# Patient Record
Sex: FEMALE | Race: WHITE | NOT HISPANIC OR LATINO | Employment: FULL TIME | URBAN - NONMETROPOLITAN AREA
[De-identification: names, ages, dates, MRNs, and addresses within clinical notes are randomized per-mention and may not be internally consistent; named-entity substitution may affect disease eponyms.]

---

## 2018-06-28 ENCOUNTER — HISTORICAL (OUTPATIENT)
Dept: ADMINISTRATIVE | Facility: HOSPITAL | Age: 23
End: 2018-06-28

## 2018-08-14 ENCOUNTER — HISTORICAL (OUTPATIENT)
Dept: ADMINISTRATIVE | Facility: HOSPITAL | Age: 23
End: 2018-08-14

## 2018-10-30 ENCOUNTER — HISTORICAL (OUTPATIENT)
Dept: ADMINISTRATIVE | Facility: HOSPITAL | Age: 23
End: 2018-10-30

## 2020-01-03 LAB
INFLUENZA A ANTIGEN, POC: NEGATIVE
INFLUENZA B ANTIGEN, POC: NEGATIVE

## 2020-02-24 ENCOUNTER — HISTORICAL (OUTPATIENT)
Dept: ADMINISTRATIVE | Facility: HOSPITAL | Age: 25
End: 2020-02-24

## 2020-04-07 ENCOUNTER — HISTORICAL (OUTPATIENT)
Dept: ADMINISTRATIVE | Facility: HOSPITAL | Age: 25
End: 2020-04-07

## 2022-02-15 ENCOUNTER — HISTORICAL (OUTPATIENT)
Dept: ADMINISTRATIVE | Facility: HOSPITAL | Age: 27
End: 2022-02-15

## 2022-02-15 LAB — PAP RECOMMENDATION EXT: NORMAL

## 2022-03-23 LAB
AGE: 24
ALBUMIN SERPL-MCNC: 4.6 G/DL (ref 3.4–5)
ALBUMIN/GLOB SERPL: 2 {RATIO}
ALP SERPL-CCNC: 62 U/L (ref 50–144)
ALT SERPL-CCNC: 15 U/L (ref 1–45)
ANION GAP SERPL CALC-SCNC: 8 MMOL/L (ref 2–13)
AST SERPL-CCNC: 31 U/L (ref 14–36)
BASOPHILS # BLD AUTO: 0.08 10*3/UL (ref 0.01–0.08)
BASOPHILS NFR BLD AUTO: 0.8 % (ref 0.1–1.2)
BILIRUB SERPL-MCNC: 0.49 MG/DL (ref 0–1)
BUN SERPL-MCNC: 11 MG/DL (ref 7–20)
CALCIUM SERPL-MCNC: 9.7 MG/DL (ref 8.4–10.2)
CHLORIDE SERPL-SCNC: 107 MMOL/L (ref 94–110)
CO2 SERPL-SCNC: 24 MMOL/L (ref 21–32)
CREAT SERPL-MCNC: 0.6 MG/DL (ref 0.52–1.04)
CREAT/UREA NIT SERPL: 18.3 (ref 12–20)
EOSINOPHIL # BLD AUTO: 0.37 10*3/UL (ref 0.04–0.36)
EOSINOPHIL NFR BLD AUTO: 3.6 % (ref 0.7–7)
ERYTHROCYTE [DISTWIDTH] IN BLOOD BY AUTOMATED COUNT: 13.6 % (ref 11–14.5)
GLOBULIN SER-MCNC: 2.3 G/DL (ref 2–3.9)
GLUCOSE SERPL-MCNC: 84 MG/DL (ref 70–115)
HCT VFR BLD AUTO: 40.5 % (ref 36–48)
HGB BLD-MCNC: 13.1 G/DL (ref 11.8–16)
IMM GRANULOCYTES # BLD AUTO: 0.04 10*3/UL (ref 0–0.03)
IMM GRANULOCYTES NFR BLD AUTO: 0.4 % (ref 0–0.5)
LYMPHOCYTES # BLD AUTO: 2.12 10*3/UL (ref 1.16–3.74)
LYMPHOCYTES NFR BLD AUTO: 20.5 % (ref 20–55)
MANUAL DIFF? (OHS): NORMAL
MCH RBC QN AUTO: 29.2 PG (ref 27–34)
MCHC RBC AUTO-ENTMCNC: 32.3 G/DL (ref 31–37)
MCV RBC AUTO: 90.4 FL (ref 79–99)
MONOCYTES # BLD AUTO: 0.81 10*3/UL (ref 0.24–0.36)
MONOCYTES NFR BLD AUTO: 7.8 % (ref 4.7–12.5)
NEUTROPHILS # BLD AUTO: 6.9 10*3/UL (ref 1.56–6.13)
NEUTROPHILS NFR BLD AUTO: 66.9 % (ref 37–73)
PLATELET # BLD AUTO: 349 10*3/UL (ref 140–371)
PMV BLD AUTO: 9.9 FL (ref 9.4–12.4)
POTASSIUM SERPL-SCNC: 4.3 MMOL/L (ref 3.5–5.1)
PROT SERPL-MCNC: 6.9 G/DL (ref 6.3–8.2)
RBC # BLD AUTO: 4.48 10*6/UL (ref 4–5.1)
SODIUM SERPL-SCNC: 139 MMOL/L (ref 135–145)
TSH SERPL-ACNC: 1.01 M[IU]/L (ref 0.36–3.74)
WBC # SPEC AUTO: 10.3 10*3/UL (ref 4–11.5)

## 2022-04-11 ENCOUNTER — HISTORICAL (OUTPATIENT)
Dept: ADMINISTRATIVE | Facility: HOSPITAL | Age: 27
End: 2022-04-11

## 2022-04-25 VITALS
BODY MASS INDEX: 19.99 KG/M2 | WEIGHT: 117.06 LBS | HEIGHT: 64 IN | DIASTOLIC BLOOD PRESSURE: 60 MMHG | OXYGEN SATURATION: 99 % | SYSTOLIC BLOOD PRESSURE: 120 MMHG

## 2022-04-26 ENCOUNTER — HISTORICAL (OUTPATIENT)
Dept: ADMINISTRATIVE | Facility: HOSPITAL | Age: 27
End: 2022-04-26

## 2022-05-15 ENCOUNTER — HISTORICAL (OUTPATIENT)
Dept: ADMINISTRATIVE | Facility: HOSPITAL | Age: 27
End: 2022-05-15

## 2022-07-08 ENCOUNTER — HOSPITAL ENCOUNTER (EMERGENCY)
Facility: HOSPITAL | Age: 27
Discharge: HOME | End: 2022-07-08
Payer: MEDICAID

## 2022-07-08 VITALS
SYSTOLIC BLOOD PRESSURE: 110 MMHG | TEMPERATURE: 99 F | RESPIRATION RATE: 16 BRPM | WEIGHT: 121.63 LBS | HEART RATE: 96 BPM | HEIGHT: 64 IN | OXYGEN SATURATION: 99 % | DIASTOLIC BLOOD PRESSURE: 72 MMHG | BODY MASS INDEX: 20.76 KG/M2

## 2022-07-08 DIAGNOSIS — R52 BODY ACHES: ICD-10-CM

## 2022-07-08 DIAGNOSIS — U07.1 COVID-19: ICD-10-CM

## 2022-07-08 DIAGNOSIS — R05.9 COUGH: Primary | ICD-10-CM

## 2022-07-08 LAB
FLUAV AG UPPER RESP QL IA.RAPID: NOT DETECTED
FLUBV AG UPPER RESP QL IA.RAPID: NOT DETECTED
RSV A 5' UTR RNA NPH QL NAA+PROBE: NOT DETECTED
SARS-COV-2 RNA RESP QL NAA+PROBE: DETECTED

## 2022-07-08 PROCEDURE — 99282 EMERGENCY DEPT VISIT SF MDM: CPT

## 2022-07-08 PROCEDURE — 87636 SARSCOV2 & INF A&B AMP PRB: CPT | Performed by: NURSE PRACTITIONER

## 2022-07-08 NOTE — ED PROVIDER NOTES
Encounter Date: 7/8/2022       History     Chief Complaint   Patient presents with    Generalized Body Aches     Pt c/o generalized body aches since yesterday, vomited x 1 today     26 year old female presents to ED for nasal congestion, headache, and body aches since last night. Denies being around anyone who is ill. Denies known contact with anyone who has COVID-19. Denies any cough, SOB, fever, or CP. Denies getting COVID-19 vaccine. Denies any N/V/D. Denies taking any medications daily. Denies any abdominal pain.        Review of patient's allergies indicates:   Allergen Reactions    Iodinated contrast media Shortness Of Breath    Shrimp      History reviewed. No pertinent past medical history.  History reviewed. No pertinent surgical history.  History reviewed. No pertinent family history.  Social History     Tobacco Use    Smoking status: Current Every Day Smoker     Types: Cigarettes    Smokeless tobacco: Never Used   Substance Use Topics    Alcohol use: Yes     Alcohol/week: 2.0 standard drinks     Types: 2 Standard drinks or equivalent per week    Drug use: Yes     Frequency: 1.0 times per week     Types: Marijuana     Review of Systems   Constitutional: Negative.  Negative for activity change, appetite change, chills, fatigue and fever.   HENT: Positive for congestion and rhinorrhea. Negative for dental problem, drooling, ear discharge, ear pain, hearing loss, mouth sores, sneezing and sore throat.    Eyes: Negative.    Respiratory: Negative.    Cardiovascular: Negative.    Gastrointestinal: Negative.    Endocrine: Negative.    Genitourinary: Negative.    Musculoskeletal: Positive for back pain and myalgias. Negative for gait problem and joint swelling.   Skin: Negative.    Allergic/Immunologic: Negative.    Neurological: Positive for headaches. Negative for dizziness, seizures and syncope.   Psychiatric/Behavioral: Negative.        Physical Exam     Initial Vitals [07/08/22 1259]   BP Pulse Resp  Temp SpO2   103/60 106 20 99 °F (37.2 °C) 99 %      MAP       --         Physical Exam    Constitutional: She appears well-developed and well-nourished. She is not diaphoretic. No distress.   Eyes: EOM are normal. Pupils are equal, round, and reactive to light.   Neck: Neck supple.   Normal range of motion.  Pulmonary/Chest: Breath sounds normal. No respiratory distress. She has no wheezes. She has no rhonchi. She has no rales. She exhibits no tenderness.   Musculoskeletal:         General: Normal range of motion.      Cervical back: Normal range of motion and neck supple.     Neurological: She is alert and oriented to person, place, and time.   Skin: Skin is warm and dry.   Psychiatric: She has a normal mood and affect. Her behavior is normal.         ED Course   Procedures  Labs Reviewed   COVID/RSV/FLU A&B PCR - Abnormal; Notable for the following components:       Result Value    SARS-CoV-2 PCR Detected (*)     All other components within normal limits          Imaging Results    None          Medications - No data to display              ED Course as of 07/08/22 1432   Fri Jul 08, 2022   1430 Discussed with patient she has COVID-19. Patient denies any past medical history and is other wise a healthy 26 year old female. Discussed with patient to take tylenol or ibuprofen as needed for pain/fever every 6 hours, follow instructions on OTC bottle. Discussed with patient 5 day quarantine as long as she is fever free for 48 hours and to wear a mask for an additional 5 days after quarantine. Discussed with patient to return to ED if symptoms persist or worsen or if she develops SOB or CP. Follow up with PCP in 1-2 days  [KM]      ED Course User Index  [KM] ROLA Griffith             Clinical Impression:   Final diagnoses:  [R05.9] Cough (Primary)  [R52] Body aches  [U07.1] COVID-19          ED Disposition Condition    Discharge Stable        ED Prescriptions     None        Follow-up Information    None           Jania Hurst, [facility]  07/08/22 4557

## 2022-07-08 NOTE — DISCHARGE INSTRUCTIONS
Follow up with primary care provider in as needed  Return to ED if symptoms persist or worsen or if you develop shortness of breath or chest pain

## 2022-09-21 ENCOUNTER — HISTORICAL (OUTPATIENT)
Dept: ADMINISTRATIVE | Facility: HOSPITAL | Age: 27
End: 2022-09-21
Payer: MEDICAID

## 2022-10-11 LAB
CHLAMYDIA: NEGATIVE
GONOCOCCUS BY NAA: NEGATIVE
TRICHOMONAS: NEGATIVE

## 2022-11-11 LAB
ABO + RH BLD: NORMAL
HBV SURFACE AG SERPL QL IA: NEGATIVE
HCT VFR BLD AUTO: 38.6 % (ref 36–46)
HCV AB SERPL QL IA: NEGATIVE
HGB BLD-MCNC: 12.7 G/DL (ref 12–16)
HGB ELECTROPHORESIS: NORMAL
HIV 1+2 AB+HIV1 P24 AG SERPL QL IA: NONREACTIVE
INDIRECT COOMBS: NEGATIVE
MCV RBC AUTO: 86.9 FL (ref 82–108)
PLATELET # BLD AUTO: 339 K/UL (ref 150–399)
RUBELLA IMMUNE STATUS: NORMAL
T PALLIDUM AB SER QL: NONREACTIVE
URINE CULTURE, ROUTINE: POSITIVE
VARICELLA ANTIBODY, IGG, CSF: NORMAL

## 2023-01-03 ENCOUNTER — HISTORICAL (OUTPATIENT)
Dept: ADMINISTRATIVE | Facility: HOSPITAL | Age: 28
End: 2023-01-03
Payer: MEDICAID

## 2023-01-23 ENCOUNTER — DOCUMENTATION ONLY (OUTPATIENT)
Dept: ADMINISTRATIVE | Facility: HOSPITAL | Age: 28
End: 2023-01-23
Payer: MEDICAID

## 2023-01-23 ENCOUNTER — HOSPITAL ENCOUNTER (EMERGENCY)
Facility: HOSPITAL | Age: 28
Discharge: HOME | End: 2023-01-23
Attending: FAMILY MEDICINE
Payer: MEDICAID

## 2023-01-23 VITALS
OXYGEN SATURATION: 96 % | BODY MASS INDEX: 22.99 KG/M2 | SYSTOLIC BLOOD PRESSURE: 113 MMHG | RESPIRATION RATE: 18 BRPM | HEART RATE: 87 BPM | DIASTOLIC BLOOD PRESSURE: 57 MMHG | HEIGHT: 64 IN | WEIGHT: 134.63 LBS | TEMPERATURE: 98 F

## 2023-01-23 DIAGNOSIS — J18.9 PNEUMONIA OF RIGHT MIDDLE LOBE DUE TO INFECTIOUS ORGANISM: Primary | ICD-10-CM

## 2023-01-23 DIAGNOSIS — R05.9 COUGH: ICD-10-CM

## 2023-01-23 LAB
ALBUMIN SERPL-MCNC: 3.2 G/DL (ref 3.4–5)
ALBUMIN/GLOB SERPL: 1.2 RATIO
ALP SERPL-CCNC: 87 UNIT/L (ref 50–144)
ALT SERPL-CCNC: 16 UNIT/L (ref 1–45)
AST SERPL-CCNC: 28 UNIT/L (ref 14–36)
BASOPHILS # BLD AUTO: 0.03 X10(3)/MCL (ref 0.01–0.08)
BASOPHILS NFR BLD AUTO: 0.4 % (ref 0.1–1.2)
BILIRUBIN DIRECT+TOT PNL SERPL-MCNC: 0.2 MG/DL (ref 0–1)
BUN SERPL-MCNC: 5 MG/DL (ref 7–20)
CALCIUM SERPL-MCNC: 8.4 MG/DL (ref 8.4–10.2)
CHLORIDE SERPL-SCNC: 109 MMOL/L (ref 98–110)
CO2 SERPL-SCNC: 24 MMOL/L (ref 21–32)
CREAT SERPL-MCNC: 0.48 MG/DL (ref 0.66–1.25)
EOSINOPHIL # BLD AUTO: 0.13 X10(3)/MCL (ref 0.04–0.36)
EOSINOPHIL NFR BLD AUTO: 1.7 % (ref 0.7–7)
ERYTHROCYTE [DISTWIDTH] IN BLOOD BY AUTOMATED COUNT: 14.2 % (ref 11–14.5)
GFR SERPLBLD CREATININE-BSD FMLA CKD-EPI: >90 MLS/MIN/1.73/M2
GLOBULIN SER-MCNC: 2.6 GM/DL (ref 2–3.9)
GLUCOSE SERPL-MCNC: 86 MG/DL (ref 70–115)
HCT VFR BLD AUTO: 32.8 % (ref 36–48)
HGB BLD-MCNC: 11 GM/DL (ref 11.8–16)
IMM GRANULOCYTES # BLD AUTO: 0.08 X10(3)/MCL (ref 0–0.03)
IMM GRANULOCYTES NFR BLD AUTO: 1 % (ref 0–0.5)
INFLUENZA A (OHS): NEGATIVE
INFLUENZA B (OHS): NEGATIVE
LYMPHOCYTES # BLD AUTO: 1.21 X10(3)/MCL (ref 1.16–3.74)
LYMPHOCYTES NFR BLD AUTO: 15.7 % (ref 20–55)
MCH RBC QN AUTO: 29.8 PG (ref 27–34)
MCV RBC AUTO: 88.9 FL (ref 79–99)
MEAN CELL HEMOGLOBIN CONCENTRATION (OHS) G/DL: 33.5 G/DL (ref 31–37)
MONOCYTES # BLD AUTO: 0.7 X10(3)/MCL (ref 0.24–0.36)
MONOCYTES NFR BLD AUTO: 9.1 % (ref 4.7–12.5)
NEUTROPHILS # BLD AUTO: 5.58 X10(3)/MCL (ref 1.56–6.13)
NEUTROPHILS NFR BLD AUTO: 72.1 % (ref 37–73)
NRBC BLD AUTO-RTO: 0 % (ref 0–1)
PLATELET # BLD AUTO: 266 X10(3)/MCL (ref 140–371)
PMV BLD AUTO: 9.5 FL (ref 9.4–12.4)
POTASSIUM SERPL-SCNC: 3.5 MMOL/L (ref 3.5–5.1)
PROT SERPL-MCNC: 5.8 GM/DL (ref 6.3–8.2)
RBC # BLD AUTO: 3.69 X10(6)/MCL (ref 4–5.1)
SARS-COV-2 RDRP RESP QL NAA+PROBE: NEGATIVE
SODIUM SERPL-SCNC: 135 MMOL/L (ref 135–145)
WBC # SPEC AUTO: 7.7 X10(3)/MCL (ref 4–11.5)

## 2023-01-23 PROCEDURE — 99284 EMERGENCY DEPT VISIT MOD MDM: CPT | Mod: 25

## 2023-01-23 PROCEDURE — 25000003 PHARM REV CODE 250: Performed by: FAMILY MEDICINE

## 2023-01-23 PROCEDURE — 96374 THER/PROPH/DIAG INJ IV PUSH: CPT

## 2023-01-23 PROCEDURE — 36415 COLL VENOUS BLD VENIPUNCTURE: CPT | Performed by: FAMILY MEDICINE

## 2023-01-23 PROCEDURE — 63600175 PHARM REV CODE 636 W HCPCS: Performed by: FAMILY MEDICINE

## 2023-01-23 PROCEDURE — 87635 SARS-COV-2 COVID-19 AMP PRB: CPT | Performed by: FAMILY MEDICINE

## 2023-01-23 PROCEDURE — 80053 COMPREHEN METABOLIC PANEL: CPT | Performed by: FAMILY MEDICINE

## 2023-01-23 PROCEDURE — 96361 HYDRATE IV INFUSION ADD-ON: CPT

## 2023-01-23 PROCEDURE — 87400 INFLUENZA A/B EACH AG IA: CPT | Performed by: FAMILY MEDICINE

## 2023-01-23 PROCEDURE — 96372 THER/PROPH/DIAG INJ SC/IM: CPT | Mod: 59 | Performed by: FAMILY MEDICINE

## 2023-01-23 PROCEDURE — 85025 COMPLETE CBC W/AUTO DIFF WBC: CPT | Performed by: FAMILY MEDICINE

## 2023-01-23 RX ORDER — SODIUM CHLORIDE 9 MG/ML
1000 INJECTION, SOLUTION INTRAVENOUS ONCE
Status: COMPLETED | OUTPATIENT
Start: 2023-01-23 | End: 2023-01-23

## 2023-01-23 RX ORDER — METHYLPREDNISOLONE SOD SUCC 125 MG
125 VIAL (EA) INJECTION
Status: COMPLETED | OUTPATIENT
Start: 2023-01-23 | End: 2023-01-23

## 2023-01-23 RX ORDER — CEFTRIAXONE 1 G/1
1 INJECTION, POWDER, FOR SOLUTION INTRAMUSCULAR; INTRAVENOUS
Status: COMPLETED | OUTPATIENT
Start: 2023-01-23 | End: 2023-01-23

## 2023-01-23 RX ORDER — AZITHROMYCIN 250 MG/1
TABLET, FILM COATED ORAL
Qty: 6 TABLET | Refills: 0 | Status: ON HOLD | OUTPATIENT
Start: 2023-01-23 | End: 2023-05-15 | Stop reason: HOSPADM

## 2023-01-23 RX ADMIN — SODIUM CHLORIDE 1000 ML: 9 INJECTION, SOLUTION INTRAVENOUS at 10:01

## 2023-01-23 RX ADMIN — CEFTRIAXONE SODIUM 1 G: 1 INJECTION, POWDER, FOR SOLUTION INTRAMUSCULAR; INTRAVENOUS at 01:01

## 2023-01-23 RX ADMIN — METHYLPREDNISOLONE SODIUM SUCCINATE 125 MG: 125 INJECTION, POWDER, FOR SOLUTION INTRAMUSCULAR; INTRAVENOUS at 10:01

## 2023-01-23 NOTE — ED PROVIDER NOTES
Encounter Date: 1/23/2023       History     Chief Complaint   Patient presents with    Cough    Shortness of Breath    Weakness     Patient reports cough since yesterday with SOB when laying flat and walking. Reports she has bodyaches and feels weak. Patient's coworker tested positive for COVID. Patient is 5 months pregnant.     Pt Is a 27-year-old female presents to emergency room with complaints of cough nose chills and body aches since yesterday    The history is provided by the patient.   Cough  This is a new problem. The current episode started yesterday. The problem occurs constantly. The problem has been unchanged. The cough is Non-productive. Associated symptoms include chills, headaches, myalgias and shortness of breath. She has tried nothing for the symptoms.   Shortness of Breath  This is a new problem. The current episode started yesterday. Associated symptoms include headaches and cough. She has had No prior hospitalizations.   Review of patient's allergies indicates:   Allergen Reactions    Iodinated contrast media Shortness Of Breath    Iodine     Shrimp      No past medical history on file.  Past Surgical History:   Procedure Laterality Date    CHOLECYSTECTOMY       No family history on file.  Social History     Tobacco Use    Smoking status: Every Day     Types: Cigarettes    Smokeless tobacco: Never   Substance Use Topics    Alcohol use: Yes     Alcohol/week: 2.0 standard drinks     Types: 2 Standard drinks or equivalent per week    Drug use: Yes     Frequency: 1.0 times per week     Types: Marijuana     Review of Systems   Constitutional:  Positive for chills.   Respiratory:  Positive for cough and shortness of breath.    Musculoskeletal:  Positive for myalgias.   Neurological:  Positive for headaches.   All other systems reviewed and are negative.    Physical Exam     Initial Vitals [01/23/23 0915]   BP Pulse Resp Temp SpO2   119/68 91 18 97.6 °F (36.4 °C) 100 %      MAP       --         Physical  Exam    Vitals reviewed.  Constitutional: She appears well-developed and well-nourished.   HENT:   Head: Normocephalic and atraumatic.   Eyes: EOM are normal. Pupils are equal, round, and reactive to light.   Neck: Neck supple.   Normal range of motion.  Cardiovascular:  Normal rate, regular rhythm, normal heart sounds and intact distal pulses.           Pulmonary/Chest: Breath sounds normal.   Abdominal: Abdomen is soft. Bowel sounds are normal.   Musculoskeletal:         General: Normal range of motion.      Cervical back: Normal range of motion and neck supple.     Neurological: She is alert and oriented to person, place, and time. GCS score is 15. GCS eye subscore is 4. GCS verbal subscore is 5. GCS motor subscore is 6.   Skin: Skin is warm and dry.   Psychiatric: She has a normal mood and affect. Thought content normal.       ED Course   Procedures  Labs Reviewed   RAPID INFLUENZA A/B   SARS-COV-2 RNA AMPLIFICATION, QUAL   CBC W/ AUTO DIFFERENTIAL    Narrative:     The following orders were created for panel order CBC auto differential.  Procedure                               Abnormality         Status                     ---------                               -----------         ------                     CBC with Differential[[id number]]                                                         Please view results for these tests on the individual orders.   COMPREHENSIVE METABOLIC PANEL   CBC WITH DIFFERENTIAL          Imaging Results    None       X-Rays:   Independently Interpreted Readings:   Chest X-Ray: Rml infiltrate   Medications   0.9%  NaCl infusion (has no administration in time range)   methylPREDNISolone sodium succinate injection 125 mg (has no administration in time range)                              Clinical Impression:   Final diagnoses:  [R05.9] Cough               Akbar Renteria MD  01/23/23 9847

## 2023-01-24 PROBLEM — F32.A DEPRESSIVE DISORDER: Status: ACTIVE | Noted: 2023-01-24

## 2023-01-24 PROBLEM — J45.909 ASTHMA: Status: ACTIVE | Noted: 2023-01-24

## 2023-01-24 PROBLEM — Z86.19 HISTORY OF HELICOBACTER PYLORI INFECTION: Status: ACTIVE | Noted: 2023-01-24

## 2023-01-24 PROBLEM — K21.9 ACID REFLUX: Status: ACTIVE | Noted: 2023-01-24

## 2023-01-24 PROBLEM — F41.9 ANXIETY: Status: ACTIVE | Noted: 2023-01-24

## 2023-01-24 PROBLEM — Z34.90 PREGNANCY: Status: ACTIVE | Noted: 2022-08-06

## 2023-01-24 PROBLEM — F12.90 MARIJUANA USER: Status: ACTIVE | Noted: 2023-01-24

## 2023-01-24 PROBLEM — O99.322 MATERNAL DRUG USE COMPLICATING PREGNANCY IN SECOND TRIMESTER, ANTEPARTUM: Status: ACTIVE | Noted: 2023-01-24

## 2023-01-24 PROBLEM — E55.9 VITAMIN D DEFICIENCY: Status: ACTIVE | Noted: 2023-01-24

## 2023-01-26 ENCOUNTER — OFFICE VISIT (OUTPATIENT)
Dept: FAMILY MEDICINE | Facility: CLINIC | Age: 28
End: 2023-01-26
Payer: MEDICAID

## 2023-01-26 VITALS
DIASTOLIC BLOOD PRESSURE: 82 MMHG | HEIGHT: 64 IN | OXYGEN SATURATION: 98 % | WEIGHT: 139.31 LBS | TEMPERATURE: 97 F | BODY MASS INDEX: 23.78 KG/M2 | SYSTOLIC BLOOD PRESSURE: 110 MMHG | HEART RATE: 92 BPM

## 2023-01-26 DIAGNOSIS — R05.1 ACUTE COUGH: ICD-10-CM

## 2023-01-26 DIAGNOSIS — Z00.00 ADULT GENERAL MEDICAL EXAMINATION: ICD-10-CM

## 2023-01-26 DIAGNOSIS — J18.9 PNEUMONIA AFFECTING PREGNANCY IN SECOND TRIMESTER: Primary | ICD-10-CM

## 2023-01-26 DIAGNOSIS — E55.9 VITAMIN D DEFICIENCY: ICD-10-CM

## 2023-01-26 DIAGNOSIS — O99.512 PNEUMONIA AFFECTING PREGNANCY IN SECOND TRIMESTER: Primary | ICD-10-CM

## 2023-01-26 DIAGNOSIS — J45.901 ASTHMA WITH ACUTE EXACERBATION, UNSPECIFIED ASTHMA SEVERITY, UNSPECIFIED WHETHER PERSISTENT: ICD-10-CM

## 2023-01-26 PROCEDURE — 1160F PR REVIEW ALL MEDS BY PRESCRIBER/CLIN PHARMACIST DOCUMENTED: ICD-10-PCS | Mod: CPTII,,, | Performed by: NURSE PRACTITIONER

## 2023-01-26 PROCEDURE — 1159F PR MEDICATION LIST DOCUMENTED IN MEDICAL RECORD: ICD-10-PCS | Mod: CPTII,,, | Performed by: NURSE PRACTITIONER

## 2023-01-26 PROCEDURE — 1159F MED LIST DOCD IN RCRD: CPT | Mod: CPTII,,, | Performed by: NURSE PRACTITIONER

## 2023-01-26 PROCEDURE — 3074F PR MOST RECENT SYSTOLIC BLOOD PRESSURE < 130 MM HG: ICD-10-PCS | Mod: CPTII,,, | Performed by: NURSE PRACTITIONER

## 2023-01-26 PROCEDURE — 99213 PR OFFICE/OUTPT VISIT, EST, LEVL III, 20-29 MIN: ICD-10-PCS | Mod: ,,, | Performed by: NURSE PRACTITIONER

## 2023-01-26 PROCEDURE — 1160F RVW MEDS BY RX/DR IN RCRD: CPT | Mod: CPTII,,, | Performed by: NURSE PRACTITIONER

## 2023-01-26 PROCEDURE — 3008F BODY MASS INDEX DOCD: CPT | Mod: CPTII,,, | Performed by: NURSE PRACTITIONER

## 2023-01-26 PROCEDURE — 3079F DIAST BP 80-89 MM HG: CPT | Mod: CPTII,,, | Performed by: NURSE PRACTITIONER

## 2023-01-26 PROCEDURE — 99213 OFFICE O/P EST LOW 20 MIN: CPT | Mod: ,,, | Performed by: NURSE PRACTITIONER

## 2023-01-26 PROCEDURE — 3074F SYST BP LT 130 MM HG: CPT | Mod: CPTII,,, | Performed by: NURSE PRACTITIONER

## 2023-01-26 PROCEDURE — 3008F PR BODY MASS INDEX (BMI) DOCUMENTED: ICD-10-PCS | Mod: CPTII,,, | Performed by: NURSE PRACTITIONER

## 2023-01-26 PROCEDURE — 3079F PR MOST RECENT DIASTOLIC BLOOD PRESSURE 80-89 MM HG: ICD-10-PCS | Mod: CPTII,,, | Performed by: NURSE PRACTITIONER

## 2023-01-26 RX ORDER — ALBUTEROL SULFATE 0.83 MG/ML
2.5 SOLUTION RESPIRATORY (INHALATION) EVERY 6 HOURS PRN
Qty: 90 ML | Refills: 0 | Status: SHIPPED | OUTPATIENT
Start: 2023-01-26 | End: 2023-06-08

## 2023-01-26 RX ORDER — PREDNISONE 20 MG/1
20 TABLET ORAL DAILY
Qty: 5 TABLET | Refills: 0 | Status: ON HOLD | OUTPATIENT
Start: 2023-01-26 | End: 2023-05-15 | Stop reason: HOSPADM

## 2023-01-26 RX ORDER — ALBUTEROL SULFATE 90 UG/1
2 AEROSOL, METERED RESPIRATORY (INHALATION) EVERY 6 HOURS PRN
Qty: 18 G | Refills: 11 | Status: SHIPPED | OUTPATIENT
Start: 2023-01-26 | End: 2023-06-08

## 2023-01-26 NOTE — PROGRESS NOTES
Patient ID: Jamia Smith is a 27 y.o. female.    Chief Complaint: Cough and Pneumonia (ER follow. Still not feeling better )                     Problem List:  Patient Active Problem List   Diagnosis    Acid reflux    History of Helicobacter pylori infection    Pregnancy    Anxiety    Asthma    Depressive disorder    Vitamin D deficiency    Maternal drug use complicating pregnancy in second trimester, antepartum    Marijuana user        Current Medications:  Current Outpatient Medications   Medication Instructions    albuterol (PROVENTIL) 2.5 mg, Nebulization, Every 6 hours PRN, Rescue    albuterol (VENTOLIN HFA) 90 mcg/actuation inhaler 2 puffs, Inhalation, Every 6 hours PRN, Rescue    azithromycin (Z-FLORESITA) 250 MG tablet Take 2 tablets by mouth on day 1; Take 1 tablet by mouth on days 2-5<BR>    nebulizer accessories Kit Misc.(Non-Drug; Combo Route)    predniSONE (DELTASONE) 20 mg, Oral, Daily       History of Present Illness:  The patient is 27 y.o. White female for evaluation and management with a chief complaint of Cough and Pneumonia (ER follow. Still not feeling better )   Patient went to ER for worsening cough over 3 days, diagnosed with pnm. Treated with IV fluids, rocephin and steroid. Currently taking azithromycin. Patient is out of her rescue inhaler.     Cough  This is a new problem. The problem occurs every few minutes. The cough is Non-productive. Associated symptoms include nasal congestion, postnasal drip, rhinorrhea, shortness of breath and wheezing. Pertinent negatives include no chest pain, chills, ear congestion, ear pain, fever, headaches, heartburn, myalgias, rash, sore throat, sweats or weight loss. She has tried a beta-agonist inhaler for the symptoms. The treatment provided mild relief. Her past medical history is significant for asthma and pneumonia.   Pneumonia  She complains of cough, shortness of breath and wheezing. Associated symptoms include appetite change, nasal congestion,  "postnasal drip and rhinorrhea. Pertinent negatives include no chest pain, ear congestion, ear pain, fever, headaches, heartburn, myalgias, sore throat, sweats or weight loss. Her past medical history is significant for asthma and pneumonia.      Review of Systems   Constitutional:  Positive for appetite change and fatigue. Negative for chills, fever and weight loss.   HENT:  Positive for postnasal drip and rhinorrhea. Negative for ear pain and sore throat.    Respiratory:  Positive for cough, shortness of breath and wheezing.    Cardiovascular:  Negative for chest pain.   Gastrointestinal:  Negative for abdominal pain, heartburn and nausea.   Musculoskeletal:  Negative for myalgias.   Integumentary:  Negative for rash.   Neurological:  Negative for dizziness and headaches.      Visit Vitals  /82 (BP Location: Right arm)   Pulse 92   Temp 96.8 °F (36 °C) (Temporal)   Ht 5' 4.02" (1.626 m)   Wt 63.2 kg (139 lb 5.3 oz)   LMP  (LMP Unknown)   SpO2 98%   BMI 23.90 kg/m²       Physical Exam  Vitals reviewed.   Constitutional:       General: She is not in acute distress.     Appearance: Normal appearance.   HENT:      Head: Normocephalic and atraumatic.      Right Ear: Tympanic membrane, ear canal and external ear normal.      Left Ear: Tympanic membrane, ear canal and external ear normal.      Nose: Congestion and rhinorrhea present.      Mouth/Throat:      Mouth: Mucous membranes are moist.      Pharynx: Oropharynx is clear. No oropharyngeal exudate or posterior oropharyngeal erythema.   Eyes:      Conjunctiva/sclera: Conjunctivae normal.   Cardiovascular:      Rate and Rhythm: Normal rate and regular rhythm.      Pulses: Normal pulses.      Heart sounds: No murmur heard.  Pulmonary:      Effort: Pulmonary effort is normal.      Breath sounds: Wheezing and rhonchi present.   Musculoskeletal:         General: No swelling, tenderness or deformity. Normal range of motion.      Cervical back: Normal range of motion and " neck supple.   Lymphadenopathy:      Cervical: No cervical adenopathy.   Skin:     General: Skin is warm and dry.      Capillary Refill: Capillary refill takes less than 2 seconds.      Coloration: Skin is not jaundiced.      Findings: No rash.   Neurological:      Mental Status: She is alert and oriented to person, place, and time.      Cranial Nerves: No cranial nerve deficit.   Psychiatric:         Mood and Affect: Mood normal.         Behavior: Behavior normal.        Assessment:    ICD-10-CM ICD-9-CM   1. Pneumonia affecting pregnancy in second trimester  O99.512 648.93    J18.9 486   2. Acute cough  R05.1 786.2   3. Asthma with acute exacerbation, unspecified asthma severity, unspecified whether persistent  J45.901 493.92        Plan:    1. Pneumonia affecting pregnancy in second trimester  Comments:  complete abx as prescribed. educated on proper use of incentive spirometer. refill rescue inhaler. ordered nebulizer. discussed ER precautions.     2. Acute cough    3. Asthma with acute exacerbation, unspecified asthma severity, unspecified whether persistent  Comments:  encouraged rest, hydration and nutrition. work excuse provided.     Other orders  -     albuterol (PROVENTIL) 2.5 mg /3 mL (0.083 %) nebulizer solution; Take 3 mLs (2.5 mg total) by nebulization every 6 (six) hours as needed for Wheezing or Shortness of Breath. Rescue  Dispense: 90 mL; Refill: 0  -     albuterol (VENTOLIN HFA) 90 mcg/actuation inhaler; Inhale 2 puffs into the lungs every 6 (six) hours as needed for Wheezing or Shortness of Breath. Rescue  Dispense: 18 g; Refill: 11  -     predniSONE (DELTASONE) 20 MG tablet; Take 1 tablet (20 mg total) by mouth once daily.  Dispense: 5 tablet; Refill: 0         Future Appointments   Date Time Provider Department Center   1/31/2023  9:30 AM MARY [facility]   8/23/2023  8:20 AM LAB, [facility] LABORATORY DRAW STATION [facility] DOROTHY Guajardo   8/30/2023  2:00 PM DEREJE Oliver  CM [facility]       Follow up in about 8 months (around 9/26/2023) for Wellness with fasting labs prior. or sooner as needed.  Future labs:    CBC, CMP, lipid, TSH, A1c    DEREJE SOARES

## 2023-01-27 VITALS — DIASTOLIC BLOOD PRESSURE: 68 MMHG | SYSTOLIC BLOOD PRESSURE: 120 MMHG | WEIGHT: 136 LBS | BODY MASS INDEX: 23.34 KG/M2

## 2023-01-31 ENCOUNTER — ROUTINE PRENATAL (OUTPATIENT)
Dept: OBSTETRICS AND GYNECOLOGY | Facility: CLINIC | Age: 28
End: 2023-01-31
Payer: MEDICAID

## 2023-01-31 VITALS
DIASTOLIC BLOOD PRESSURE: 68 MMHG | WEIGHT: 136.44 LBS | BODY MASS INDEX: 23.41 KG/M2 | SYSTOLIC BLOOD PRESSURE: 116 MMHG

## 2023-01-31 DIAGNOSIS — O99.332 MATERNAL TOBACCO USE IN SECOND TRIMESTER: ICD-10-CM

## 2023-01-31 DIAGNOSIS — O99.322 MATERNAL DRUG USE COMPLICATING PREGNANCY IN SECOND TRIMESTER, ANTEPARTUM: Primary | ICD-10-CM

## 2023-01-31 DIAGNOSIS — Z3A.24 24 WEEKS GESTATION OF PREGNANCY: ICD-10-CM

## 2023-01-31 LAB
BILIRUB SERPL-MCNC: NORMAL MG/DL
BLOOD URINE, POC: NORMAL
CLARITY, POC UA: NORMAL
COLOR, POC UA: YELLOW
GLUCOSE UR QL STRIP: NEGATIVE
KETONES UR QL STRIP: NORMAL
LEUKOCYTE ESTERASE URINE, POC: NEGATIVE
NITRITE, POC UA: NEGATIVE
PH, POC UA: NORMAL
PROTEIN, POC: NEGATIVE
SPECIFIC GRAVITY, POC UA: NORMAL
UROBILINOGEN, POC UA: NORMAL

## 2023-01-31 PROCEDURE — 99213 PR OFFICE/OUTPT VISIT, EST, LEVL III, 20-29 MIN: ICD-10-PCS | Mod: TH,,,

## 2023-01-31 PROCEDURE — 99213 OFFICE O/P EST LOW 20 MIN: CPT | Mod: TH,,,

## 2023-01-31 NOTE — PROGRESS NOTES
Subjective:       Patient ID: Jamia Smith is a 27 y.o. female.    Chief Complaint:  Routine Prenatal Visit      History of Present Illness  HPI  Jamia Smith 27 y.o. White female  presents to clinic at 24w4d for her routine tummy check. Denies any complaints today. Anatomy US (23) WNL    GYN & OB History  Patient's last menstrual period was 2022 (approximate).   Date of Last Pap: No result found    OB History    Para Term  AB Living   1             SAB IAB Ectopic Multiple Live Births                  # Outcome Date GA Lbr Meir/2nd Weight Sex Delivery Anes PTL Lv   1 Current                Review of Systems  Review of Systems   General/Constitutional: Fever denies .    Skin: Rash denies.   Respiratory: Cough denies . SOB denies . Wheezing denies .   Cardiovascular: Chest pain denies . Dizziness denies . Palpitations denies . Swelling in hands/feet denies    Gastrointestinal: Abdominal pain denies . Constipation denies . Diarrhea denies . Heartburn denies . Nausea denies . Vomiting denies    Genitourinary: Painful urination denies.   Gynecologic: Vaginal bleeding denies . Vaginal discharge Normal. Leaking amniotic fluid denies.  Contractions denies    Psychiatric: Depressed mood denies. Suicidal thoughts denies.       Objective:    Physical Exam     Gen: NAD   Abd: Gravid, NT   Ext: No CCE   FH: 24  FHT:  160  Assessment:        1. Maternal drug use complicating pregnancy in second trimester, antepartum    2. 24 weeks gestation of pregnancy    3. Maternal tobacco use in second trimester              Plan:      RTC in 4 weeks  CBC, RPR, and Irving ordered    Reviewed standard labor unit and kick count precautions.  Discussed pre-eclampsia precautions.  Discussed COVID-19 risks, social distancing, and isolation if respiratory symptoms develop.

## 2023-02-22 ENCOUNTER — HOSPITAL ENCOUNTER (OUTPATIENT)
Facility: HOSPITAL | Age: 28
Discharge: HOME | End: 2023-02-22
Attending: OBSTETRICS & GYNECOLOGY | Admitting: OBSTETRICS & GYNECOLOGY
Payer: MEDICAID

## 2023-02-22 ENCOUNTER — TELEPHONE (OUTPATIENT)
Dept: OBSTETRICS AND GYNECOLOGY | Facility: CLINIC | Age: 28
End: 2023-02-22
Payer: MEDICAID

## 2023-02-22 VITALS
TEMPERATURE: 98 F | SYSTOLIC BLOOD PRESSURE: 125 MMHG | HEART RATE: 98 BPM | RESPIRATION RATE: 18 BRPM | DIASTOLIC BLOOD PRESSURE: 66 MMHG

## 2023-02-22 DIAGNOSIS — O26.899 VAGINAL DISCHARGE DURING PREGNANCY: ICD-10-CM

## 2023-02-22 DIAGNOSIS — N89.8 VAGINAL DISCHARGE DURING PREGNANCY: ICD-10-CM

## 2023-02-22 PROBLEM — O23.42 URINARY TRACT INFECTION IN MOTHER DURING SECOND TRIMESTER OF PREGNANCY: Status: ACTIVE | Noted: 2023-02-22

## 2023-02-22 PROBLEM — O26.892 VAGINAL DISCHARGE DURING PREGNANCY IN SECOND TRIMESTER: Status: ACTIVE | Noted: 2023-02-22

## 2023-02-22 LAB
APPEARANCE UR: CLEAR
BACTERIA #/AREA URNS AUTO: ABNORMAL /HPF
BILIRUB UR QL STRIP.AUTO: NEGATIVE MG/DL
CAOX CRY URNS QL MICRO: ABNORMAL /HPF
COLOR UR AUTO: YELLOW
GLUCOSE UR QL STRIP.AUTO: NEGATIVE MG/DL
KETONES UR QL STRIP.AUTO: NEGATIVE MG/DL
LEUKOCYTE ESTERASE UR QL STRIP.AUTO: ABNORMAL UNIT/L
NITRITE UR QL STRIP.AUTO: NEGATIVE
PH UR STRIP.AUTO: 6 [PH]
PROT UR QL STRIP.AUTO: NEGATIVE MG/DL
RBC #/AREA URNS AUTO: ABNORMAL /HPF
RBC UR QL AUTO: ABNORMAL UNIT/L
SP GR UR STRIP.AUTO: >=1.03
SQUAMOUS #/AREA URNS AUTO: ABNORMAL /HPF
UROBILINOGEN UR STRIP-ACNC: 0.2 MG/DL
WBC #/AREA URNS AUTO: ABNORMAL /HPF

## 2023-02-22 PROCEDURE — 59025 FETAL NON-STRESS TEST: CPT

## 2023-02-22 PROCEDURE — 87491 CHLMYD TRACH DNA AMP PROBE: CPT | Performed by: OBSTETRICS & GYNECOLOGY

## 2023-02-22 PROCEDURE — 81001 URINALYSIS AUTO W/SCOPE: CPT | Performed by: OBSTETRICS & GYNECOLOGY

## 2023-02-22 PROCEDURE — 99211 OFF/OP EST MAY X REQ PHY/QHP: CPT | Mod: 25

## 2023-02-22 PROCEDURE — 87088 URINE BACTERIA CULTURE: CPT | Performed by: OBSTETRICS & GYNECOLOGY

## 2023-02-22 PROCEDURE — 59025 FETAL NON-STRESS TEST: CPT | Mod: 26,,, | Performed by: OBSTETRICS & GYNECOLOGY

## 2023-02-22 PROCEDURE — 59025 PR FETAL 2N-STRESS TEST: ICD-10-PCS | Mod: 26,,, | Performed by: OBSTETRICS & GYNECOLOGY

## 2023-02-22 RX ORDER — NITROFURANTOIN 25; 75 MG/1; MG/1
100 CAPSULE ORAL 2 TIMES DAILY
Qty: 14 CAPSULE | Refills: 0 | Status: SHIPPED | OUTPATIENT
Start: 2023-02-22 | End: 2023-02-27

## 2023-02-22 NOTE — NURSING
PT PRESENTED TO OB C/O GREEN DISCHARGE, FOUL ODOR AND PELVIC PRESSURE. SVE DONE. 0/0/-2. ORDERS FROM DR. DOSHI NOTED.     DR. NUGENT UPDATED ON PT STATUS- CL, UA, FETAL STRIP. NEW ORDERS NOTED.

## 2023-02-22 NOTE — HPI
27 y.o. female  at 27w5d presented c/o green vaginal discharge, odor, and pelvic pressure.      Fetal testing reactive.    UA + leuks, many bacteria, few calc oxylate crystals.    GC/CZ/TV cx obtained    Pt afebrile      TVUS Cervical length: 5.8cm

## 2023-02-22 NOTE — TELEPHONE ENCOUNTER
Pt c/o green vaginal discharge with odor, watery vaginal discharge x1 week with an increase since Sunday. States having to change underwear due to discharge. Reports positive pelvic pain.     Spoke with Dr Addison due to Dr Augustine being in surgery- pt to report to [facility] for evaluation. FFN- (hold- to send if CL <2.5), GC/CZ/TV, UA-culture if needed, CL.     Pt notified to report to [facility], Voiced understanding.     Placed call to [facility], spoke to Kristina and notified of Dr Addison's verbal orders listed above.

## 2023-02-22 NOTE — SUBJECTIVE & OBJECTIVE
Obstetric HPI:  Patient reports None contractions, active fetal movement, absent vaginal bleeding , absent loss of fluid      Objective:     Vital Signs (Most Recent):   BP: 125/66  HR: 98  Temp: 98.2 Vital Signs (24h Range):           FHT: 140Cat 1 (reassuring)  TOCO:  rare    No intake or output data in the 24 hours ending 02/22/23 1432    Cervical Exam:  Dilation:  0  Effacement:  0%  Station: -2  Per nurse     Significant Labs:  Recent Lab Results         02/22/23  1311        Appearance, UA Clear       Bacteria, UA Many       Bilirubin, UA Negative       Ca Oxalate Zunilda, UA Few       Color, UA Yellow       Glucose, UA Negative       Ketones, UA Negative       Leukocytes, UA Moderate       NITRITE UA Negative       Occult Blood UA Trace-Intact       pH, UA 6.0       Protein, UA Negative       RBC, UA 0-2       Specific Gravity,UA >=1.030       Squam Epithel, UA Rare       Urobilinogen, UA 0.2       WBC, UA 11-20               Physical Exam    Review of Systems   Constitutional:  Negative for chills and fatigue.   Eyes:  Negative for visual disturbance.   Respiratory:  Negative for shortness of breath.    Cardiovascular:  Negative for leg swelling.   Gastrointestinal:  Positive for abdominal pain. Negative for nausea and vomiting.   Genitourinary:  Positive for vaginal discharge and vaginal odor. Negative for genital sores and vaginal bleeding.   Musculoskeletal:  Negative for back pain.   Neurological:  Negative for seizures.

## 2023-02-22 NOTE — TELEPHONE ENCOUNTER
----- Message from Ashley Dyer sent at 2/22/2023 10:07 AM CST -----  Regarding: Call Back  Type:  Patient Returning Call    Who Called:Patient  Who Left Message for Patient:  Does the patient know what this is regarding?:  Would the patient rather a call back or a response via [employer]? Call Back  Best Call Back Number:[phone].  Additional Information: Pt states unusual discharge happened Sunday and never again since. Pt states she is curious about it.

## 2023-02-22 NOTE — PROGRESS NOTES
[facility]te[facility]    Patient Name: Jamia Smith  MRN: [id number]  Admission Date: 2023  Hospital Length of Stay: 0 days  Attending Physician: Davide Hood MD  Primary Care Provider: DEREJE SOARES    Subjective:     Principal Problem:Vaginal discharge during pregnancy in second trimester    HPI:  27 y.o. female  at 27w5d presented c/o green vaginal discharge, odor, and pelvic pressure.      Fetal testing reactive.    UA + leuks, many bacteria, few calc oxylate crystals.    GC/CZ/TV cx obtained    Pt afebrile      TVUS Cervical length: 5.8cm      Hospital Course:  No notes on file    Obstetric HPI:  Patient reports None contractions, active fetal movement, absent vaginal bleeding , absent loss of fluid      Objective:     Vital Signs (Most Recent):   BP: 125/66  HR: 98  Temp: 98.2 Vital Signs (24h Range):           FHT: 140Cat 1 (reassuring)  TOCO:  rare    No intake or output data in the 24 hours ending 23 1432    Cervical Exam:  Dilation:  0  Effacement:  0%  Station: -2  Per nurse     Significant Labs:  Recent Lab Results         23  1311        Appearance, UA Clear       Bacteria, UA Many       Bilirubin, UA Negative       Ca Oxalate Zunilda, UA Few       Color, UA Yellow       Glucose, UA Negative       Ketones, UA Negative       Leukocytes, UA Moderate       NITRITE UA Negative       Occult Blood UA Trace-Intact       pH, UA 6.0       Protein, UA Negative       RBC, UA 0-2       Specific Gravity,UA >=1.030       Squam Epithel, UA Rare       Urobilinogen, UA 0.2       WBC, UA 11-20               Physical Exam    Review of Systems   Constitutional:  Negative for chills and fatigue.   Eyes:  Negative for visual disturbance.   Respiratory:  Negative for shortness of breath.    Cardiovascular:  Negative for leg swelling.   Gastrointestinal:  Positive for abdominal pain. Negative for nausea and vomiting.   Genitourinary:  Positive for  vaginal discharge and vaginal odor. Negative for genital sores and vaginal bleeding.   Musculoskeletal:  Negative for back pain.   Neurological:  Negative for seizures.     Assessment/Plan:     27 y.o. female  at 27w5d for:  Active Hospital Problems    Diagnosis  POA    *Vaginal discharge during pregnancy in second trimester [O26.892, N89.8]  Yes    Urinary tract infection in mother during second trimester of pregnancy [O23.42]  Yes      Resolved Hospital Problems   No resolved problems to display.       Rx: Macrobid 100mg PO BID x 7 days  GC/CZ/TV sent  [facility]GE/Pre-e precautions  F/u in Clinic in 1 week    EZRA NUGENT MD  Obstetrics  [facility]

## 2023-02-25 LAB
BACTERIA UR CULT: NO GROWTH
C TRACH RRNA SPEC QL NAA+PROBE: NEGATIVE
N GONORRHOEA RRNA SPEC QL NAA+PROBE: NEGATIVE
SPECIMEN SOURCE: NORMAL
SPECIMEN SOURCE: NORMAL

## 2023-02-27 ENCOUNTER — ROUTINE PRENATAL (OUTPATIENT)
Dept: OBSTETRICS AND GYNECOLOGY | Facility: CLINIC | Age: 28
End: 2023-02-27
Payer: MEDICAID

## 2023-02-27 VITALS
SYSTOLIC BLOOD PRESSURE: 116 MMHG | WEIGHT: 143.63 LBS | BODY MASS INDEX: 24.52 KG/M2 | HEIGHT: 64 IN | DIASTOLIC BLOOD PRESSURE: 78 MMHG

## 2023-02-27 DIAGNOSIS — O23.42 URINARY TRACT INFECTION IN MOTHER DURING SECOND TRIMESTER OF PREGNANCY: ICD-10-CM

## 2023-02-27 DIAGNOSIS — Z3A.28 28 WEEKS GESTATION OF PREGNANCY: ICD-10-CM

## 2023-02-27 DIAGNOSIS — O99.333 MATERNAL TOBACCO USE IN THIRD TRIMESTER: ICD-10-CM

## 2023-02-27 DIAGNOSIS — O99.322 MATERNAL DRUG USE COMPLICATING PREGNANCY IN SECOND TRIMESTER, ANTEPARTUM: ICD-10-CM

## 2023-02-27 DIAGNOSIS — F32.A DEPRESSION COMPLICATING PREGNANCY IN THIRD TRIMESTER, ANTEPARTUM: Primary | ICD-10-CM

## 2023-02-27 DIAGNOSIS — O99.343 DEPRESSION COMPLICATING PREGNANCY IN THIRD TRIMESTER, ANTEPARTUM: Primary | ICD-10-CM

## 2023-02-27 LAB
BILIRUB UR QL STRIP: NORMAL
GLUCOSE UR QL STRIP: NEGATIVE
KETONES UR QL STRIP: NORMAL
LEUKOCYTE ESTERASE UR QL STRIP: NEGATIVE
PH, POC UA: NORMAL
POC BLOOD, URINE: NORMAL
POC NITRATES, URINE: NEGATIVE
PROT UR QL STRIP: NEGATIVE
SP GR UR STRIP: NORMAL (ref 1–1.03)
UROBILINOGEN UR STRIP-ACNC: NORMAL (ref 0.3–2.2)

## 2023-02-27 PROCEDURE — 99213 OFFICE O/P EST LOW 20 MIN: CPT | Mod: TH,,,

## 2023-02-27 PROCEDURE — 99213 PR OFFICE/OUTPT VISIT, EST, LEVL III, 20-29 MIN: ICD-10-PCS | Mod: TH,,,

## 2023-02-27 RX ORDER — SERTRALINE HYDROCHLORIDE 25 MG/1
25 TABLET, FILM COATED ORAL DAILY
Qty: 30 TABLET | Refills: 6 | Status: ON HOLD | OUTPATIENT
Start: 2023-02-27 | End: 2023-05-15 | Stop reason: HOSPADM

## 2023-02-27 NOTE — PROGRESS NOTES
"  Subjective:       Patient ID: Jamia Smith is a 27 y.o. female.    Chief Complaint:  Routine Prenatal Visit (28w3d. Considering Tdap vaccine./Pt reports "Went to [facility] on Thursday. Started antibiotic yesterday for UTI.")      History of Present Illness  HPI  Jamia Smith 27 y.o. White female  presents to clinic at 28w3d for her routine tummy check. Pt would like to think about Tdap vaccine. She is also c/o of depression over the last few weeks. She is currently have social issues at home and would like medication. Denies SI and HI. She was on medication prior to pregnancy but cannot remember what it was. Denies vaginal bleeding, LOF, and ctx. She is also currently on Macrobid for a UTI from evaluation at the hospital on 23.    GYN & OB History  Patient's last menstrual period was 2022 (approximate).   Date of Last Pap: 2/15/2022    OB History    Para Term  AB Living   1             SAB IAB Ectopic Multiple Live Births                  # Outcome Date GA Lbr Meir/2nd Weight Sex Delivery Anes PTL Lv   1 Current                Review of Systems  Review of Systems   General/Constitutional: Fever denies .    Skin: Rash denies.   Respiratory: Cough denies . SOB denies . Wheezing denies .   Cardiovascular: Chest pain denies . Dizziness denies . Palpitations denies . Swelling in hands/feet denies    Gastrointestinal: Abdominal pain denies . Constipation denies . Diarrhea denies . Heartburn denies . Nausea denies . Vomiting denies    Genitourinary: Painful urination denies.   Gynecologic: Vaginal bleeding denies . Vaginal discharge Normal. Leaking amniotic fluid denies.  Contractions denies    Psychiatric: Depressed mood admits. Suicidal thoughts denies.       Objective:    /78   Ht 5' 4" (1.626 m)   Wt 65.1 kg (143 lb 9.6 oz)   LMP 2022 (Approximate) Comment: 5 months pregnant, does not know weeks  BMI 24.65 kg/m²       Physical Exam     Gen: NAD   Abd: Gravid, NT "   Ext: No CCE   FH: 27   FHT: 160    Assessment:        1. Depression complicating pregnancy in third trimester, antepartum    2. 28 weeks gestation of pregnancy    3. Urinary tract infection in mother during second trimester of pregnancy    4. Maternal drug use complicating pregnancy in second trimester, antepartum    5. Maternal tobacco use in third trimester            Plan:   Rx: Zoloft  Cont. Macrobid  Draw CBC, RPR, and [place].  Will think about Tdap.    Reports + FM, denies VB, LOF or CTX.   Fundal Height appropriate for gestation. Patient's Blood Type is (O POS) .  1hr GTT has not been drawn. She will think about Tdap Vaccine and all questions regarding it have been answered.  Reviewed warning signs, normal fetal movement,  labor precautions and how/when to call.  RTC x 2 wks, call or present sooner prn.     Reviewed standard labor unit and kick count precautions.  Discussed pre-eclampsia precautions.  Discussed COVID-19 risks, social distancing, and isolation if respiratory symptoms develop.

## 2023-03-04 ENCOUNTER — HOSPITAL ENCOUNTER (EMERGENCY)
Facility: HOSPITAL | Age: 28
Discharge: HOME | End: 2023-03-04
Attending: FAMILY MEDICINE
Payer: MEDICAID

## 2023-03-04 VITALS
OXYGEN SATURATION: 96 % | BODY MASS INDEX: 22.96 KG/M2 | HEIGHT: 64 IN | SYSTOLIC BLOOD PRESSURE: 114 MMHG | DIASTOLIC BLOOD PRESSURE: 65 MMHG | TEMPERATURE: 97 F | WEIGHT: 134.5 LBS | HEART RATE: 71 BPM | RESPIRATION RATE: 18 BRPM

## 2023-03-04 DIAGNOSIS — O21.2 VOMITING OF PREGNANCY, AFTER 22 WEEKS: Primary | ICD-10-CM

## 2023-03-04 LAB
ALBUMIN SERPL-MCNC: 3.3 G/DL (ref 3.4–5)
ALBUMIN/GLOB SERPL: 1.3 RATIO
ALP SERPL-CCNC: 102 UNIT/L (ref 50–144)
ALT SERPL-CCNC: 27 UNIT/L (ref 1–45)
ANION GAP SERPL CALC-SCNC: 5 MEQ/L (ref 2–13)
APPEARANCE UR: CLEAR
AST SERPL-CCNC: 34 UNIT/L (ref 14–36)
BACTERIA #/AREA URNS AUTO: ABNORMAL /HPF
BASOPHILS # BLD AUTO: 0.05 X10(3)/MCL (ref 0.01–0.08)
BASOPHILS NFR BLD AUTO: 0.3 % (ref 0.1–1.2)
BILIRUB UR QL STRIP.AUTO: NEGATIVE MG/DL
BILIRUBIN DIRECT+TOT PNL SERPL-MCNC: 0.4 MG/DL (ref 0–1)
BUN SERPL-MCNC: 5 MG/DL (ref 7–20)
CALCIUM SERPL-MCNC: 8.3 MG/DL (ref 8.4–10.2)
CHLORIDE SERPL-SCNC: 111 MMOL/L (ref 98–110)
CO2 SERPL-SCNC: 22 MMOL/L (ref 21–32)
COLOR UR AUTO: YELLOW
CREAT SERPL-MCNC: 0.36 MG/DL (ref 0.66–1.25)
CREAT/UREA NIT SERPL: 14 (ref 12–20)
EOSINOPHIL # BLD AUTO: 0.14 X10(3)/MCL (ref 0.04–0.36)
EOSINOPHIL NFR BLD AUTO: 0.9 % (ref 0.7–7)
ERYTHROCYTE [DISTWIDTH] IN BLOOD BY AUTOMATED COUNT: 13.7 % (ref 11–14.5)
GFR SERPLBLD CREATININE-BSD FMLA CKD-EPI: >90 MLS/MIN/1.73/M2
GLOBULIN SER-MCNC: 2.6 GM/DL (ref 2–3.9)
GLUCOSE SERPL-MCNC: 95 MG/DL (ref 70–115)
GLUCOSE UR QL STRIP.AUTO: NEGATIVE MG/DL
HCT VFR BLD AUTO: 34.9 % (ref 36–48)
HGB BLD-MCNC: 11.6 G/DL (ref 11.8–16)
IMM GRANULOCYTES # BLD AUTO: 0.21 X10(3)/MCL (ref 0–0.03)
IMM GRANULOCYTES NFR BLD AUTO: 1.3 % (ref 0–0.5)
KETONES UR QL STRIP.AUTO: NEGATIVE MG/DL
LEUKOCYTE ESTERASE UR QL STRIP.AUTO: ABNORMAL UNIT/L
LYMPHOCYTES # BLD AUTO: 1.06 X10(3)/MCL (ref 1.16–3.74)
LYMPHOCYTES NFR BLD AUTO: 6.4 % (ref 20–55)
MCH RBC QN AUTO: 29.3 PG (ref 27–34)
MCV RBC AUTO: 88.1 FL (ref 79–99)
MEAN CELL HEMOGLOBIN CONCENTRATION (OHS) G/DL: 33.2 G/DL (ref 31–37)
MONOCYTES # BLD AUTO: 0.78 X10(3)/MCL (ref 0.24–0.36)
MONOCYTES NFR BLD AUTO: 4.7 % (ref 4.7–12.5)
NEUTROPHILS # BLD AUTO: 14.22 X10(3)/MCL (ref 1.56–6.13)
NEUTROPHILS NFR BLD AUTO: 86.4 % (ref 37–73)
NITRITE UR QL STRIP.AUTO: NEGATIVE
NRBC BLD AUTO-RTO: 0 % (ref 0–1)
PH UR STRIP.AUTO: 7.5 [PH]
PLATELET # BLD AUTO: 288 X10(3)/MCL (ref 140–371)
PMV BLD AUTO: 10.1 FL (ref 9.4–12.4)
POTASSIUM SERPL-SCNC: 3.6 MMOL/L (ref 3.5–5.1)
PROT SERPL-MCNC: 5.9 GM/DL (ref 6.3–8.2)
PROT UR QL STRIP.AUTO: ABNORMAL MG/DL
RBC # BLD AUTO: 3.96 X10(6)/MCL (ref 4–5.1)
RBC #/AREA URNS AUTO: ABNORMAL /HPF
RBC UR QL AUTO: NEGATIVE UNIT/L
SODIUM SERPL-SCNC: 138 MMOL/L (ref 135–145)
SP GR UR STRIP.AUTO: 1.02
SQUAMOUS #/AREA URNS AUTO: ABNORMAL /HPF
UROBILINOGEN UR STRIP-ACNC: 1 MG/DL
WBC # SPEC AUTO: 16.5 X10(3)/MCL (ref 4–11.5)
WBC #/AREA URNS AUTO: ABNORMAL /HPF

## 2023-03-04 PROCEDURE — 36415 COLL VENOUS BLD VENIPUNCTURE: CPT | Performed by: FAMILY MEDICINE

## 2023-03-04 PROCEDURE — 80053 COMPREHEN METABOLIC PANEL: CPT | Performed by: FAMILY MEDICINE

## 2023-03-04 PROCEDURE — 96374 THER/PROPH/DIAG INJ IV PUSH: CPT

## 2023-03-04 PROCEDURE — 85025 COMPLETE CBC W/AUTO DIFF WBC: CPT | Performed by: FAMILY MEDICINE

## 2023-03-04 PROCEDURE — 96361 HYDRATE IV INFUSION ADD-ON: CPT

## 2023-03-04 PROCEDURE — 63600175 PHARM REV CODE 636 W HCPCS: Performed by: FAMILY MEDICINE

## 2023-03-04 PROCEDURE — 81001 URINALYSIS AUTO W/SCOPE: CPT | Performed by: FAMILY MEDICINE

## 2023-03-04 PROCEDURE — 25000003 PHARM REV CODE 250: Performed by: FAMILY MEDICINE

## 2023-03-04 PROCEDURE — 99285 EMERGENCY DEPT VISIT HI MDM: CPT | Mod: 25

## 2023-03-04 RX ORDER — ONDANSETRON 2 MG/ML
4 INJECTION INTRAMUSCULAR; INTRAVENOUS
Status: COMPLETED | OUTPATIENT
Start: 2023-03-04 | End: 2023-03-04

## 2023-03-04 RX ORDER — ONDANSETRON 4 MG/1
8 TABLET, FILM COATED ORAL EVERY 6 HOURS PRN
Qty: 10 TABLET | Refills: 0 | Status: ON HOLD | OUTPATIENT
Start: 2023-03-04 | End: 2023-05-15 | Stop reason: HOSPADM

## 2023-03-04 RX ORDER — SODIUM CHLORIDE 9 MG/ML
1000 INJECTION, SOLUTION INTRAVENOUS
Status: COMPLETED | OUTPATIENT
Start: 2023-03-04 | End: 2023-03-04

## 2023-03-04 RX ADMIN — ONDANSETRON 4 MG: 2 INJECTION INTRAMUSCULAR; INTRAVENOUS at 07:03

## 2023-03-04 RX ADMIN — SODIUM CHLORIDE 1000 ML: 9 INJECTION, SOLUTION INTRAVENOUS at 07:03

## 2023-03-04 NOTE — Clinical Note
"Jamia"Yuliana Smith was seen and treated in our emergency department on 3/4/2023.  She may return to work on 03/06/2023.       If you have any questions or concerns, please don't hesitate to call.      Yung Pierre MD"

## 2023-03-04 NOTE — ED PROVIDER NOTES
Encounter Date: 3/4/2023       History     Chief Complaint   Patient presents with    Nausea    Vomiting     PRESENTS TO ED AMBULATORY WITH C/O N/V X1 WEEK WITH NO IMPROVEMENT. PRESCRIBED NAUSEA MEDS BUT LOST DURING MOVING PROCESS. ONSET @ 0200. PATIENT 7MONTHS PREGNANT DUE DATE 5/19/23     Patient presented to the emergency room with a proximally one-week history of nausea vomiting.  She is about 7 months pregnant.  She is done this throughout her pregnancy on and off.  She is out of nausea medicine.  Denies any abdominal cramping or vaginal discharge, and reports good FM    The history is provided by the patient.   Review of patient's allergies indicates:   Allergen Reactions    Iodinated contrast media Shortness Of Breath    Iodine Rash and Swelling    Shrimp      History reviewed. No pertinent past medical history.  Past Surgical History:   Procedure Laterality Date    CHOLECYSTECTOMY       Family History   Family history unknown: Yes     Social History     Tobacco Use    Smoking status: Every Day     Packs/day: 0.25     Types: Cigarettes    Smokeless tobacco: Never   Substance Use Topics    Alcohol use: Not Currently    Drug use: Not Currently     Types: Marijuana     Review of Systems   Constitutional:  Negative for fever.   HENT: Negative.  Negative for sore throat.    Respiratory:  Negative for shortness of breath.    Cardiovascular:  Negative for chest pain.   Gastrointestinal:  Positive for nausea and vomiting. Negative for abdominal pain and diarrhea.   Genitourinary:  Negative for dysuria.   Musculoskeletal:  Positive for myalgias. Negative for back pain.   Skin:  Negative for rash.   Neurological:  Negative for weakness.   Hematological:  Does not bruise/bleed easily.   All other systems reviewed and are negative.    Physical Exam     Initial Vitals [03/04/23 0647]   BP Pulse Resp Temp SpO2   120/78 92 18 96.8 °F (36 °C) 98 %      MAP       --         Physical Exam    Nursing note and vitals  reviewed.  Constitutional:   Appears uncomfortable, but is in no distress. A+Ox4.    HENT:   Head: Normocephalic and atraumatic.   Eyes: EOM are normal. Pupils are equal, round, and reactive to light.   Neck: Neck supple.   Normal range of motion.  Cardiovascular:  Normal rate, regular rhythm and normal heart sounds.           Pulmonary/Chest: Breath sounds normal.   Abdominal: Bowel sounds are normal. There is no abdominal tenderness.   Gravid, non tender.    Musculoskeletal:         General: No edema. Normal range of motion.      Cervical back: Normal range of motion and neck supple.     Neurological: She is alert and oriented to person, place, and time.   Skin: Skin is warm and dry. Capillary refill takes less than 2 seconds.   Psychiatric: She has a normal mood and affect.       ED Course   Procedures  Labs Reviewed   COMPREHENSIVE METABOLIC PANEL - Abnormal; Notable for the following components:       Result Value    Chloride 111 (*)     Blood Urea Nitrogen 5.0 (*)     Creatinine 0.36 (*)     Calcium Level Total 8.3 (*)     Protein Total 5.9 (*)     Albumin Level 3.3 (*)     All other components within normal limits   CBC WITH DIFFERENTIAL - Abnormal; Notable for the following components:    WBC 16.5 (*)     RBC 3.96 (*)     Hgb 11.6 (*)     Hct 34.9 (*)     Neut % 86.4 (*)     Lymph % 6.4 (*)     Lymph # 1.06 (*)     Neut # 14.22 (*)     Mono # 0.78 (*)     IG# 0.21 (*)     IG% 1.3 (*)     All other components within normal limits   URINALYSIS - Abnormal; Notable for the following components:    Protein, UA Trace (*)     Leukocyte Esterase, UA Small (*)     All other components within normal limits    Narrative:      URINE STABILITY IS 2 HOURS AT ROOM TEMP OR    SIX HOURS REFRIGERATED. PERFORMING TESTING ON    SPECIMENS GREATER THAN THIS AGE MAY AFFECT THE    FOLLOWING TESTS:    PH          SPECIFIC GRAVITY           BLOOD    CLARITY     BILIRUBIN               UROBILINOGEN   URINALYSIS, MICROSCOPIC -  Abnormal; Notable for the following components:    Bacteria, UA Trace (*)     Squamous Epithelial Cells, UA Few (*)     All other components within normal limits   CBC W/ AUTO DIFFERENTIAL    Narrative:     The following orders were created for panel order CBC auto differential.  Procedure                               Abnormality         Status                     ---------                               -----------         ------                     CBC with Differential[[id number]]        Abnormal            Final result                 Please view results for these tests on the individual orders.          Imaging Results    None          Medications   0.9%  NaCl infusion (0 mLs Intravenous Stopped 3/4/23 0808)   ondansetron injection 4 mg (4 mg Intravenous Given 3/4/23 0707)                              Clinical Impression:   Final diagnoses:  [O21.2] Vomiting of pregnancy, after 22 weeks (Primary)        ED Disposition Condition    Discharge Stable          ED Prescriptions       Medication Sig Dispense Start Date End Date Auth. Provider    ondansetron (ZOFRAN) 4 MG tablet Take 2 tablets (8 mg total) by mouth every 6 (six) hours as needed for Nausea. 10 tablet 3/4/2023 -- Yung Pierre MD          Follow-up Information       Follow up With Specialties Details Why Contact Info    Davide Hood MD Obstetrics and Gynecology Call  If symptoms worsen or persist. [address] [facility] [address]               Yung Pierre MD  03/04/23 0412

## 2023-03-07 ENCOUNTER — ROUTINE PRENATAL (OUTPATIENT)
Dept: OBSTETRICS AND GYNECOLOGY | Facility: CLINIC | Age: 28
End: 2023-03-07
Payer: MEDICAID

## 2023-03-07 VITALS
BODY MASS INDEX: 25.01 KG/M2 | SYSTOLIC BLOOD PRESSURE: 124 MMHG | WEIGHT: 145.75 LBS | DIASTOLIC BLOOD PRESSURE: 72 MMHG

## 2023-03-07 DIAGNOSIS — O99.343 DEPRESSION COMPLICATING PREGNANCY IN THIRD TRIMESTER, ANTEPARTUM: ICD-10-CM

## 2023-03-07 DIAGNOSIS — Z3A.29 29 WEEKS GESTATION OF PREGNANCY: ICD-10-CM

## 2023-03-07 DIAGNOSIS — O99.322 MATERNAL DRUG USE COMPLICATING PREGNANCY IN SECOND TRIMESTER, ANTEPARTUM: ICD-10-CM

## 2023-03-07 DIAGNOSIS — O23.42 URINARY TRACT INFECTION IN MOTHER DURING SECOND TRIMESTER OF PREGNANCY: ICD-10-CM

## 2023-03-07 DIAGNOSIS — O26.899 PELVIC PRESSURE IN PREGNANCY, ANTEPARTUM: Primary | ICD-10-CM

## 2023-03-07 DIAGNOSIS — O26.893 VAGINAL DISCHARGE DURING PREGNANCY IN THIRD TRIMESTER: ICD-10-CM

## 2023-03-07 DIAGNOSIS — F32.A DEPRESSION COMPLICATING PREGNANCY IN THIRD TRIMESTER, ANTEPARTUM: ICD-10-CM

## 2023-03-07 DIAGNOSIS — F12.90 MARIJUANA USER: ICD-10-CM

## 2023-03-07 DIAGNOSIS — N89.8 VAGINAL DISCHARGE DURING PREGNANCY IN THIRD TRIMESTER: ICD-10-CM

## 2023-03-07 DIAGNOSIS — R10.2 PELVIC PRESSURE IN PREGNANCY, ANTEPARTUM: Primary | ICD-10-CM

## 2023-03-07 DIAGNOSIS — O98.819 CANDIDIASIS OF VAGINA DURING PREGNANCY: ICD-10-CM

## 2023-03-07 DIAGNOSIS — B37.31 CANDIDIASIS OF VAGINA DURING PREGNANCY: ICD-10-CM

## 2023-03-07 LAB
BILIRUB UR QL STRIP: NORMAL
GARDNERELLA VAGINALIS: NEGATIVE
GLUCOSE UR QL STRIP: NEGATIVE
KETONES UR QL STRIP: NORMAL
LEUKOCYTE ESTERASE UR QL STRIP: NEGATIVE
OTHER MICROSC. OBSERVATIONS: NEGATIVE
PH, POC UA: NORMAL
POC BACTERIAL VAGINOSIS: NEGATIVE
POC BLOOD, URINE: NORMAL
POC CLUE CELLS: NEGATIVE
POC NITRATES, URINE: NEGATIVE
PROT UR QL STRIP: NEGATIVE
SP GR UR STRIP: NORMAL (ref 1–1.03)
TRICHOMONAS, POC: NEGATIVE
UROBILINOGEN UR STRIP-ACNC: NORMAL (ref 0.3–2.2)
YEAST WET PREP: POSITIVE

## 2023-03-07 PROCEDURE — 87210 SMEAR WET MOUNT SALINE/INK: CPT | Mod: QW,,, | Performed by: OBSTETRICS & GYNECOLOGY

## 2023-03-07 PROCEDURE — 76817 PR US, OB, TRANSVAG APPROACH: ICD-10-PCS | Mod: 26,,, | Performed by: OBSTETRICS & GYNECOLOGY

## 2023-03-07 PROCEDURE — 99214 OFFICE O/P EST MOD 30 MIN: CPT | Mod: 25,TH,, | Performed by: OBSTETRICS & GYNECOLOGY

## 2023-03-07 PROCEDURE — 76817 TRANSVAGINAL US OBSTETRIC: CPT | Mod: 26,,, | Performed by: OBSTETRICS & GYNECOLOGY

## 2023-03-07 PROCEDURE — 99214 PR OFFICE/OUTPT VISIT, EST, LEVL IV, 30-39 MIN: ICD-10-PCS | Mod: 25,TH,, | Performed by: OBSTETRICS & GYNECOLOGY

## 2023-03-07 PROCEDURE — 87210 POCT WET PREP: ICD-10-PCS | Mod: QW,,, | Performed by: OBSTETRICS & GYNECOLOGY

## 2023-03-07 NOTE — PROGRESS NOTES
HPI  27 y.o.  at 29w4d here for OB check. Seen in ER 3/4/23 for n/v- given zofran.    3/4/23 Hemoglobin: 11.6. Seen in [facility]  for vaginal discharge, pelvic pain. CL on 23, 5.8. given Macrobid for UTI. Reports continued vaginal discharge and pelvic pain.       ROS  Review of Systems   Constitutional:  Negative for chills and fever.   Gastrointestinal:  Negative for abdominal pain, constipation and diarrhea.   Genitourinary:  Positive for vaginal discharge and vaginal pain. Negative for flank pain, genital sores, pelvic pain, urgency, vaginal bleeding, postcoital bleeding and vaginal odor.       OBJECTIVE  /72   Wt 66.1 kg (145 lb 11.6 oz)   LMP 2022 (Approximate) Comment: 5 months pregnant, does not know weeks  BMI 25.01 kg/m²     General Appearance: alert, in no acute distress, normal, well nourished.  Psych:  Orientation: time, place, person.  Mood/Affect: Normal   Abdomen:  - Soft. Non-tender. No rebound tenderness or guardingNo masses. Liver normal. Spleen normal. No hernia palpable.  Pelvis:   - Vulva: erythema  - Perianal skin: Normal   - Urethra: Normal meatus. Q-tip: Not performed   - Vagina: thick, curd-like yellow/green vaginal discharge present: . Cystocele: Absent. Rectocele: Absent   - Cervix: Normal. Cervical motion tenderness Absent   - Uterus: Mobile. Non-tender.   - Adnexal: Normal     TVUS:  CL: 4.1  FHT: 160s      ASSESSMENT  1. Pelvic pressure in pregnancy, antepartum    2. 29 weeks gestation of pregnancy  - US OB/GYN Procedure (Viewpoint) - Extended List - Future; Future  - US OB/GYN Procedure (Viewpoint) - Extended List - Future  - POCT Urinalysis, Dipstick, Automated, W/O Scope    3. Candidiasis of vagina during pregnancy    4. Maternal drug use complicating pregnancy in second trimester, antepartum    5. Depression complicating pregnancy in third trimester, antepartum    6. Urinary tract infection in mother during second trimester of pregnancy    7. Marijuana  user    8. Vaginal discharge during pregnancy in third trimester  - POCT Wet Prep        PLAN  Cont Zoloft   Cont Zofran   Has not done [place], RPR, CBC- discussed compliance   Reviewed standard labor unit and kick count precautions.  Discussed pre-eclampsia precautions.  Discussed COVID-19 risks, social distancing, and isolation if respiratory symptoms develop.   Oneswab gc cz tv collected   Wet prep: yeast   Recommend monistat  If no relief in 5-7 days, will do diflucan    RTC 2 weeks

## 2023-03-17 NOTE — PROGRESS NOTES
HPI  27 y.o.  at 31w4d here for OB check.  Discontinued Zoloft due to daily N/V.  N/V has improved off the medication.  Depression is stable and tolerable without zoloft for now.  Denies severe depression or suicidal ideation.   Asthma well controlled.      ROS  Review of Systems   Constitutional:  Negative for chills and fever.   Gastrointestinal:  Negative for abdominal pain, constipation and diarrhea.   Genitourinary:  Negative for flank pain, genital sores, pelvic pain, urgency, vaginal bleeding, vaginal discharge, vaginal pain, postcoital bleeding and vaginal odor.       OBJECTIVE  BP (!) 110/56   Wt 66.7 kg (147 lb)   LMP 2022 (Approximate) Comment: 5 months pregnant, does not know weeks  BMI 25.23 kg/m²     Gen: No distress  Psych: A&O.  Normal affect. Good mood.  Abdomen: Gravid, non-tender  Extremities: No edema    FHT: 130  FH:31cm    ASSESSMENT  1. Depression complicating pregnancy in third trimester, antepartum    2. 31 weeks gestation of pregnancy  - POCT Urinalysis, Dipstick, Automated, W/O Scope    3. Maternal drug use complicating pregnancy in second trimester, antepartum    4. Anxiety during pregnancy    5. Asthma during pregnancy        PLAN  Reviewed standard labor unit and kick count precautions.  Discussed pre-eclampsia precautions.  Discussed COVID-19 risks, social distancing, and isolation if respiratory symptoms develop.   Depression stable.  Cont to monitor.  Precautions given  Cont Zofran PRN nausea.   Cont Asthma medications  RTC 2 weeks

## 2023-03-20 ENCOUNTER — LAB VISIT (OUTPATIENT)
Dept: LAB | Facility: HOSPITAL | Age: 28
End: 2023-03-20
Payer: MEDICAID

## 2023-03-20 DIAGNOSIS — Z3A.24 24 WEEKS GESTATION OF PREGNANCY: ICD-10-CM

## 2023-03-20 LAB
BASOPHILS # BLD AUTO: 0.04 X10(3)/MCL (ref 0.01–0.08)
BASOPHILS NFR BLD AUTO: 0.3 % (ref 0.1–1.2)
EOSINOPHIL # BLD AUTO: 0.25 X10(3)/MCL (ref 0.04–0.36)
EOSINOPHIL NFR BLD AUTO: 2 % (ref 0.7–7)
ERYTHROCYTE [DISTWIDTH] IN BLOOD BY AUTOMATED COUNT: 13.7 % (ref 11–14.5)
GLUCOSE 1H P 100 G GLC PO SERPL-MCNC: 81 MG/DL
HCT VFR BLD AUTO: 33.7 % (ref 36–48)
HGB BLD-MCNC: 11.1 G/DL (ref 11.8–16)
IMM GRANULOCYTES # BLD AUTO: 0.12 X10(3)/MCL (ref 0–0.03)
IMM GRANULOCYTES NFR BLD AUTO: 1 % (ref 0–0.5)
LYMPHOCYTES # BLD AUTO: 1.64 X10(3)/MCL (ref 1.16–3.74)
LYMPHOCYTES NFR BLD AUTO: 13.3 % (ref 20–55)
MCH RBC QN AUTO: 29.5 PG (ref 27–34)
MCV RBC AUTO: 89.6 FL (ref 79–99)
MEAN CELL HEMOGLOBIN CONCENTRATION (OHS) G/DL: 32.9 G/DL (ref 31–37)
MONOCYTES # BLD AUTO: 0.95 X10(3)/MCL (ref 0.24–0.36)
MONOCYTES NFR BLD AUTO: 7.7 % (ref 4.7–12.5)
NEUTROPHILS # BLD AUTO: 9.32 X10(3)/MCL (ref 1.56–6.13)
NEUTROPHILS NFR BLD AUTO: 75.7 % (ref 37–73)
NRBC BLD AUTO-RTO: 0 % (ref 0–1)
PLATELET # BLD AUTO: 297 X10(3)/MCL (ref 140–371)
PMV BLD AUTO: 9.9 FL (ref 9.4–12.4)
RBC # BLD AUTO: 3.76 X10(6)/MCL (ref 4–5.1)
WBC # SPEC AUTO: 12.3 X10(3)/MCL (ref 4–11.5)

## 2023-03-20 PROCEDURE — 36415 COLL VENOUS BLD VENIPUNCTURE: CPT

## 2023-03-20 PROCEDURE — 85025 COMPLETE CBC W/AUTO DIFF WBC: CPT

## 2023-03-20 PROCEDURE — 86780 TREPONEMA PALLIDUM: CPT

## 2023-03-20 PROCEDURE — 82950 GLUCOSE TEST: CPT

## 2023-03-21 ENCOUNTER — ROUTINE PRENATAL (OUTPATIENT)
Dept: OBSTETRICS AND GYNECOLOGY | Facility: CLINIC | Age: 28
End: 2023-03-21
Payer: MEDICAID

## 2023-03-21 VITALS — WEIGHT: 147 LBS | DIASTOLIC BLOOD PRESSURE: 56 MMHG | BODY MASS INDEX: 25.23 KG/M2 | SYSTOLIC BLOOD PRESSURE: 110 MMHG

## 2023-03-21 DIAGNOSIS — O99.343 DEPRESSION COMPLICATING PREGNANCY IN THIRD TRIMESTER, ANTEPARTUM: Primary | ICD-10-CM

## 2023-03-21 DIAGNOSIS — F32.A DEPRESSION COMPLICATING PREGNANCY IN THIRD TRIMESTER, ANTEPARTUM: Primary | ICD-10-CM

## 2023-03-21 DIAGNOSIS — F41.9 ANXIETY DURING PREGNANCY: ICD-10-CM

## 2023-03-21 DIAGNOSIS — Z3A.31 31 WEEKS GESTATION OF PREGNANCY: ICD-10-CM

## 2023-03-21 DIAGNOSIS — O99.519 ASTHMA DURING PREGNANCY: ICD-10-CM

## 2023-03-21 DIAGNOSIS — J45.909 ASTHMA DURING PREGNANCY: ICD-10-CM

## 2023-03-21 DIAGNOSIS — O99.340 ANXIETY DURING PREGNANCY: ICD-10-CM

## 2023-03-21 DIAGNOSIS — O99.322 MATERNAL DRUG USE COMPLICATING PREGNANCY IN SECOND TRIMESTER, ANTEPARTUM: ICD-10-CM

## 2023-03-21 LAB
BILIRUB UR QL STRIP: NORMAL
GLUCOSE UR QL STRIP: NEGATIVE
KETONES UR QL STRIP: NORMAL
LEUKOCYTE ESTERASE UR QL STRIP: NEGATIVE
PH, POC UA: NORMAL
POC BLOOD, URINE: NORMAL
POC NITRATES, URINE: NEGATIVE
PROT UR QL STRIP: NEGATIVE
SP GR UR STRIP: NORMAL (ref 1–1.03)
T PALLIDUM AB SER QL: NONREACTIVE
UROBILINOGEN UR STRIP-ACNC: NORMAL (ref 0.3–2.2)

## 2023-03-21 PROCEDURE — 99214 OFFICE O/P EST MOD 30 MIN: CPT | Mod: TH,,, | Performed by: OBSTETRICS & GYNECOLOGY

## 2023-03-21 PROCEDURE — 99214 PR OFFICE/OUTPT VISIT, EST, LEVL IV, 30-39 MIN: ICD-10-PCS | Mod: TH,,, | Performed by: OBSTETRICS & GYNECOLOGY

## 2023-03-22 ENCOUNTER — TELEPHONE (OUTPATIENT)
Dept: FAMILY MEDICINE | Facility: CLINIC | Age: 28
End: 2023-03-22
Payer: MEDICAID

## 2023-03-22 NOTE — TELEPHONE ENCOUNTER
Pt states that both wrist down to her finger tips hurt and tingle then they go numb. 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 She wants to know what can be done. Please advise.

## 2023-03-22 NOTE — TELEPHONE ENCOUNTER
----- Message from Loli Snowden sent at 3/22/2023  2:08 PM CDT -----  Regarding: call back  Pt called said for about a week now, her hands , finger tips, wrist have been going numb and hurting, wanting to know what this could possibly be from, asking for a call back please?        [phone]

## 2023-03-22 NOTE — TELEPHONE ENCOUNTER
Carpal tunnel syndrome if very common during pregnancy due to increased swelling. Can wear wrist cock up splints (available at local pharmacies).

## 2023-04-03 NOTE — PROGRESS NOTES
HPI  27 y.o.  at 33w4d here for OB check.  Doing well without Zoloft.     ROS  Review of Systems   Constitutional:  Negative for chills and fever.   Gastrointestinal:  Negative for abdominal pain, constipation and diarrhea.   Genitourinary:  Negative for flank pain, genital sores, pelvic pain, urgency, vaginal bleeding, vaginal discharge, vaginal pain, postcoital bleeding and vaginal odor.       OBJECTIVE  /60   Wt 66.5 kg (146 lb 9.6 oz)   BMI 25.16 kg/m²     Gen: No distress  Abdomen: Gravid, non-tender  Extremities: No edema  FHT: 135  FH: 33cm    ASSESSMENT  1. Depression complicating pregnancy in third trimester, antepartum    2. 33 weeks gestation of pregnancy  - POCT Urinalysis, Dipstick, Automated, W/O Scope    3. Maternal drug use complicating pregnancy in second trimester, antepartum    4. Anxiety during pregnancy    5. Asthma during pregnancy    6. Marijuana user        PLAN  Reviewed standard labor unit and kick count precautions.  Discussed pre-eclampsia precautions.  Discussed COVID-19 risks, social distancing, and isolation if respiratory symptoms develop.   Discussed tobacco avoidance and all associated risk to self and baby   RTC 2 weeks

## 2023-04-04 ENCOUNTER — ROUTINE PRENATAL (OUTPATIENT)
Dept: OBSTETRICS AND GYNECOLOGY | Facility: CLINIC | Age: 28
End: 2023-04-04
Payer: MEDICAID

## 2023-04-04 VITALS
DIASTOLIC BLOOD PRESSURE: 60 MMHG | BODY MASS INDEX: 25.16 KG/M2 | WEIGHT: 146.63 LBS | SYSTOLIC BLOOD PRESSURE: 112 MMHG

## 2023-04-04 DIAGNOSIS — Z3A.33 33 WEEKS GESTATION OF PREGNANCY: Primary | ICD-10-CM

## 2023-04-04 DIAGNOSIS — O99.340 ANXIETY DURING PREGNANCY: ICD-10-CM

## 2023-04-04 DIAGNOSIS — O99.519 ASTHMA DURING PREGNANCY: ICD-10-CM

## 2023-04-04 DIAGNOSIS — O99.322 MATERNAL DRUG USE COMPLICATING PREGNANCY IN SECOND TRIMESTER, ANTEPARTUM: ICD-10-CM

## 2023-04-04 DIAGNOSIS — F32.A DEPRESSION COMPLICATING PREGNANCY IN THIRD TRIMESTER, ANTEPARTUM: ICD-10-CM

## 2023-04-04 DIAGNOSIS — F41.9 ANXIETY DURING PREGNANCY: ICD-10-CM

## 2023-04-04 DIAGNOSIS — J45.909 ASTHMA DURING PREGNANCY: ICD-10-CM

## 2023-04-04 DIAGNOSIS — F12.90 MARIJUANA USER: ICD-10-CM

## 2023-04-04 DIAGNOSIS — O99.343 DEPRESSION COMPLICATING PREGNANCY IN THIRD TRIMESTER, ANTEPARTUM: ICD-10-CM

## 2023-04-04 LAB
BILIRUB UR QL STRIP: NORMAL
GLUCOSE UR QL STRIP: NORMAL
KETONES UR QL STRIP: NEGATIVE
LEUKOCYTE ESTERASE UR QL STRIP: NEGATIVE
PH, POC UA: NORMAL
POC BLOOD, URINE: NORMAL
POC NITRATES, URINE: NEGATIVE
PROT UR QL STRIP: NEGATIVE
SP GR UR STRIP: NORMAL (ref 1–1.03)
UROBILINOGEN UR STRIP-ACNC: NORMAL (ref 0.3–2.2)

## 2023-04-04 PROCEDURE — 99213 PR OFFICE/OUTPT VISIT, EST, LEVL III, 20-29 MIN: ICD-10-PCS | Mod: TH,,, | Performed by: OBSTETRICS & GYNECOLOGY

## 2023-04-04 PROCEDURE — 99213 OFFICE O/P EST LOW 20 MIN: CPT | Mod: TH,,, | Performed by: OBSTETRICS & GYNECOLOGY

## 2023-04-12 ENCOUNTER — TELEPHONE (OUTPATIENT)
Dept: OBSTETRICS AND GYNECOLOGY | Facility: CLINIC | Age: 28
End: 2023-04-12
Payer: MEDICAID

## 2023-04-17 ENCOUNTER — ROUTINE PRENATAL (OUTPATIENT)
Dept: OBSTETRICS AND GYNECOLOGY | Facility: CLINIC | Age: 28
End: 2023-04-17
Payer: MEDICAID

## 2023-04-17 VITALS — SYSTOLIC BLOOD PRESSURE: 116 MMHG | DIASTOLIC BLOOD PRESSURE: 72 MMHG | BODY MASS INDEX: 25.06 KG/M2 | WEIGHT: 146 LBS

## 2023-04-17 DIAGNOSIS — O99.519 ASTHMA DURING PREGNANCY: ICD-10-CM

## 2023-04-17 DIAGNOSIS — J45.909 ASTHMA DURING PREGNANCY: ICD-10-CM

## 2023-04-17 DIAGNOSIS — Z3A.35 35 WEEKS GESTATION OF PREGNANCY: ICD-10-CM

## 2023-04-17 DIAGNOSIS — F41.9 ANXIETY DURING PREGNANCY: ICD-10-CM

## 2023-04-17 DIAGNOSIS — O99.322 MATERNAL DRUG USE COMPLICATING PREGNANCY IN SECOND TRIMESTER, ANTEPARTUM: Primary | ICD-10-CM

## 2023-04-17 DIAGNOSIS — O99.343 DEPRESSION COMPLICATING PREGNANCY IN THIRD TRIMESTER, ANTEPARTUM: ICD-10-CM

## 2023-04-17 DIAGNOSIS — O99.340 ANXIETY DURING PREGNANCY: ICD-10-CM

## 2023-04-17 DIAGNOSIS — F32.A DEPRESSION COMPLICATING PREGNANCY IN THIRD TRIMESTER, ANTEPARTUM: ICD-10-CM

## 2023-04-17 PROCEDURE — 99213 OFFICE O/P EST LOW 20 MIN: CPT | Mod: TH,,, | Performed by: OBSTETRICS & GYNECOLOGY

## 2023-04-17 PROCEDURE — 99213 PR OFFICE/OUTPT VISIT, EST, LEVL III, 20-29 MIN: ICD-10-PCS | Mod: TH,,, | Performed by: OBSTETRICS & GYNECOLOGY

## 2023-04-24 ENCOUNTER — ROUTINE PRENATAL (OUTPATIENT)
Dept: OBSTETRICS AND GYNECOLOGY | Facility: CLINIC | Age: 28
End: 2023-04-24
Payer: MEDICAID

## 2023-04-24 VITALS — WEIGHT: 146 LBS | BODY MASS INDEX: 25.06 KG/M2 | SYSTOLIC BLOOD PRESSURE: 116 MMHG | DIASTOLIC BLOOD PRESSURE: 78 MMHG

## 2023-04-24 DIAGNOSIS — O99.340 ANXIETY DURING PREGNANCY: Primary | ICD-10-CM

## 2023-04-24 DIAGNOSIS — Z3A.36 36 WEEKS GESTATION OF PREGNANCY: ICD-10-CM

## 2023-04-24 DIAGNOSIS — O99.343 DEPRESSION COMPLICATING PREGNANCY IN THIRD TRIMESTER, ANTEPARTUM: ICD-10-CM

## 2023-04-24 DIAGNOSIS — F32.A DEPRESSION COMPLICATING PREGNANCY IN THIRD TRIMESTER, ANTEPARTUM: ICD-10-CM

## 2023-04-24 DIAGNOSIS — O99.519 ASTHMA DURING PREGNANCY: ICD-10-CM

## 2023-04-24 DIAGNOSIS — F41.9 ANXIETY DURING PREGNANCY: Primary | ICD-10-CM

## 2023-04-24 DIAGNOSIS — J45.909 ASTHMA DURING PREGNANCY: ICD-10-CM

## 2023-04-24 DIAGNOSIS — O99.322 MATERNAL DRUG USE COMPLICATING PREGNANCY IN SECOND TRIMESTER, ANTEPARTUM: ICD-10-CM

## 2023-04-24 PROCEDURE — 99214 PR OFFICE/OUTPT VISIT, EST, LEVL IV, 30-39 MIN: ICD-10-PCS | Mod: TH,,, | Performed by: OBSTETRICS & GYNECOLOGY

## 2023-04-24 PROCEDURE — 87653 STREP B DNA AMP PROBE: CPT | Performed by: OBSTETRICS & GYNECOLOGY

## 2023-04-24 PROCEDURE — 99214 OFFICE O/P EST MOD 30 MIN: CPT | Mod: TH,,, | Performed by: OBSTETRICS & GYNECOLOGY

## 2023-04-24 RX ORDER — DIPHENOXYLATE HYDROCHLORIDE AND ATROPINE SULFATE 2.5; .025 MG/1; MG/1
1 TABLET ORAL 4 TIMES DAILY PRN
Status: CANCELLED | OUTPATIENT
Start: 2023-04-24

## 2023-04-24 RX ORDER — MISOPROSTOL 100 UG/1
1000 TABLET ORAL ONCE AS NEEDED
Status: CANCELLED | OUTPATIENT
Start: 2023-04-24 | End: 2034-09-20

## 2023-04-24 RX ORDER — MUPIROCIN 20 MG/G
OINTMENT TOPICAL
Status: CANCELLED | OUTPATIENT
Start: 2023-04-24

## 2023-04-24 RX ORDER — CALCIUM CARBONATE 200(500)MG
500 TABLET,CHEWABLE ORAL 3 TIMES DAILY PRN
Status: CANCELLED | OUTPATIENT
Start: 2023-04-24

## 2023-04-24 RX ORDER — ACETAMINOPHEN 325 MG/1
650 TABLET ORAL EVERY 6 HOURS PRN
Status: CANCELLED | OUTPATIENT
Start: 2023-04-24

## 2023-04-24 RX ORDER — METHYLERGONOVINE MALEATE 0.2 MG/ML
200 INJECTION INTRAVENOUS
Status: CANCELLED | OUTPATIENT
Start: 2023-04-24

## 2023-04-24 RX ORDER — OXYTOCIN 10 [USP'U]/ML
10 INJECTION, SOLUTION INTRAMUSCULAR; INTRAVENOUS ONCE AS NEEDED
Status: CANCELLED | OUTPATIENT
Start: 2023-04-24 | End: 2034-09-20

## 2023-04-24 RX ORDER — OXYTOCIN/RINGER'S LACTATE 30/500 ML
0-30 PLASTIC BAG, INJECTION (ML) INTRAVENOUS CONTINUOUS
Status: CANCELLED | OUTPATIENT
Start: 2023-04-24

## 2023-04-24 RX ORDER — MISOPROSTOL 100 MCG
25 TABLET ORAL
Status: CANCELLED | OUTPATIENT
Start: 2023-04-24 | End: 2023-04-25

## 2023-04-24 RX ORDER — OXYTOCIN/RINGER'S LACTATE 30/500 ML
334 PLASTIC BAG, INJECTION (ML) INTRAVENOUS ONCE
Status: CANCELLED | OUTPATIENT
Start: 2023-04-24 | End: 2023-04-24

## 2023-04-24 RX ORDER — TERBUTALINE SULFATE 1 MG/ML
0.25 INJECTION SUBCUTANEOUS
Status: CANCELLED | OUTPATIENT
Start: 2023-04-24

## 2023-04-24 RX ORDER — OXYTOCIN/RINGER'S LACTATE 30/500 ML
334 PLASTIC BAG, INJECTION (ML) INTRAVENOUS ONCE AS NEEDED
Status: CANCELLED | OUTPATIENT
Start: 2023-04-24 | End: 2034-09-20

## 2023-04-24 RX ORDER — OXYTOCIN/RINGER'S LACTATE 30/500 ML
95 PLASTIC BAG, INJECTION (ML) INTRAVENOUS ONCE
Status: CANCELLED | OUTPATIENT
Start: 2023-04-24 | End: 2023-04-24

## 2023-04-24 RX ORDER — MISOPROSTOL 100 UG/1
1000 TABLET ORAL ONCE AS NEEDED
Status: CANCELLED | OUTPATIENT
Start: 2023-04-24

## 2023-04-24 RX ORDER — BUTORPHANOL TARTRATE 1 MG/ML
1 INJECTION INTRAMUSCULAR; INTRAVENOUS
Status: CANCELLED | OUTPATIENT
Start: 2023-04-24

## 2023-04-24 RX ORDER — SODIUM CHLORIDE, SODIUM LACTATE, POTASSIUM CHLORIDE, CALCIUM CHLORIDE 600; 310; 30; 20 MG/100ML; MG/100ML; MG/100ML; MG/100ML
INJECTION, SOLUTION INTRAVENOUS CONTINUOUS
Status: CANCELLED | OUTPATIENT
Start: 2023-04-24

## 2023-04-24 RX ORDER — SODIUM CHLORIDE 9 MG/ML
INJECTION, SOLUTION INTRAVENOUS
Status: CANCELLED | OUTPATIENT
Start: 2023-04-24

## 2023-04-24 RX ORDER — LIDOCAINE HYDROCHLORIDE 10 MG/ML
10 INJECTION INFILTRATION; PERINEURAL ONCE AS NEEDED
Status: CANCELLED | OUTPATIENT
Start: 2023-04-24 | End: 2034-09-20

## 2023-04-24 RX ORDER — ONDANSETRON 4 MG/1
4 TABLET, ORALLY DISINTEGRATING ORAL EVERY 8 HOURS PRN
Status: CANCELLED | OUTPATIENT
Start: 2023-04-24

## 2023-04-24 RX ORDER — SIMETHICONE 80 MG
1 TABLET,CHEWABLE ORAL 4 TIMES DAILY PRN
Status: CANCELLED | OUTPATIENT
Start: 2023-04-24

## 2023-04-24 RX ORDER — MISOPROSTOL 100 UG/1
1000 TABLET ORAL
Status: CANCELLED | OUTPATIENT
Start: 2023-04-24

## 2023-04-24 RX ORDER — BUTORPHANOL TARTRATE 1 MG/ML
2 INJECTION INTRAMUSCULAR; INTRAVENOUS
Status: CANCELLED | OUTPATIENT
Start: 2023-04-24

## 2023-04-24 RX ORDER — ONDANSETRON 2 MG/ML
4 INJECTION INTRAMUSCULAR; INTRAVENOUS EVERY 6 HOURS PRN
Status: CANCELLED | OUTPATIENT
Start: 2023-04-24

## 2023-04-24 RX ORDER — CARBOPROST TROMETHAMINE 250 UG/ML
250 INJECTION, SOLUTION INTRAMUSCULAR
Status: CANCELLED | OUTPATIENT
Start: 2023-04-24

## 2023-04-24 RX ORDER — OXYTOCIN/RINGER'S LACTATE 30/500 ML
95 PLASTIC BAG, INJECTION (ML) INTRAVENOUS ONCE AS NEEDED
Status: CANCELLED | OUTPATIENT
Start: 2023-04-24 | End: 2034-09-20

## 2023-04-24 NOTE — PROGRESS NOTES
History & Physical    SUBJECTIVE:     History of Present Illness:  Patient is a 27 y.o. female  at 36w3d IVANNA 23 here today for ob pre admit       Chief Complaint   Patient presents with    pre-admit OB       Review of patient's allergies indicates:   Allergen Reactions    Iodinated contrast media Shortness Of Breath    Iodine Rash and Swelling    Shrimp        Current Outpatient Medications   Medication Sig Dispense Refill    albuterol (PROVENTIL) 2.5 mg /3 mL (0.083 %) nebulizer solution Take 3 mLs (2.5 mg total) by nebulization every 6 (six) hours as needed for Wheezing or Shortness of Breath. Rescue 90 mL 0    albuterol (VENTOLIN HFA) 90 mcg/actuation inhaler Inhale 2 puffs into the lungs every 6 (six) hours as needed for Wheezing or Shortness of Breath. Rescue 18 g 11    azithromycin (Z-FLORESITA) 250 MG tablet Take 2 tablets by mouth on day 1; Take 1 tablet by mouth on days 2-5 6 tablet 0    nebulizer accessories Kit by Misc.(Non-Drug; Combo Route) route.      nitrofurantoin, macrocrystal-monohydrate, (MACROBID) 100 MG capsule Take 1 capsule by mouth twice daily for 7 days (Patient not taking: Reported on 2023) 14 capsule 0    ondansetron (ZOFRAN) 4 MG tablet Take 2 tablets (8 mg total) by mouth every 6 (six) hours as needed for Nausea. (Patient not taking: Reported on 2023) 10 tablet 0    predniSONE (DELTASONE) 20 MG tablet Take 1 tablet (20 mg total) by mouth once daily. (Patient not taking: Reported on 2023) 5 tablet 0    prenatal vit/iron fum/folic ac (PRENATAL 1+1 ORAL) Take by mouth.      sertraline (ZOLOFT) 25 MG tablet Take 1 tablet (25 mg total) by mouth once daily. (Patient not taking: Reported on 2023) 30 tablet 6     No current facility-administered medications for this visit.       History reviewed. No pertinent past medical history.  Past Surgical History:   Procedure Laterality Date    CHOLECYSTECTOMY       Family History   Family history unknown: Yes     Social History  "    Tobacco Use    Smoking status: Every Day     Packs/day: 0.25     Types: Cigarettes    Smokeless tobacco: Never    Tobacco comments:     3/21/23 per pt "I spoke  only 1-3 cigarettes a day."   Substance Use Topics    Alcohol use: Not Currently    Drug use: Not Currently     Types: Marijuana        Review of Systems:  Review of Systems   Constitutional: Negative.    Respiratory: Negative.     Cardiovascular: Negative.    Gastrointestinal: Negative.    Genitourinary: Negative.    Musculoskeletal: Negative.      OBJECTIVE:     Vital Signs (Most Recent)  BP: 116/78 (04/24/23 0849)     66.2 kg (146 lb)     Physical Exam:  Physical Exam  Gen: No distress  Abdomen: Gravid, non-tender  Extremities: No edema  Cervix: 0/0/-3  FHT:145  FH:36cm      ASSESSMENT/PLAN:     1. Anxiety during pregnancy  - Admit to Inpatient; Standing  - Full code; Standing  - Vital Signs; Standing  - Vital signs- Early Labor(0-4 cm); Standing  - Vital Signs- Active Labor (4-10 cm); Standing  - Vital Signs Post Delivery; Standing  - Check Temperature, Membranes Intact; Standing  - Check Temperature, Membranes Ruptured; Standing  - Pulse Oximetry Continous while CONTINUOUS electronic fetal Monitor in use; Standing  - Cervical Exam; Standing  - Fetal / Uterine Monitoring - Continuous; Standing  - Fetal monitoring - scalp electrode; Standing  - Insert peripheral IV; Standing  - Straight Cath; Standing  - Macdonald to Gravity; Standing  - Macdonald Catheter Care every 12 hours; Standing  - Nurse to discontinue macdonald when patient no longer meets criteria; Standing  - Post Macdonald Catheter Removal Protocol; Standing  - Watch for excessive vaginal bleeding; Standing  - Decrease Oxytocin; Standing  - Discontinue oxytocin; Standing  - Oxytocin Titration Resumption; Standing  - Amnioinfusion PRN recurrent variable decelerations; Standing  - Administer a 500 -1000 ml IV fluid bolus as needed for; Standing  - Assess fundal height/uterine firmness, lochia, perineum; " Standing  - Saline lock IV, immediately post-delivery; Standing  - Apply ice to perineum, immediate post-delivery; Standing  - Check Temperature Post - Op; Standing  - Notify Physician; Standing  - Notify physician of cervical change or lack of change; Standing  - Notify physician for excessive uterine activity; Standing  - Notify physician for Fetal Heart Tones consistent with Category II or Category III tracing; Standing  - Notify Pediatrician after delivery; Standing  - Notify Nursery / Level II Nursery ; Standing  - Notify Nursery / Level II Nursery; Standing  - Abdominal prep for  Section; Standing  - Chlorhexidine (CHG) 2% Wipes; Standing  - Place sequential compression device; Standing  - Chlorohexidine Gluconate Bath; Standing  - External fetal and uterine  monitoring; Standing  - Vital signs every 15 minutes; Standing  - Saline lock IV; Standing  - Perform baseline 20 minute non stress test - proceed with other orders if reactive criteria met.; Standing  - miSOPROStol split tablet 25 mcg  - Maternal vital signs, fetal and uterine assessments after each dose and hourly; Standing  - Electronic fetal monitoring; Standing  - May ambulate 30 minutes after insertion of Misoprostol (Cytotec); Standing  - Withhold Misoprostol (Cytotec) dosing:; Standing  - Oxytocin should be delayed until at least 4 hours after the last Misoprostol (Cytotec) dose administered; Standing  - Vital signs every 15 minutes; Standing  - miSOPROStoL tablet 1,000 mcg  - miSOPROStoL tablet 1,000 mcg  - miSOPROStoL tablet 1,000 mcg  - Urinalysis, Reflex to Urine Culture; Standing  - CBC with Auto Differential; Standing  - Comprehensive metabolic panel; Standing  - HIV 1/2 Ag/Ab (4th Gen); Standing  - Hepatitis B surface antigen; Standing  - CBC with Auto Differential; Standing  - Comprehensive metabolic panel; Standing  - Type & Screen; Standing  - SYPHILIS ANTIBODY (WITH REFLEX RPR); Standing  - HIV 1/2 Ag/Ab (4th Gen); Standing  -  Hepatitis B surface antigen; Standing  - Drug Screen, Urine; Standing  - Inpatient consult to Anesthesiology; Standing    2. 36 weeks gestation of pregnancy  - POCT Urinalysis, Dipstick, Automated, W/O Scope  - MDL Sendout Test  - Strep Group B by PCR; Future    3. Depression complicating pregnancy in third trimester, antepartum  - Admit to Inpatient; Standing  - Full code; Standing  - Vital Signs; Standing  - Vital signs- Early Labor(0-4 cm); Standing  - Vital Signs- Active Labor (4-10 cm); Standing  - Vital Signs Post Delivery; Standing  - Check Temperature, Membranes Intact; Standing  - Check Temperature, Membranes Ruptured; Standing  - Pulse Oximetry Continous while CONTINUOUS electronic fetal Monitor in use; Standing  - Cervical Exam; Standing  - Fetal / Uterine Monitoring - Continuous; Standing  - Fetal monitoring - scalp electrode; Standing  - Insert peripheral IV; Standing  - Straight Cath; Standing  - Macdonald to Gravity; Standing  - Macdonald Catheter Care every 12 hours; Standing  - Nurse to discontinue macdonald when patient no longer meets criteria; Standing  - Post Macdonald Catheter Removal Protocol; Standing  - Watch for excessive vaginal bleeding; Standing  - Decrease Oxytocin; Standing  - Discontinue oxytocin; Standing  - Oxytocin Titration Resumption; Standing  - Amnioinfusion PRN recurrent variable decelerations; Standing  - Administer a 500 -1000 ml IV fluid bolus as needed for; Standing  - Assess fundal height/uterine firmness, lochia, perineum; Standing  - Saline lock IV, immediately post-delivery; Standing  - Apply ice to perineum, immediate post-delivery; Standing  - Check Temperature Post - Op; Standing  - Notify Physician; Standing  - Notify physician of cervical change or lack of change; Standing  - Notify physician for excessive uterine activity; Standing  - Notify physician for Fetal Heart Tones consistent with Category II or Category III tracing; Standing  - Notify Pediatrician after delivery;  Standing  - Notify Nursery / Level II Nursery ; Standing  - Notify Nursery / Level II Nursery; Standing  - Abdominal prep for  Section; Standing  - Chlorhexidine (CHG) 2% Wipes; Standing  - Place sequential compression device; Standing  - Chlorohexidine Gluconate Bath; Standing  - External fetal and uterine  monitoring; Standing  - Vital signs every 15 minutes; Standing  - Saline lock IV; Standing  - Perform baseline 20 minute non stress test - proceed with other orders if reactive criteria met.; Standing  - miSOPROStol split tablet 25 mcg  - Maternal vital signs, fetal and uterine assessments after each dose and hourly; Standing  - Electronic fetal monitoring; Standing  - May ambulate 30 minutes after insertion of Misoprostol (Cytotec); Standing  - Withhold Misoprostol (Cytotec) dosing:; Standing  - Oxytocin should be delayed until at least 4 hours after the last Misoprostol (Cytotec) dose administered; Standing  - Vital signs every 15 minutes; Standing  - miSOPROStoL tablet 1,000 mcg  - miSOPROStoL tablet 1,000 mcg  - miSOPROStoL tablet 1,000 mcg  - Urinalysis, Reflex to Urine Culture; Standing  - CBC with Auto Differential; Standing  - Comprehensive metabolic panel; Standing  - HIV 1/2 Ag/Ab (4th Gen); Standing  - Hepatitis B surface antigen; Standing  - CBC with Auto Differential; Standing  - Comprehensive metabolic panel; Standing  - Type & Screen; Standing  - SYPHILIS ANTIBODY (WITH REFLEX RPR); Standing  - HIV 1/2 Ag/Ab (4th Gen); Standing  - Hepatitis B surface antigen; Standing  - Drug Screen, Urine; Standing  - Inpatient consult to Anesthesiology; Standing    4. Maternal drug use complicating pregnancy in second trimester, antepartum  - Admit to Inpatient; Standing  - Full code; Standing  - Vital Signs; Standing  - Vital signs- Early Labor(0-4 cm); Standing  - Vital Signs- Active Labor (4-10 cm); Standing  - Vital Signs Post Delivery; Standing  - Check Temperature, Membranes Intact; Standing  -  Check Temperature, Membranes Ruptured; Standing  - Pulse Oximetry Continous while CONTINUOUS electronic fetal Monitor in use; Standing  - Cervical Exam; Standing  - Fetal / Uterine Monitoring - Continuous; Standing  - Fetal monitoring - scalp electrode; Standing  - Insert peripheral IV; Standing  - Straight Cath; Standing  - Macdonald to Gravity; Standing  - Macdonald Catheter Care every 12 hours; Standing  - Nurse to discontinue macdonald when patient no longer meets criteria; Standing  - Post Macdonald Catheter Removal Protocol; Standing  - Watch for excessive vaginal bleeding; Standing  - Decrease Oxytocin; Standing  - Discontinue oxytocin; Standing  - Oxytocin Titration Resumption; Standing  - Amnioinfusion PRN recurrent variable decelerations; Standing  - Administer a 500 -1000 ml IV fluid bolus as needed for; Standing  - Assess fundal height/uterine firmness, lochia, perineum; Standing  - Saline lock IV, immediately post-delivery; Standing  - Apply ice to perineum, immediate post-delivery; Standing  - Check Temperature Post - Op; Standing  - Notify Physician; Standing  - Notify physician of cervical change or lack of change; Standing  - Notify physician for excessive uterine activity; Standing  - Notify physician for Fetal Heart Tones consistent with Category II or Category III tracing; Standing  - Notify Pediatrician after delivery; Standing  - Notify Nursery / Level II Nursery ; Standing  - Notify Nursery / Level II Nursery; Standing  - Abdominal prep for  Section; Standing  - Chlorhexidine (CHG) 2% Wipes; Standing  - Place sequential compression device; Standing  - Chlorohexidine Gluconate Bath; Standing  - External fetal and uterine  monitoring; Standing  - Vital signs every 15 minutes; Standing  - Saline lock IV; Standing  - Perform baseline 20 minute non stress test - proceed with other orders if reactive criteria met.; Standing  - miSOPROStol split tablet 25 mcg  - Maternal vital signs, fetal and uterine  assessments after each dose and hourly; Standing  - Electronic fetal monitoring; Standing  - May ambulate 30 minutes after insertion of Misoprostol (Cytotec); Standing  - Withhold Misoprostol (Cytotec) dosing:; Standing  - Oxytocin should be delayed until at least 4 hours after the last Misoprostol (Cytotec) dose administered; Standing  - Vital signs every 15 minutes; Standing  - miSOPROStoL tablet 1,000 mcg  - miSOPROStoL tablet 1,000 mcg  - miSOPROStoL tablet 1,000 mcg  - Urinalysis, Reflex to Urine Culture; Standing  - CBC with Auto Differential; Standing  - Comprehensive metabolic panel; Standing  - HIV 1/2 Ag/Ab (4th Gen); Standing  - Hepatitis B surface antigen; Standing  - CBC with Auto Differential; Standing  - Comprehensive metabolic panel; Standing  - Type & Screen; Standing  - SYPHILIS ANTIBODY (WITH REFLEX RPR); Standing  - HIV 1/2 Ag/Ab (4th Gen); Standing  - Hepatitis B surface antigen; Standing  - Drug Screen, Urine; Standing  - Inpatient consult to Anesthesiology; Standing    5. Asthma during pregnancy  - Admit to Inpatient; Standing  - Full code; Standing  - Vital Signs; Standing  - Vital signs- Early Labor(0-4 cm); Standing  - Vital Signs- Active Labor (4-10 cm); Standing  - Vital Signs Post Delivery; Standing  - Check Temperature, Membranes Intact; Standing  - Check Temperature, Membranes Ruptured; Standing  - Pulse Oximetry Continous while CONTINUOUS electronic fetal Monitor in use; Standing  - Cervical Exam; Standing  - Fetal / Uterine Monitoring - Continuous; Standing  - Fetal monitoring - scalp electrode; Standing  - Insert peripheral IV; Standing  - Straight Cath; Standing  - Macdonald to Gravity; Standing  - Macdonald Catheter Care every 12 hours; Standing  - Nurse to discontinue macdonald when patient no longer meets criteria; Standing  - Post Macdonald Catheter Removal Protocol; Standing  - Watch for excessive vaginal bleeding; Standing  - Decrease Oxytocin; Standing  - Discontinue oxytocin; Standing  -  Oxytocin Titration Resumption; Standing  - Amnioinfusion PRN recurrent variable decelerations; Standing  - Administer a 500 -1000 ml IV fluid bolus as needed for; Standing  - Assess fundal height/uterine firmness, lochia, perineum; Standing  - Saline lock IV, immediately post-delivery; Standing  - Apply ice to perineum, immediate post-delivery; Standing  - Check Temperature Post - Op; Standing  - Notify Physician; Standing  - Notify physician of cervical change or lack of change; Standing  - Notify physician for excessive uterine activity; Standing  - Notify physician for Fetal Heart Tones consistent with Category II or Category III tracing; Standing  - Notify Pediatrician after delivery; Standing  - Notify Nursery / Level II Nursery ; Standing  - Notify Nursery / Level II Nursery; Standing  - Abdominal prep for  Section; Standing  - Chlorhexidine (CHG) 2% Wipes; Standing  - Place sequential compression device; Standing  - Chlorohexidine Gluconate Bath; Standing  - External fetal and uterine  monitoring; Standing  - Vital signs every 15 minutes; Standing  - Saline lock IV; Standing  - Perform baseline 20 minute non stress test - proceed with other orders if reactive criteria met.; Standing  - miSOPROStol split tablet 25 mcg  - Maternal vital signs, fetal and uterine assessments after each dose and hourly; Standing  - Electronic fetal monitoring; Standing  - May ambulate 30 minutes after insertion of Misoprostol (Cytotec); Standing  - Withhold Misoprostol (Cytotec) dosing:; Standing  - Oxytocin should be delayed until at least 4 hours after the last Misoprostol (Cytotec) dose administered; Standing  - Vital signs every 15 minutes; Standing  - miSOPROStoL tablet 1,000 mcg  - miSOPROStoL tablet 1,000 mcg  - miSOPROStoL tablet 1,000 mcg  - Urinalysis, Reflex to Urine Culture; Standing  - CBC with Auto Differential; Standing  - Comprehensive metabolic panel; Standing  - HIV 1/2 Ag/Ab (4th Gen); Standing  -  Hepatitis B surface antigen; Standing  - CBC with Auto Differential; Standing  - Comprehensive metabolic panel; Standing  - Type & Screen; Standing  - SYPHILIS ANTIBODY (WITH REFLEX RPR); Standing  - HIV 1/2 Ag/Ab (4th Gen); Standing  - Hepatitis B surface antigen; Standing  - Drug Screen, Urine; Standing  - Inpatient consult to Anesthesiology; Standing        PLAN:    Discussed delivery process and plan.  Consent given for delivery.   Discussed labor unit, kick count, pre-eclampsia, rupture   membrane precautions.   GBS    Reviewed standard labor unit and kick count precautions.  Discussed pre-eclampsia precautions.  Discussed COVID-19 risks, social distancing, and isolation if respiratory symptoms develop.   RTC 1 week

## 2023-04-25 LAB — STREP B PCR (OHS): DETECTED

## 2023-05-01 ENCOUNTER — TELEPHONE (OUTPATIENT)
Dept: OBSTETRICS AND GYNECOLOGY | Facility: CLINIC | Age: 28
End: 2023-05-01
Payer: MEDICAID

## 2023-05-04 ENCOUNTER — ROUTINE PRENATAL (OUTPATIENT)
Dept: OBSTETRICS AND GYNECOLOGY | Facility: CLINIC | Age: 28
End: 2023-05-04
Payer: MEDICAID

## 2023-05-04 VITALS
BODY MASS INDEX: 25.09 KG/M2 | SYSTOLIC BLOOD PRESSURE: 120 MMHG | WEIGHT: 146.19 LBS | DIASTOLIC BLOOD PRESSURE: 68 MMHG

## 2023-05-04 DIAGNOSIS — F41.9 ANXIETY DURING PREGNANCY: Primary | ICD-10-CM

## 2023-05-04 DIAGNOSIS — O99.343 DEPRESSION COMPLICATING PREGNANCY IN THIRD TRIMESTER, ANTEPARTUM: ICD-10-CM

## 2023-05-04 DIAGNOSIS — J45.909 ASTHMA DURING PREGNANCY: ICD-10-CM

## 2023-05-04 DIAGNOSIS — O99.519 ASTHMA DURING PREGNANCY: ICD-10-CM

## 2023-05-04 DIAGNOSIS — Z3A.37 37 WEEKS GESTATION OF PREGNANCY: ICD-10-CM

## 2023-05-04 DIAGNOSIS — O99.340 ANXIETY DURING PREGNANCY: Primary | ICD-10-CM

## 2023-05-04 DIAGNOSIS — F32.A DEPRESSION COMPLICATING PREGNANCY IN THIRD TRIMESTER, ANTEPARTUM: ICD-10-CM

## 2023-05-04 PROCEDURE — 99213 PR OFFICE/OUTPT VISIT, EST, LEVL III, 20-29 MIN: ICD-10-PCS | Mod: TH,,,

## 2023-05-04 PROCEDURE — 99213 OFFICE O/P EST LOW 20 MIN: CPT | Mod: TH,,,

## 2023-05-04 NOTE — PROGRESS NOTES
"HPI  27 y.o.  at 38w5d here for OB check.  She was seen on [facility] yesterday for abdominal pain. She has a FHR decel when she was first placed on the monitor.  Fetal testing improved quickly with hydration and she had 4-5 hours of reactive, cat 1 fetal testing. Feels alittle better today.  Still c/o abd pain/ctxs. a      ROS  Review of Systems   Constitutional:  Negative for chills and fever.   Gastrointestinal:  Negative for abdominal pain, constipation and diarrhea.   Genitourinary:  Negative for flank pain, genital sores, pelvic pain, urgency, vaginal bleeding, vaginal discharge, vaginal pain, postcoital bleeding and vaginal odor.       OBJECTIVE  /72   Temp 97.9 °F (36.6 °C)   Ht 5' 4" (1.626 m)   Wt 66.9 kg (147 lb 7.8 oz)   LMP 2022 (Approximate) Comment: 5 months pregnant, does not know weeks  BMI 25.32 kg/m²     Gen: No distress  Abdomen: Gravid, non-tender  Extremities: No edema  Cervix:    NST:   - Baseline: 120  - Variability: moderate  - Accelerations: PRESENT: 20X20   - Decelerations: ABSENT  - Time on monitor: 20 min  - Category: .reactive, category 1    [place]:   - CTX: Irritability        ASSESSMENT  1. Maternal drug use complicating pregnancy in second trimester, antepartum    2. 38 weeks gestation of pregnancy  - POCT Urinalysis, Dipstick, Automated, W/O Scope    3. Anxiety during pregnancy    4. Depression complicating pregnancy in third trimester, antepartum    5. Asthma during pregnancy    6. Abdominal pain affecting pregnancy, antepartum        PLAN  Reviewed standard labor unit and kick count precautions.  Discussed pre-eclampsia precautions.  Discussed COVID-19 risks, social distancing, and isolation if respiratory symptoms develop.   Cont PO hydration  Will plan for induction at 39 wks Friday.  Discussed unfavorable cervix and risk of c/s  RTC postpartum              "

## 2023-05-04 NOTE — PROGRESS NOTES
Subjective:      Patient ID: Jamia Smith is a 27 y.o. female.    Chief Complaint:  Routine Prenatal Visit (Denies problems today, feeling baby move frequently )      History of Present Illness  HPI  Jamia Smith 27 y.o. White female  presents to clinic at 37w6d for her routine tummy check. Pt denies any complaints today.      GYN & OB History  Patient's last menstrual period was 2022 (approximate).   Date of Last Pap: 2/15/2022    OB History    Para Term  AB Living   1             SAB IAB Ectopic Multiple Live Births                  # Outcome Date GA Lbr Meir/2nd Weight Sex Delivery Anes PTL Lv   1 Current                Review of Systems  Review of Systems  General/Constitutional: Fever denies .    Skin: Rash denies.   Respiratory: Cough denies . SOB denies . Wheezing denies .   Cardiovascular: Chest pain denies . Dizziness denies . Palpitations denies . Swelling in hands/feet denies    Gastrointestinal: Abdominal pain denies . Constipation denies . Diarrhea denies . Heartburn denies . Nausea denies . Vomiting denies    Genitourinary: Painful urination denies.   Gynecologic: Vaginal bleeding denies . Vaginal discharge Normal. Leaking amniotic fluid denies.  Contractions denies    Psychiatric: Depressed mood denies. Suicidal thoughts denies.        Objective:     /68   Wt 66.3 kg (146 lb 2.6 oz)   LMP 2022 (Approximate) Comment: 5 months pregnant, does not know weeks  BMI 25.09 kg/m²     Physical Exam   Gen: NAD   Abd: Gravid, NT   Ext: No CCE   FH: 38  FHT: 145     Assessment:     1. Anxiety during pregnancy    2. 37 weeks gestation of pregnancy    3. Asthma during pregnancy    4. Depression complicating pregnancy in third trimester, antepartum          Plan:   Reports + FM, denies VB, LOF or regular CTX.  Doing well without concerns. Fundal Height appropriate for gestation. Reviewed warning signs, normal fetal movement, labor precautions and how/when to call.  RTC  x 1 wk, call or present sooner prn.     Reviewed standard labor unit and kick count precautions.  Discussed pre-eclampsia precautions.  Discussed COVID-19 risks, social distancing, and isolation if respiratory symptoms develop.

## 2023-05-08 ENCOUNTER — HOSPITAL ENCOUNTER (OUTPATIENT)
Dept: PREADMISSION TESTING | Facility: HOSPITAL | Age: 28
Discharge: HOME | End: 2023-05-08
Payer: MEDICAID

## 2023-05-08 DIAGNOSIS — O99.343 DEPRESSION COMPLICATING PREGNANCY IN THIRD TRIMESTER, ANTEPARTUM: ICD-10-CM

## 2023-05-08 DIAGNOSIS — F32.A DEPRESSION COMPLICATING PREGNANCY IN THIRD TRIMESTER, ANTEPARTUM: ICD-10-CM

## 2023-05-08 DIAGNOSIS — O99.340 ANXIETY DURING PREGNANCY: ICD-10-CM

## 2023-05-08 DIAGNOSIS — J45.909 ASTHMA DURING PREGNANCY: ICD-10-CM

## 2023-05-08 DIAGNOSIS — O99.519 ASTHMA DURING PREGNANCY: ICD-10-CM

## 2023-05-08 DIAGNOSIS — O99.322 MATERNAL DRUG USE COMPLICATING PREGNANCY IN SECOND TRIMESTER, ANTEPARTUM: ICD-10-CM

## 2023-05-08 DIAGNOSIS — F41.9 ANXIETY DURING PREGNANCY: ICD-10-CM

## 2023-05-08 LAB
BASOPHILS # BLD AUTO: 0.04 X10(3)/MCL (ref 0.01–0.08)
BASOPHILS NFR BLD AUTO: 0.3 % (ref 0.1–1.2)
EOSINOPHIL # BLD AUTO: 0.2 X10(3)/MCL (ref 0.04–0.36)
EOSINOPHIL NFR BLD AUTO: 1.6 % (ref 0.7–7)
ERYTHROCYTE [DISTWIDTH] IN BLOOD BY AUTOMATED COUNT: 14.4 % (ref 11–14.5)
HBV SURFACE AG SERPL QL IA: NEGATIVE
HBV SURFACE AG SERPL QL IA: NORMAL
HCT VFR BLD AUTO: 36.8 % (ref 36–48)
HGB BLD-MCNC: 12.2 G/DL (ref 11.8–16)
HIV 1+2 AB+HIV1 P24 AG SERPL QL IA: NONREACTIVE
IMM GRANULOCYTES # BLD AUTO: 0.11 X10(3)/MCL (ref 0–0.03)
IMM GRANULOCYTES NFR BLD AUTO: 0.9 % (ref 0–0.5)
LYMPHOCYTES # BLD AUTO: 1.58 X10(3)/MCL (ref 1.16–3.74)
LYMPHOCYTES NFR BLD AUTO: 12.9 % (ref 20–55)
MCH RBC QN AUTO: 29.3 PG (ref 27–34)
MCHC RBC AUTO-ENTMCNC: 33.2 G/DL (ref 31–37)
MCV RBC AUTO: 88.5 FL (ref 79–99)
MONOCYTES # BLD AUTO: 1.11 X10(3)/MCL (ref 0.24–0.36)
MONOCYTES NFR BLD AUTO: 9 % (ref 4.7–12.5)
NEUTROPHILS # BLD AUTO: 9.24 X10(3)/MCL (ref 1.56–6.13)
NEUTROPHILS NFR BLD AUTO: 75.3 % (ref 37–73)
NRBC BLD AUTO-RTO: 0 % (ref 0–1)
PLATELET # BLD AUTO: 319 X10(3)/MCL (ref 140–371)
PMV BLD AUTO: 11.1 FL (ref 9.4–12.4)
RBC # BLD AUTO: 4.16 X10(6)/MCL (ref 4–5.1)
T PALLIDUM AB SER QL: NONREACTIVE
WBC # SPEC AUTO: 12.28 X10(3)/MCL (ref 4–11.5)

## 2023-05-08 PROCEDURE — 87389 HIV-1 AG W/HIV-1&-2 AB AG IA: CPT | Performed by: OBSTETRICS & GYNECOLOGY

## 2023-05-08 PROCEDURE — 87340 HEPATITIS B SURFACE AG IA: CPT | Performed by: OBSTETRICS & GYNECOLOGY

## 2023-05-08 PROCEDURE — 85025 COMPLETE CBC W/AUTO DIFF WBC: CPT | Performed by: OBSTETRICS & GYNECOLOGY

## 2023-05-08 PROCEDURE — 86780 TREPONEMA PALLIDUM: CPT | Performed by: OBSTETRICS & GYNECOLOGY

## 2023-05-08 NOTE — DISCHARGE INSTRUCTIONS
Things to Bring to the Hospital    To help make your stay as comfortable as possible you should bring the following items when you come to the hospital:    TOOTHBRUSH  TOOTHPASTE  SHAMPOO/CONDITIONER  DEODORANT  HAIRBRUSH OR COMB  UNDERWEAR  SOAP  SANITARY NAPKINS-OVERNIGHTS (This item is optional unless you have a preference for the type you use.)  NURSING PADS  BREAST PUMP IF YOU OWN ONE    Other items needed for both mom and baby include:    Bras (2-3).  These should be ones that were worn during pregnancy or nursing bras for breastfeeding.   Gowns, slippers, and a robe.   One outfit of baby clothes to take the baby home in as well as a blanket. The hospital will provide baby clothes, blankets, diapers, and wipes during the hospital stay.   Federally approved car seat is required for the infant to go home.    OB VISITATION POLICY    The OB Department is open to family for patient visitation.  Smoking is not allowed on the premises.  Visitors must check with staff before entering a patient's room if there is a sign posted on the door or in the more.   Visitors may be in the room with baby if they are free from infection or any signs and symptoms of illness.  All visitors must use hand  or soap and water before touching the baby.   No children under age 12 are allowed to sleep overnight or to be left unattended in room.   Our rooms only accommodate one overnight guest.  While in labor and delivery room, you will be allowed 2 support persons if you choose and one additional guest will be allowed at the discretion of the physician or nurse.   The 2-3 labor and delivery support people may be interchangeable at the discretion of the OB nurse in charge of your care.   Any other visitors will be directed to the waiting room or post-partum room until after delivery.   No one will be allowed to stay in the labor and delivery hallway.  After delivery, visitors will not be limited unless a procedure is in progress  or your conditions warrants it.   Every day between 1:00 and 3:00 pm visitors are discouraged from entering the OB unit to encourage a mother/baby rest and bonding time.   Videos and photographs are not allowed to be taken during the delivery process. They may be taken after the baby is born and declared in good condition by nursing staff or physician.   If you will be having a delivery by , you will be allowed (1) support person in operating room unless the procedure is an emergency or under general anesthesia. No video or photography is allowed until after the procedure is complete.  The support person present will be asked to leave the operating room with the baby after delivery or if any problems arise.   Family and friends should be made familiar with the visitor policy for our facility, so that we may insure that the safety and well-being of you and your baby during this very important time.

## 2023-05-08 NOTE — PRE-PROCEDURE INSTRUCTIONS
OB pre-admit instructions given for upcoming delivery. Patient states understanding of information. Encouraged any questions and concerns. Lab work completed. Patient preference includes bottle feeding and would like Dr. An to take care of infant during hospital stay.

## 2023-05-09 ENCOUNTER — HOSPITAL ENCOUNTER (OUTPATIENT)
Facility: HOSPITAL | Age: 28
Discharge: HOME | End: 2023-05-09
Attending: OBSTETRICS & GYNECOLOGY | Admitting: OBSTETRICS & GYNECOLOGY
Payer: MEDICAID

## 2023-05-09 DIAGNOSIS — O26.899 ABDOMINAL PAIN AFFECTING PREGNANCY, ANTEPARTUM: ICD-10-CM

## 2023-05-09 DIAGNOSIS — R10.9 ABDOMINAL PAIN AFFECTING PREGNANCY, ANTEPARTUM: ICD-10-CM

## 2023-05-09 LAB
APPEARANCE UR: ABNORMAL
BACTERIA #/AREA URNS AUTO: ABNORMAL /HPF
BILIRUB UR QL STRIP.AUTO: NEGATIVE MG/DL
COLOR UR AUTO: YELLOW
GLUCOSE UR QL STRIP.AUTO: NEGATIVE MG/DL
KETONES UR QL STRIP.AUTO: 40 MG/DL
LEUKOCYTE ESTERASE UR QL STRIP.AUTO: ABNORMAL UNIT/L
NITRITE UR QL STRIP.AUTO: NEGATIVE
PH UR STRIP.AUTO: 6.5 [PH]
PROT UR QL STRIP.AUTO: NEGATIVE MG/DL
RBC #/AREA URNS AUTO: ABNORMAL /HPF
RBC UR QL AUTO: NEGATIVE UNIT/L
SP GR UR STRIP.AUTO: 1.02
SQUAMOUS #/AREA URNS AUTO: ABNORMAL /HPF
UROBILINOGEN UR STRIP-ACNC: 1 MG/DL
WBC #/AREA URNS AUTO: ABNORMAL /HPF

## 2023-05-09 PROCEDURE — 59025 PR FETAL 2N-STRESS TEST: ICD-10-PCS | Mod: 26,,, | Performed by: OBSTETRICS & GYNECOLOGY

## 2023-05-09 PROCEDURE — 81001 URINALYSIS AUTO W/SCOPE: CPT | Performed by: OBSTETRICS & GYNECOLOGY

## 2023-05-09 PROCEDURE — 99213 OFFICE O/P EST LOW 20 MIN: CPT | Mod: 25,TH,, | Performed by: OBSTETRICS & GYNECOLOGY

## 2023-05-09 PROCEDURE — 59025 FETAL NON-STRESS TEST: CPT | Mod: 59

## 2023-05-09 PROCEDURE — 87088 URINE BACTERIA CULTURE: CPT | Performed by: OBSTETRICS & GYNECOLOGY

## 2023-05-09 PROCEDURE — 99213 PR OFFICE/OUTPT VISIT, EST, LEVL III, 20-29 MIN: ICD-10-PCS | Mod: 25,TH,, | Performed by: OBSTETRICS & GYNECOLOGY

## 2023-05-09 PROCEDURE — 99211 OFF/OP EST MAY X REQ PHY/QHP: CPT | Mod: 25

## 2023-05-09 PROCEDURE — 59025 FETAL NON-STRESS TEST: CPT | Mod: 26,,, | Performed by: OBSTETRICS & GYNECOLOGY

## 2023-05-09 PROCEDURE — 63600175 PHARM REV CODE 636 W HCPCS: Performed by: OBSTETRICS & GYNECOLOGY

## 2023-05-09 RX ORDER — PROCHLORPERAZINE EDISYLATE 5 MG/ML
5 INJECTION INTRAMUSCULAR; INTRAVENOUS EVERY 6 HOURS PRN
Status: DISCONTINUED | OUTPATIENT
Start: 2023-05-09 | End: 2023-05-09 | Stop reason: HOSPADM

## 2023-05-09 RX ORDER — ACETAMINOPHEN 500 MG
500 TABLET ORAL EVERY 6 HOURS PRN
Status: DISCONTINUED | OUTPATIENT
Start: 2023-05-09 | End: 2023-05-09 | Stop reason: HOSPADM

## 2023-05-09 RX ORDER — SODIUM CHLORIDE, SODIUM LACTATE, POTASSIUM CHLORIDE, CALCIUM CHLORIDE 600; 310; 30; 20 MG/100ML; MG/100ML; MG/100ML; MG/100ML
INJECTION, SOLUTION INTRAVENOUS CONTINUOUS
Status: DISCONTINUED | OUTPATIENT
Start: 2023-05-09 | End: 2023-05-09 | Stop reason: HOSPADM

## 2023-05-09 RX ORDER — ONDANSETRON 4 MG/1
8 TABLET, ORALLY DISINTEGRATING ORAL EVERY 8 HOURS PRN
Status: DISCONTINUED | OUTPATIENT
Start: 2023-05-09 | End: 2023-05-09 | Stop reason: HOSPADM

## 2023-05-09 RX ADMIN — SODIUM CHLORIDE, POTASSIUM CHLORIDE, SODIUM LACTATE AND CALCIUM CHLORIDE: 600; 310; 30; 20 INJECTION, SOLUTION INTRAVENOUS at 02:05

## 2023-05-09 RX ADMIN — SODIUM CHLORIDE, POTASSIUM CHLORIDE, SODIUM LACTATE AND CALCIUM CHLORIDE: 600; 310; 30; 20 INJECTION, SOLUTION INTRAVENOUS at 01:05

## 2023-05-09 NOTE — DISCHARGE SUMMARY
[facility]  Obstetrics  Discharge Summary      Patient Name: Jamia Smith  MRN: [id number]  Admission Date: 2023  Hospital Length of Stay: 0 days  Discharge Date and Time:  2023 5:58 PM  Attending Physician: Ezra Hood MD   Discharging Provider: EZRA HOOD MD   Primary Care Provider: DEREJE SOARES    HPI: 27 y.o. female  at 38w4d presented complaining of abdominal pain x 1 day.       FHT: 130 Cat 1 (reassuring), 4 hours of fetal monitoring  TOCO: Q 6-10 minutes        Hospital Course:   She had a FHR decel soon after arrival.  UA + ketones. She was given IV hydration and fetal monitoring has been reactive, cat 1 since then. She has had > 4 hours of reactive fetal testing without FHR decel at this time. BPP: .  МАРИНА: 14cm . She has an appt tomorrow AM in my clinic. Reports normal fetal movement.      Vitals reviewed, nml  Gen :NAD  Abd: gravid, mildly tender  Ext: no CCE  Cvx: /Hi      Final Active Diagnoses:    Diagnosis Date Noted POA    PRINCIPAL PROBLEM:  Abdominal pain affecting pregnancy, antepartum [O26.899, R10.9] 2023 Yes      Problems Resolved During this Admission:          Immunizations     None          This patient has no babies on file.  Pending Diagnostic Studies:     Procedure Component Value Units Date/Time    Drug Screen, Urine [[id number]]     Order Status: Sent Lab Status: No result     Specimen: Urine           Discharged Condition: good    Disposition: Home or Self Care    Follow Up:   Follow-up Information     EZRA HOOD MD Follow up in 1 day(s).    Specialty: Obstetrics and Gynecology  Contact information:  [address] [place] [id number] [phone]                       Patient Instructions:   No discharge procedures on file.  Medications:  Current Discharge Medication List      CONTINUE these medications which have NOT CHANGED    Details   albuterol (PROVENTIL) 2.5 mg /3 mL (0.083 %) nebulizer solution Take 3 mLs  (2.5 mg total) by nebulization every 6 (six) hours as needed for Wheezing or Shortness of Breath. Rescue  Qty: 90 mL, Refills: 0      albuterol (VENTOLIN HFA) 90 mcg/actuation inhaler Inhale 2 puffs into the lungs every 6 (six) hours as needed for Wheezing or Shortness of Breath. Rescue  Qty: 18 g, Refills: 11      azithromycin (Z-FLORESITA) 250 MG tablet Take 2 tablets by mouth on day 1; Take 1 tablet by mouth on days 2-5  Qty: 6 tablet, Refills: 0      nebulizer accessories Kit by Misc.(Non-Drug; Combo Route) route.      nitrofurantoin, macrocrystal-monohydrate, (MACROBID) 100 MG capsule Take 1 capsule by mouth twice daily for 7 days  Qty: 14 capsule, Refills: 0    Associated Diagnoses: Urinary tract infection in mother during second trimester of pregnancy      ondansetron (ZOFRAN) 4 MG tablet Take 2 tablets (8 mg total) by mouth every 6 (six) hours as needed for Nausea.  Qty: 10 tablet, Refills: 0      predniSONE (DELTASONE) 20 MG tablet Take 1 tablet (20 mg total) by mouth once daily.  Qty: 5 tablet, Refills: 0      prenatal vit/iron fum/folic ac (PRENATAL 1+1 ORAL) Take by mouth.      sertraline (ZOLOFT) 25 MG tablet Take 1 tablet (25 mg total) by mouth once daily.  Qty: 30 tablet, Refills: 6    Associated Diagnoses: Depression complicating pregnancy in third trimester, antepartum         Kick counts three times daily  Return to [facility] if she has decrease fetal movement, increased pain, leakage of fluid, persistent headache, scotoma, etc.  Keep OB appt tomorrow AM    EZRA NUGENT MD  Obstetrics  [facility]

## 2023-05-09 NOTE — HOSPITAL COURSE
She had a FHR decel soon after arrival.  UA + ketones. She was given IV hydration and fetal monitoring has been reactive, cat 1 since then. She has had > 4 hours of reactive fetal testing without FHR decel at this time. BPP: 8/8.  МАРИНА: 14cm . She has an appt tomorrow AM in my clinic. Reports normal fetal movement.

## 2023-05-09 NOTE — NURSING
PT PRESENTED TO UNIT WITH ABDOMNIAL PAIN, PT PLACED ON EFM AND TOCO. UA OBTAINED AND BPP. IV STARTED TO LEFT HAND AND 2 LITERS OF LR GIVEN. BPP 8/8 МАРИНА 14, ANTERIOR PLACENTA, VERTEX. DR NUGENT ROUNDED ON PT AT 1800, DISCUSSED SIGNS OF LABOR, FETAL KICK COUNTS, PT VU OF EDUCATION. PT TO COME BACK TO UNIT Thursday AT MIDNIGHT FOR INDUCTION.

## 2023-05-10 ENCOUNTER — ROUTINE PRENATAL (OUTPATIENT)
Dept: OBSTETRICS AND GYNECOLOGY | Facility: CLINIC | Age: 28
End: 2023-05-10
Payer: MEDICAID

## 2023-05-10 VITALS
BODY MASS INDEX: 25.18 KG/M2 | DIASTOLIC BLOOD PRESSURE: 72 MMHG | SYSTOLIC BLOOD PRESSURE: 122 MMHG | WEIGHT: 147.5 LBS | TEMPERATURE: 98 F | HEIGHT: 64 IN

## 2023-05-10 DIAGNOSIS — J45.909 ASTHMA DURING PREGNANCY: ICD-10-CM

## 2023-05-10 DIAGNOSIS — O99.343 DEPRESSION COMPLICATING PREGNANCY IN THIRD TRIMESTER, ANTEPARTUM: ICD-10-CM

## 2023-05-10 DIAGNOSIS — R10.9 ABDOMINAL PAIN AFFECTING PREGNANCY, ANTEPARTUM: ICD-10-CM

## 2023-05-10 DIAGNOSIS — O99.322 MATERNAL DRUG USE COMPLICATING PREGNANCY IN SECOND TRIMESTER, ANTEPARTUM: Primary | ICD-10-CM

## 2023-05-10 DIAGNOSIS — O99.340 ANXIETY DURING PREGNANCY: ICD-10-CM

## 2023-05-10 DIAGNOSIS — F32.A DEPRESSION COMPLICATING PREGNANCY IN THIRD TRIMESTER, ANTEPARTUM: ICD-10-CM

## 2023-05-10 DIAGNOSIS — O26.899 ABDOMINAL PAIN AFFECTING PREGNANCY, ANTEPARTUM: ICD-10-CM

## 2023-05-10 DIAGNOSIS — F41.9 ANXIETY DURING PREGNANCY: ICD-10-CM

## 2023-05-10 DIAGNOSIS — O99.519 ASTHMA DURING PREGNANCY: ICD-10-CM

## 2023-05-10 DIAGNOSIS — Z3A.38 38 WEEKS GESTATION OF PREGNANCY: ICD-10-CM

## 2023-05-10 PROCEDURE — 59025 FETAL NON-STRESS TEST: CPT | Mod: 26,,, | Performed by: OBSTETRICS & GYNECOLOGY

## 2023-05-10 PROCEDURE — 99213 PR OFFICE/OUTPT VISIT, EST, LEVL III, 20-29 MIN: ICD-10-PCS | Mod: 25,TH,, | Performed by: OBSTETRICS & GYNECOLOGY

## 2023-05-10 PROCEDURE — 99213 OFFICE O/P EST LOW 20 MIN: CPT | Mod: 25,TH,, | Performed by: OBSTETRICS & GYNECOLOGY

## 2023-05-10 PROCEDURE — 59025 PR FETAL 2N-STRESS TEST: ICD-10-PCS | Mod: 26,,, | Performed by: OBSTETRICS & GYNECOLOGY

## 2023-05-11 LAB — BACTERIA UR CULT: NORMAL

## 2023-05-12 ENCOUNTER — ANESTHESIA (OUTPATIENT)
Dept: OBSTETRICS AND GYNECOLOGY | Facility: HOSPITAL | Age: 28
End: 2023-05-12
Payer: MEDICAID

## 2023-05-12 ENCOUNTER — HOSPITAL ENCOUNTER (INPATIENT)
Facility: HOSPITAL | Age: 28
LOS: 3 days | Discharge: HOME | End: 2023-05-15
Attending: OBSTETRICS & GYNECOLOGY | Admitting: OBSTETRICS & GYNECOLOGY
Payer: MEDICAID

## 2023-05-12 ENCOUNTER — ANESTHESIA EVENT (OUTPATIENT)
Dept: OBSTETRICS AND GYNECOLOGY | Facility: HOSPITAL | Age: 28
End: 2023-05-12
Payer: MEDICAID

## 2023-05-12 DIAGNOSIS — Z3A.39 39 WEEKS GESTATION OF PREGNANCY: ICD-10-CM

## 2023-05-12 DIAGNOSIS — O26.899 ABDOMINAL PAIN AFFECTING PREGNANCY, ANTEPARTUM: ICD-10-CM

## 2023-05-12 DIAGNOSIS — R10.9 ABDOMINAL PAIN AFFECTING PREGNANCY, ANTEPARTUM: ICD-10-CM

## 2023-05-12 DIAGNOSIS — O99.519 ASTHMA DURING PREGNANCY: ICD-10-CM

## 2023-05-12 DIAGNOSIS — F41.9 ANXIETY: ICD-10-CM

## 2023-05-12 DIAGNOSIS — N89.8 VAGINAL DISCHARGE DURING PREGNANCY IN SECOND TRIMESTER: ICD-10-CM

## 2023-05-12 DIAGNOSIS — F32.A DEPRESSION COMPLICATING PREGNANCY IN THIRD TRIMESTER, ANTEPARTUM: ICD-10-CM

## 2023-05-12 DIAGNOSIS — J45.901 ASTHMA WITH ACUTE EXACERBATION, UNSPECIFIED ASTHMA SEVERITY, UNSPECIFIED WHETHER PERSISTENT: ICD-10-CM

## 2023-05-12 DIAGNOSIS — J45.909 ASTHMA DURING PREGNANCY: ICD-10-CM

## 2023-05-12 DIAGNOSIS — F41.9 ANXIETY DURING PREGNANCY: ICD-10-CM

## 2023-05-12 DIAGNOSIS — E55.9 VITAMIN D DEFICIENCY: ICD-10-CM

## 2023-05-12 DIAGNOSIS — Z98.891 STATUS POST CESAREAN SECTION: Primary | ICD-10-CM

## 2023-05-12 DIAGNOSIS — Z86.19 HISTORY OF HELICOBACTER PYLORI INFECTION: ICD-10-CM

## 2023-05-12 DIAGNOSIS — O99.820 GBS (GROUP B STREPTOCOCCUS CARRIER), +RV CULTURE, CURRENTLY PREGNANT: ICD-10-CM

## 2023-05-12 DIAGNOSIS — O26.892 VAGINAL DISCHARGE DURING PREGNANCY IN SECOND TRIMESTER: ICD-10-CM

## 2023-05-12 DIAGNOSIS — F12.90 MARIJUANA USER: ICD-10-CM

## 2023-05-12 DIAGNOSIS — O99.340 ANXIETY DURING PREGNANCY: ICD-10-CM

## 2023-05-12 DIAGNOSIS — O99.322 MATERNAL DRUG USE COMPLICATING PREGNANCY IN SECOND TRIMESTER, ANTEPARTUM: ICD-10-CM

## 2023-05-12 DIAGNOSIS — O23.42 URINARY TRACT INFECTION IN MOTHER DURING SECOND TRIMESTER OF PREGNANCY: ICD-10-CM

## 2023-05-12 DIAGNOSIS — O99.343 DEPRESSION COMPLICATING PREGNANCY IN THIRD TRIMESTER, ANTEPARTUM: ICD-10-CM

## 2023-05-12 LAB
ABO AND RH: NORMAL
ABORH RETYPE: NORMAL
AMPHET UR QL SCN: NEGATIVE
AMPHET UR QL SCN: NEGATIVE
ANTIBODY SCREEN: NORMAL
BARBITURATE SCN PRESENT UR: NEGATIVE
BARBITURATE SCN PRESENT UR: NORMAL
BENZODIAZ UR QL SCN: NEGATIVE
BENZODIAZ UR QL SCN: NEGATIVE
CANNABINOIDS UR QL SCN: NEGATIVE
CANNABINOIDS UR QL SCN: NEGATIVE
COCAINE UR QL SCN: NEGATIVE
COCAINE UR QL SCN: NEGATIVE
FENTANYL UR QL SCN: NEGATIVE
MDMA UR QL SCN: NEGATIVE
METHADONE UR QL SCN: NEGATIVE
OPIATES UR QL SCN: NEGATIVE
OPIATES UR QL SCN: NEGATIVE
PCP UR QL: NEGATIVE
PCP UR QL: NEGATIVE
PH UR: 6.5 [PH] (ref 3–11)
PH UR: 7 [PH] (ref 3–11)
SPECIMEN OUTDATE: NORMAL

## 2023-05-12 PROCEDURE — 59025 FETAL NON-STRESS TEST: CPT

## 2023-05-12 PROCEDURE — 62326 NJX INTERLAMINAR LMBR/SAC: CPT | Performed by: NURSE ANESTHETIST, CERTIFIED REGISTERED

## 2023-05-12 PROCEDURE — 80307 DRUG TEST PRSMV CHEM ANLYZR: CPT | Performed by: OBSTETRICS & GYNECOLOGY

## 2023-05-12 PROCEDURE — 01968 PR INSERT CATH,ART,PERCUT,SHORTTERM: ICD-10-PCS | Mod: QZ,,, | Performed by: NURSE ANESTHETIST, CERTIFIED REGISTERED

## 2023-05-12 PROCEDURE — 63600175 PHARM REV CODE 636 W HCPCS: Performed by: NURSE ANESTHETIST, CERTIFIED REGISTERED

## 2023-05-12 PROCEDURE — 72100002 HC LABOR CARE, 1ST 8 HOURS

## 2023-05-12 PROCEDURE — 36415 COLL VENOUS BLD VENIPUNCTURE: CPT | Performed by: OBSTETRICS & GYNECOLOGY

## 2023-05-12 PROCEDURE — 11000001 HC ACUTE MED/SURG PRIVATE ROOM

## 2023-05-12 PROCEDURE — 25000003 PHARM REV CODE 250: Performed by: OBSTETRICS & GYNECOLOGY

## 2023-05-12 PROCEDURE — 63600175 PHARM REV CODE 636 W HCPCS: Performed by: OBSTETRICS & GYNECOLOGY

## 2023-05-12 PROCEDURE — 86900 BLOOD TYPING SEROLOGIC ABO: CPT | Performed by: OBSTETRICS & GYNECOLOGY

## 2023-05-12 PROCEDURE — 59514 PRA REAN DELIVERY ONLY: ICD-10-PCS | Mod: QZ,,, | Performed by: NURSE ANESTHETIST, CERTIFIED REGISTERED

## 2023-05-12 PROCEDURE — 59514 CESAREAN DELIVERY ONLY: CPT | Mod: QZ,,, | Performed by: NURSE ANESTHETIST, CERTIFIED REGISTERED

## 2023-05-12 PROCEDURE — 01968 ANES/ANALG CS DLVR NEURAXIAL: CPT | Mod: QZ,,, | Performed by: NURSE ANESTHETIST, CERTIFIED REGISTERED

## 2023-05-12 RX ORDER — BUPIVACAINE HYDROCHLORIDE 2.5 MG/ML
INJECTION, SOLUTION EPIDURAL; INFILTRATION; INTRACAUDAL
Status: DISPENSED
Start: 2023-05-12 | End: 2023-05-13

## 2023-05-12 RX ORDER — LIDOCAINE HYDROCHLORIDE 10 MG/ML
10 INJECTION INFILTRATION; PERINEURAL ONCE AS NEEDED
Status: DISCONTINUED | OUTPATIENT
Start: 2023-05-12 | End: 2023-05-14

## 2023-05-12 RX ORDER — OXYTOCIN/RINGER'S LACTATE 30/500 ML
95 PLASTIC BAG, INJECTION (ML) INTRAVENOUS ONCE AS NEEDED
Status: COMPLETED | OUTPATIENT
Start: 2023-05-12 | End: 2023-05-13

## 2023-05-12 RX ORDER — LIDOCAINE HCL/EPINEPHRINE/PF 2%-1:200K
VIAL (ML) INJECTION
Status: COMPLETED
Start: 2023-05-12 | End: 2023-05-12

## 2023-05-12 RX ORDER — SODIUM CHLORIDE 9 MG/ML
INJECTION, SOLUTION INTRAVENOUS
Status: DISCONTINUED | OUTPATIENT
Start: 2023-05-12 | End: 2023-05-14

## 2023-05-12 RX ORDER — OXYTOCIN/RINGER'S LACTATE 30/500 ML
0-30 PLASTIC BAG, INJECTION (ML) INTRAVENOUS CONTINUOUS
Status: DISCONTINUED | OUTPATIENT
Start: 2023-05-12 | End: 2023-05-14

## 2023-05-12 RX ORDER — ONDANSETRON 2 MG/ML
4 INJECTION INTRAMUSCULAR; INTRAVENOUS EVERY 6 HOURS PRN
Status: DISCONTINUED | OUTPATIENT
Start: 2023-05-12 | End: 2023-05-14

## 2023-05-12 RX ORDER — HYDROXYZINE PAMOATE 25 MG/1
25 CAPSULE ORAL EVERY 8 HOURS PRN
Status: DISCONTINUED | OUTPATIENT
Start: 2023-05-12 | End: 2023-05-15 | Stop reason: HOSPADM

## 2023-05-12 RX ORDER — TERBUTALINE SULFATE 1 MG/ML
0.25 INJECTION SUBCUTANEOUS
Status: DISCONTINUED | OUTPATIENT
Start: 2023-05-12 | End: 2023-05-14

## 2023-05-12 RX ORDER — BUPIVACAINE HYDROCHLORIDE 2.5 MG/ML
INJECTION, SOLUTION INFILTRATION; PERINEURAL
Status: DISCONTINUED | OUTPATIENT
Start: 2023-05-12 | End: 2023-05-13

## 2023-05-12 RX ORDER — CARBOPROST TROMETHAMINE 250 UG/ML
250 INJECTION, SOLUTION INTRAMUSCULAR
Status: DISCONTINUED | OUTPATIENT
Start: 2023-05-12 | End: 2023-05-14

## 2023-05-12 RX ORDER — SIMETHICONE 80 MG
1 TABLET,CHEWABLE ORAL 4 TIMES DAILY PRN
Status: DISCONTINUED | OUTPATIENT
Start: 2023-05-12 | End: 2023-05-13

## 2023-05-12 RX ORDER — TRANEXAMIC ACID 10 MG/ML
1000 INJECTION, SOLUTION INTRAVENOUS ONCE AS NEEDED
Status: DISCONTINUED | OUTPATIENT
Start: 2023-05-12 | End: 2023-05-14

## 2023-05-12 RX ORDER — BUTORPHANOL TARTRATE 1 MG/ML
1 INJECTION INTRAMUSCULAR; INTRAVENOUS
Status: DISCONTINUED | OUTPATIENT
Start: 2023-05-12 | End: 2023-05-14

## 2023-05-12 RX ORDER — MISOPROSTOL 100 UG/1
1000 TABLET ORAL ONCE AS NEEDED
Status: DISCONTINUED | OUTPATIENT
Start: 2023-05-12 | End: 2023-05-14

## 2023-05-12 RX ORDER — METHYLERGONOVINE MALEATE 0.2 MG/ML
200 INJECTION INTRAVENOUS
Status: DISCONTINUED | OUTPATIENT
Start: 2023-05-12 | End: 2023-05-14

## 2023-05-12 RX ORDER — TRANEXAMIC ACID 10 MG/ML
1000 INJECTION, SOLUTION INTRAVENOUS ONCE AS NEEDED
Status: DISCONTINUED | OUTPATIENT
Start: 2023-05-12 | End: 2023-05-12

## 2023-05-12 RX ORDER — OXYTOCIN 10 [USP'U]/ML
10 INJECTION, SOLUTION INTRAMUSCULAR; INTRAVENOUS ONCE AS NEEDED
Status: DISCONTINUED | OUTPATIENT
Start: 2023-05-12 | End: 2023-05-14

## 2023-05-12 RX ORDER — ACETAMINOPHEN 325 MG/1
650 TABLET ORAL EVERY 6 HOURS PRN
Status: DISCONTINUED | OUTPATIENT
Start: 2023-05-12 | End: 2023-05-14

## 2023-05-12 RX ORDER — ONDANSETRON 4 MG/1
4 TABLET, ORALLY DISINTEGRATING ORAL EVERY 8 HOURS PRN
Status: DISCONTINUED | OUTPATIENT
Start: 2023-05-12 | End: 2023-05-14

## 2023-05-12 RX ORDER — ROPIVACAINE HYDROCHLORIDE 2 MG/ML
INJECTION, SOLUTION EPIDURAL; INFILTRATION
Status: COMPLETED
Start: 2023-05-12 | End: 2023-05-12

## 2023-05-12 RX ORDER — ROPIVACAINE HYDROCHLORIDE 2 MG/ML
INJECTION, SOLUTION EPIDURAL; INFILTRATION
Status: DISCONTINUED | OUTPATIENT
Start: 2023-05-12 | End: 2023-05-12

## 2023-05-12 RX ORDER — SODIUM CHLORIDE, SODIUM LACTATE, POTASSIUM CHLORIDE, CALCIUM CHLORIDE 600; 310; 30; 20 MG/100ML; MG/100ML; MG/100ML; MG/100ML
INJECTION, SOLUTION INTRAVENOUS CONTINUOUS
Status: DISCONTINUED | OUTPATIENT
Start: 2023-05-12 | End: 2023-05-14

## 2023-05-12 RX ORDER — OXYTOCIN/RINGER'S LACTATE 30/500 ML
334 PLASTIC BAG, INJECTION (ML) INTRAVENOUS ONCE AS NEEDED
Status: DISCONTINUED | OUTPATIENT
Start: 2023-05-12 | End: 2023-05-14

## 2023-05-12 RX ORDER — LIDOCAINE HCL/EPINEPHRINE/PF 2%-1:200K
VIAL (ML) INJECTION
Status: DISCONTINUED | OUTPATIENT
Start: 2023-05-12 | End: 2023-05-13

## 2023-05-12 RX ORDER — MISOPROSTOL 100 MCG
25 TABLET ORAL
Status: DISPENSED | OUTPATIENT
Start: 2023-05-12 | End: 2023-05-12

## 2023-05-12 RX ORDER — ROPIVACAINE HYDROCHLORIDE 2 MG/ML
INJECTION, SOLUTION EPIDURAL; INFILTRATION CONTINUOUS PRN
Status: DISCONTINUED | OUTPATIENT
Start: 2023-05-12 | End: 2023-05-13

## 2023-05-12 RX ORDER — CALCIUM CARBONATE 200(500)MG
500 TABLET,CHEWABLE ORAL 3 TIMES DAILY PRN
Status: DISCONTINUED | OUTPATIENT
Start: 2023-05-12 | End: 2023-05-14

## 2023-05-12 RX ORDER — OXYTOCIN/RINGER'S LACTATE 30/500 ML
334 PLASTIC BAG, INJECTION (ML) INTRAVENOUS ONCE
Status: DISCONTINUED | OUTPATIENT
Start: 2023-05-12 | End: 2023-05-14

## 2023-05-12 RX ORDER — LIDOCAINE HYDROCHLORIDE AND EPINEPHRINE 15; 5 MG/ML; UG/ML
INJECTION, SOLUTION EPIDURAL
Status: DISCONTINUED | OUTPATIENT
Start: 2023-05-12 | End: 2023-05-13

## 2023-05-12 RX ORDER — OXYTOCIN/RINGER'S LACTATE 30/500 ML
95 PLASTIC BAG, INJECTION (ML) INTRAVENOUS ONCE
Status: DISCONTINUED | OUTPATIENT
Start: 2023-05-12 | End: 2023-05-14

## 2023-05-12 RX ADMIN — DEXTROSE MONOHYDRATE 2.5 MILLION UNITS: 50 INJECTION, SOLUTION INTRAVENOUS at 08:05

## 2023-05-12 RX ADMIN — HYDROXYZINE PAMOATE 25 MG: 25 CAPSULE ORAL at 08:05

## 2023-05-12 RX ADMIN — BUPIVACAINE HYDROCHLORIDE 3 ML: 2.5 INJECTION, SOLUTION INFILTRATION; PERINEURAL at 10:05

## 2023-05-12 RX ADMIN — DEXTROSE MONOHYDRATE 2.5 MILLION UNITS: 50 INJECTION, SOLUTION INTRAVENOUS at 04:05

## 2023-05-12 RX ADMIN — LIDOCAINE HYDROCHLORIDE AND EPINEPHRINE 2 ML: 15; 5 INJECTION, SOLUTION EPIDURAL at 03:05

## 2023-05-12 RX ADMIN — SODIUM CHLORIDE, POTASSIUM CHLORIDE, SODIUM LACTATE AND CALCIUM CHLORIDE 1000 ML: 600; 310; 30; 20 INJECTION, SOLUTION INTRAVENOUS at 11:05

## 2023-05-12 RX ADMIN — LIDOCAINE HYDROCHLORIDE AND EPINEPHRINE 3 ML: 15; 5 INJECTION, SOLUTION EPIDURAL at 03:05

## 2023-05-12 RX ADMIN — BUTORPHANOL TARTRATE 0.5 MG: 1 INJECTION, SOLUTION INTRAMUSCULAR; INTRAVENOUS at 01:05

## 2023-05-12 RX ADMIN — DEXTROSE MONOHYDRATE 5 MILLION UNITS: 5 INJECTION INTRAVENOUS at 12:05

## 2023-05-12 RX ADMIN — MISOPROSTOL 25 MCG: 100 TABLET ORAL at 08:05

## 2023-05-12 RX ADMIN — SODIUM CHLORIDE, POTASSIUM CHLORIDE, SODIUM LACTATE AND CALCIUM CHLORIDE: 600; 310; 30; 20 INJECTION, SOLUTION INTRAVENOUS at 12:05

## 2023-05-12 RX ADMIN — MISOPROSTOL 25 MCG: 100 TABLET ORAL at 01:05

## 2023-05-12 RX ADMIN — Medication 4 MILLI-UNITS/MIN: at 06:05

## 2023-05-12 RX ADMIN — LIDOCAINE HYDROCHLORIDE,EPINEPHRINE BITARTRATE 3 MG: 20; .005 INJECTION, SOLUTION EPIDURAL; INFILTRATION; INTRACAUDAL; PERINEURAL at 03:05

## 2023-05-12 RX ADMIN — ROPIVACAINE HYDROCHLORIDE 10 ML/HR: 2 INJECTION, SOLUTION EPIDURAL; INFILTRATION at 03:05

## 2023-05-12 RX ADMIN — MISOPROSTOL 25 MCG: 100 TABLET ORAL at 04:05

## 2023-05-12 RX ADMIN — DEXTROSE MONOHYDRATE 2.5 MILLION UNITS: 50 INJECTION, SOLUTION INTRAVENOUS at 12:05

## 2023-05-12 NOTE — NURSING
"After receiving permission from pt to discuss all care in front of other individuals present in room at this time, spoke with pt directly with her mother and significant other at the bedside about unwanted visitor "Elmer Garibay". Pt did not disclose what her relationship with this individual was, only that she did not want him as a visitor under any circumstances. Pt states "I think he lives in [place] and he doesn't know my due date so I highly doubt he will show up, but just in case...". Private pt status discussed with pt in depth, pt states that she does not want to be a private pt stating that she "wants the rest of my family to be able to come up here". Pt advised on ways to prevent information being spread of her being admitted to hospital, pt verbalizes understanding and maintains decision to not be a private admission. Pt advised to notify staff at any time if she feels scared or unsafe, pt verbalizes understanding.     0100 Fleets Enema and Urine Drug screen discussed, pt states her last bowel movement was at 2200 this past evening therefor she does not feel the need to receive an enema. Pt does verbally consent to provide urine sample for urine drug screen. Verbal consent obtained by HILARY Jo with HILARY Kuhn at bedside as a witness. Pt provided with urine collection cup. Urine sample sent to lab after collection.  "

## 2023-05-12 NOTE — H&P
[facility]  Obstetrics  History & Physical    Patient Name: Jamia Smith  MRN: [id number]  Admission Date: 2023  Primary Care Provider: DEREJE SOARES    Subjective:     Principal Problem:GBS (group B Streptococcus carrier), +RV culture, currently pregnant    History of Present Illness:  27 y.o. female  at 39w0d presented for induction per maternal request.  She received cytotec overnight.  Membranes were ruptured artificially around 1400 today. She has received epidural anesthesia. She is GBS positive and receiving PCN G for GBS prophylaxis.         Obstetric HPI:  Patient reports Date/time of onset: 23, Frequency: Every 2-3 minutes, Duration: 60 seconds, and Intensity: strong contractions, active fetal movement, No vaginal bleeding , No loss of fluid       OB History    Para Term  AB Living   1 0 0 0 0 0   SAB IAB Ectopic Multiple Live Births   0 0 0 0 0      # Outcome Date GA Lbr Meir/2nd Weight Sex Delivery Anes PTL Lv   1 Current              Past Medical History:   Diagnosis Date    Anxiety disorder, unspecified     Asthma     H. pylori infection     Vitamin D deficiency      Past Surgical History:   Procedure Laterality Date    CHOLECYSTECTOMY         PTA Medications   Medication Sig    albuterol (PROVENTIL) 2.5 mg /3 mL (0.083 %) nebulizer solution Take 3 mLs (2.5 mg total) by nebulization every 6 (six) hours as needed for Wheezing or Shortness of Breath. Rescue (Patient not taking: Reported on 5/10/2023)    albuterol (VENTOLIN HFA) 90 mcg/actuation inhaler Inhale 2 puffs into the lungs every 6 (six) hours as needed for Wheezing or Shortness of Breath. Rescue (Patient not taking: Reported on 5/10/2023)    azithromycin (Z-FLORESITA) 250 MG tablet Take 2 tablets by mouth on day 1; Take 1 tablet by mouth on days 2-5 (Patient not taking: Reported on 5/10/2023)    nebulizer accessories Kit by Misc.(Non-Drug; Combo Route) route.    nitrofurantoin,  "macrocrystal-monohydrate, (MACROBID) 100 MG capsule Take 1 capsule by mouth twice daily for 7 days (Patient not taking: Reported on 5/10/2023)    ondansetron (ZOFRAN) 4 MG tablet Take 2 tablets (8 mg total) by mouth every 6 (six) hours as needed for Nausea. (Patient not taking: Reported on 5/10/2023)    predniSONE (DELTASONE) 20 MG tablet Take 1 tablet (20 mg total) by mouth once daily. (Patient not taking: Reported on 5/10/2023)    prenatal vit/iron fum/folic ac (PRENATAL 1+1 ORAL) Take by mouth.    sertraline (ZOLOFT) 25 MG tablet Take 1 tablet (25 mg total) by mouth once daily. (Patient not taking: Reported on 5/10/2023)       Review of patient's allergies indicates:   Allergen Reactions    Iodinated contrast media Shortness Of Breath    Iodine Rash and Swelling    Shrimp         Family History       Problem Relation (Age of Onset)    No Known Problems Paternal Grandfather, Paternal Grandmother, Maternal Grandmother, Maternal Grandfather, Father, Mother, Brother, Sister          Tobacco Use    Smoking status: Every Day     Packs/day: 0.50     Types: Cigarettes    Smokeless tobacco: Never    Tobacco comments:     3/21/23 per pt "I smoke  only 1-3 cigarettes a day."   Substance and Sexual Activity    Alcohol use: Not Currently    Drug use: Not Currently     Types: Marijuana    Sexual activity: Yes     Partners: Male     Review of Systems   Constitutional:  Negative for chills and fever.   Eyes:  Negative for visual disturbance.   Respiratory:  Negative for cough, shortness of breath and wheezing.    Cardiovascular:  Negative for chest pain, palpitations and leg swelling.   Gastrointestinal:  Positive for abdominal pain. Negative for constipation and diarrhea.   Genitourinary:  Negative for dysuria, flank pain, genital sores, pelvic pain, urgency, vaginal bleeding, vaginal discharge, vaginal pain, postcoital bleeding and vaginal odor.   Musculoskeletal:  Positive for back pain. Negative for leg pain. "   Neurological:  Negative for seizures and headaches.   All other systems reviewed and are negative.   Objective:     Vital Signs (Most Recent):  Temp: 97.7 °F (36.5 °C) (05/12/23 0756)  Pulse: 86 (05/12/23 1700)  Resp: 16 (05/12/23 0756)  BP: 109/70 (05/12/23 1700)  SpO2: 99 % (05/12/23 0756) Vital Signs (24h Range):  Temp:  [97.3 °F (36.3 °C)-98.1 °F (36.7 °C)] 97.7 °F (36.5 °C)  Pulse:  [] 86  Resp:  [16-20] 16  SpO2:  [97 %-99 %] 99 %  BP: (107-131)/(56-77) 109/70     Weight: 66.2 kg (146 lb)  Body mass index is 25.06 kg/m².    FHT: 140Cat 1 (reassuring)  TOCO:  Q 2 minutes     Physical Exam:   Constitutional: She is oriented to person, place, and time. She appears well-developed and well-nourished. No distress.    HENT:   Head: Normocephalic and atraumatic.    Eyes: Pupils are equal, round, and reactive to light. EOM are normal.    Neck: No tracheal deviation present. No thyromegaly present.    Cardiovascular:       Exam reveals no clubbing, no cyanosis and no edema.        Pulmonary/Chest: Effort normal and breath sounds normal.        Abdominal: There is no abdominal tenderness. There is no rebound and no guarding.     Genitourinary:    Vagina and rectum normal.   The external female genitalia was normal.   No external genitalia lesions identified,Genitalia hair distrobution normal .   There is no lesion on the right labia. There is no lesion on the left labia. Cervix is normal. Vagina exhibits no lesion. No  no vaginal discharge, tenderness or bleeding in the vagina. Cervix exhibits no motion tenderness and no tenderness. Uterus size: 39 cm.          Musculoskeletal: Normal range of motion.       Neurological: She is alert and oriented to person, place, and time. She has normal reflexes.    Skin: Skin is warm and dry. No cyanosis. Nails show no clubbing.    Psychiatric: She has a normal mood and affect. Her behavior is normal. Thought content normal.      Cervix:  Dilation:  1  Effacement:   100%  Station: -3  Presentation: Vertex  AROM at 14:00     Significant Labs:  Lab Results   Component Value Date    GROUPTRH O POS 2022       I have personallly reviewed all pertinent lab results from the last 24 hours.  Recent Lab Results         23  0150   23  0105   23  0017        Phencyclidine Negative            Negative           ABO and RH   O POS   O POS       Amphetamine Screen, Ur Negative            Negative           Antibody Screen     NEG       Barbiturate Screen, Ur Negative            No Result  Comment: Result could not be determined due to potential interference or the result is above assay range.  Sending out for confirmation.           Benzodiazepine Screen, Urine Negative            Negative           Cannabinoids, Urine Negative            Negative           Cocaine (Metab.) Negative            Negative           Fentanyl, Urine Negative           MDMA, Urine Negative           Methadone, Urine Negative           Opiate Scrn, Ur Negative            Negative           pH, Urine 6.5            7.0           Specimen Outdate     05/15/2023 23:59             Assessment/Plan:     27 y.o. female  at 39w0d for elective induction    Active Hospital Problems    Diagnosis  POA    *GBS (group B Streptococcus carrier), +RV culture, currently pregnant [O99.820]  Not Applicable    Asthma during pregnancy [O99.519, J45.909]  Yes    Anxiety during pregnancy [O99.340, F41.9]  Yes    Maternal drug use complicating pregnancy in second trimester, antepartum [O99.322]  Yes    Marijuana user [F12.90]  Yes    Depression complicating pregnancy in third trimester, antepartum [O99.343, F32.A]  Yes      Resolved Hospital Problems   No resolved problems to display.     Admitted for induction  Pitocin per protocol  PCN G for GBS prophylaxis  IVF  CMFM  Cont Epidural  Monitor mood  Anticipate vaginal delivery      EZRA NUGENT MD  Obstetrics  [facility]

## 2023-05-12 NOTE — HPI
27 y.o. female  at 39w0d presented for induction per maternal request.  She received cytotec overnight.  Membranes were ruptured artificially around 1400 on 23. She has received epidural anesthesia.

## 2023-05-12 NOTE — ANESTHESIA PREPROCEDURE EVALUATION
05/12/2023  Jamia Smith is a 27 y.o., female.      Pre-op Assessment    I have reviewed the Patient Summary Reports.     I have reviewed the Nursing Notes. I have reviewed the NPO Status.   I have reviewed the Medications.     Review of Systems  Anesthesia Hx:  No problems with previous Anesthesia  Denies Family Hx of Anesthesia complications.   Denies Personal Hx of Anesthesia complications.   Social:  Smoker    Hematology/Oncology:  Hematology Normal   Oncology Normal     EENT/Dental:EENT/Dental Normal   Cardiovascular:  Cardiovascular Normal Exercise tolerance: good     Pulmonary:   Asthma mild    Renal/:  Renal/ Normal     Hepatic/GI:   GERD    Musculoskeletal:  Spine Disorders: cervical    Neurological:  Neurology Normal    Endocrine:  Endocrine Normal    Dermatological:  Skin Normal    Psych:   Psychiatric History anxiety depression          Physical Exam    Dental:  Intact  Poor dentition      Anesthesia Plan  Type of Anesthesia, risks & benefits discussed:    Anesthesia Type: Epidural  Intra-op Monitoring Plan: Standard ASA Monitors  Post Op Pain Control Plan: epidural analgesia  Informed Consent: Informed consent signed with the Patient and all parties understand the risks and agree with anesthesia plan.  All questions answered. Patient consented to blood products? Yes  ASA Score: 2  Day of Surgery Review of History & Physical: H&P Update referred to the surgeon/provider.I have interviewed and examined the patient. I have reviewed the patient's H&P dated: There are no significant changes.     Ready For Surgery From Anesthesia Perspective.     .

## 2023-05-12 NOTE — SUBJECTIVE & OBJECTIVE
Obstetric HPI:  Patient reports Date/time of onset: 23, Frequency: Every 2-3 minutes, Duration: 60 seconds, and Intensity: strong contractions, active fetal movement, No vaginal bleeding , No loss of fluid       OB History    Para Term  AB Living   1 0 0 0 0 0   SAB IAB Ectopic Multiple Live Births   0 0 0 0 0      # Outcome Date GA Lbr Meir/2nd Weight Sex Delivery Anes PTL Lv   1 Current              Past Medical History:   Diagnosis Date    Anxiety disorder, unspecified     Asthma     H. pylori infection     Vitamin D deficiency      Past Surgical History:   Procedure Laterality Date    CHOLECYSTECTOMY         PTA Medications   Medication Sig    albuterol (PROVENTIL) 2.5 mg /3 mL (0.083 %) nebulizer solution Take 3 mLs (2.5 mg total) by nebulization every 6 (six) hours as needed for Wheezing or Shortness of Breath. Rescue (Patient not taking: Reported on 5/10/2023)    albuterol (VENTOLIN HFA) 90 mcg/actuation inhaler Inhale 2 puffs into the lungs every 6 (six) hours as needed for Wheezing or Shortness of Breath. Rescue (Patient not taking: Reported on 5/10/2023)    azithromycin (Z-FLORESITA) 250 MG tablet Take 2 tablets by mouth on day 1; Take 1 tablet by mouth on days 2-5 (Patient not taking: Reported on 5/10/2023)    nebulizer accessories Kit by Misc.(Non-Drug; Combo Route) route.    nitrofurantoin, macrocrystal-monohydrate, (MACROBID) 100 MG capsule Take 1 capsule by mouth twice daily for 7 days (Patient not taking: Reported on 5/10/2023)    ondansetron (ZOFRAN) 4 MG tablet Take 2 tablets (8 mg total) by mouth every 6 (six) hours as needed for Nausea. (Patient not taking: Reported on 5/10/2023)    predniSONE (DELTASONE) 20 MG tablet Take 1 tablet (20 mg total) by mouth once daily. (Patient not taking: Reported on 5/10/2023)    prenatal vit/iron fum/folic ac (PRENATAL 1+1 ORAL) Take by mouth.    sertraline (ZOLOFT) 25 MG tablet Take 1 tablet (25 mg total) by mouth once daily. (Patient not taking:  "Reported on 5/10/2023)       Review of patient's allergies indicates:   Allergen Reactions    Iodinated contrast media Shortness Of Breath    Iodine Rash and Swelling    Shrimp         Family History       Problem Relation (Age of Onset)    No Known Problems Paternal Grandfather, Paternal Grandmother, Maternal Grandmother, Maternal Grandfather, Father, Mother, Brother, Sister          Tobacco Use    Smoking status: Every Day     Packs/day: 0.50     Types: Cigarettes    Smokeless tobacco: Never    Tobacco comments:     3/21/23 per pt "I smoke  only 1-3 cigarettes a day."   Substance and Sexual Activity    Alcohol use: Not Currently    Drug use: Not Currently     Types: Marijuana    Sexual activity: Yes     Partners: Male     Review of Systems   Constitutional:  Negative for chills and fever.   Eyes:  Negative for visual disturbance.   Respiratory:  Negative for cough, shortness of breath and wheezing.    Cardiovascular:  Negative for chest pain, palpitations and leg swelling.   Gastrointestinal:  Positive for abdominal pain. Negative for constipation and diarrhea.   Genitourinary:  Negative for dysuria, flank pain, genital sores, pelvic pain, urgency, vaginal bleeding, vaginal discharge, vaginal pain, postcoital bleeding and vaginal odor.   Musculoskeletal:  Positive for back pain. Negative for leg pain.   Neurological:  Negative for seizures and headaches.   All other systems reviewed and are negative.   Objective:     Vital Signs (Most Recent):  Temp: 97.7 °F (36.5 °C) (05/12/23 0756)  Pulse: 86 (05/12/23 1700)  Resp: 16 (05/12/23 0756)  BP: 109/70 (05/12/23 1700)  SpO2: 99 % (05/12/23 0756) Vital Signs (24h Range):  Temp:  [97.3 °F (36.3 °C)-98.1 °F (36.7 °C)] 97.7 °F (36.5 °C)  Pulse:  [] 86  Resp:  [16-20] 16  SpO2:  [97 %-99 %] 99 %  BP: (107-131)/(56-77) 109/70     Weight: 66.2 kg (146 lb)  Body mass index is 25.06 kg/m².    FHT: 140Cat 1 (reassuring)  TOCO:  Q 2 minutes     Physical Exam: "   Constitutional: She is oriented to person, place, and time. She appears well-developed and well-nourished. No distress.    HENT:   Head: Normocephalic and atraumatic.    Eyes: Pupils are equal, round, and reactive to light. EOM are normal.    Neck: No tracheal deviation present. No thyromegaly present.    Cardiovascular:       Exam reveals no clubbing, no cyanosis and no edema.        Pulmonary/Chest: Effort normal and breath sounds normal.        Abdominal: There is no abdominal tenderness. There is no rebound and no guarding.     Genitourinary:    Vagina and rectum normal.   The external female genitalia was normal.   No external genitalia lesions identified,Genitalia hair distrobution normal .   There is no lesion on the right labia. There is no lesion on the left labia. Cervix is normal. Vagina exhibits no lesion. No  no vaginal discharge, tenderness or bleeding in the vagina. Cervix exhibits no motion tenderness and no tenderness. Uterus size: 39 cm.          Musculoskeletal: Normal range of motion.       Neurological: She is alert and oriented to person, place, and time. She has normal reflexes.    Skin: Skin is warm and dry. No cyanosis. Nails show no clubbing.    Psychiatric: She has a normal mood and affect. Her behavior is normal. Thought content normal.      Cervix:  Dilation:  1  Effacement:  100%  Station: -3  Presentation: Vertex  AROM at 14:00     Significant Labs:  Lab Results   Component Value Date    GROUPTRH O POS 11/11/2022       I have personallly reviewed all pertinent lab results from the last 24 hours.  Recent Lab Results         05/12/23  0150   05/12/23  0105   05/12/23  0017        Phencyclidine Negative            Negative           ABO and RH   O POS   O POS       Amphetamine Screen, Ur Negative            Negative           Antibody Screen     NEG       Barbiturate Screen, Ur Negative            No Result  Comment: Result could not be determined due to potential interference or the  result is above assay range.  Sending out for confirmation.           Benzodiazepine Screen, Urine Negative            Negative           Cannabinoids, Urine Negative            Negative           Cocaine (Metab.) Negative            Negative           Fentanyl, Urine Negative           MDMA, Urine Negative           Methadone, Urine Negative           Opiate Scrn, Ur Negative            Negative           pH, Urine 6.5            7.0           Specimen Outdate     05/15/2023 23:59

## 2023-05-12 NOTE — ANESTHESIA PROCEDURE NOTES
Epidural    Patient location during procedure: OB   Reason for block: primary anesthetic   Reason for block: at surgeon's request, post-op pain management  Diagnosis: Active Labor   Start time: 5/12/2023 3:10 PM  Timeout: 5/12/2023 3:02 PM  End time: 5/12/2023 3:15 PM    Staffing  Performing Provider: Diego Pitts CRNA  Authorizing Provider: Diego Pitts CRNA        Preanesthetic Checklist  Completed: patient identified, IV checked, site marked, surgical consent, monitors and equipment checked, pre-op evaluation, timeout performed, anesthesia consent given, hand hygiene performed and patient being monitored  Preparation  Patient position: sitting  Prep: ChloraPrep  Reason for block: primary anesthetic   Epidural  Skin Anesthetic: lidocaine 1%  Administration type: single shot  Approach: midline  Interspace: L4-5    Block type: caudal.  Needle and Epidural Catheter  Needle type: Tuohy   Needle gauge: 18  Needle length: 5.0 inches  Catheter type: multi-orifice  Catheter size: 20 G  Insertion Attempts: 1  Test dose: 3 mL of lidocaine 1.5% with Epi 1-to-200,000  Additional Documentation: incremental injection, negative aspiration for heme and CSF and no paresthesia on injection  Needle localization: anatomical landmarks  Assessment  Ease of block: easy  Patient's tolerance of the procedure: comfortable throughout block No inadvertent dural puncture with Tuohy.  Dural puncture not performed with spinal needle

## 2023-05-13 LAB
ALBUMIN SERPL-MCNC: 2.9 G/DL (ref 3.4–5)
ALBUMIN/GLOB SERPL: 1.3 RATIO
ALP SERPL-CCNC: 261 UNIT/L (ref 50–144)
ALT SERPL-CCNC: 33 UNIT/L (ref 1–45)
ANION GAP SERPL CALC-SCNC: 7 MEQ/L (ref 2–13)
AST SERPL-CCNC: 41 UNIT/L (ref 14–36)
BASOPHILS # BLD AUTO: 0.04 X10(3)/MCL (ref 0.01–0.08)
BASOPHILS NFR BLD AUTO: 0.2 % (ref 0.1–1.2)
BILIRUBIN DIRECT+TOT PNL SERPL-MCNC: 0.9 MG/DL (ref 0–1)
BUN SERPL-MCNC: 6 MG/DL (ref 7–20)
CALCIUM SERPL-MCNC: 9 MG/DL (ref 8.4–10.2)
CHLORIDE SERPL-SCNC: 107 MMOL/L (ref 98–110)
CO2 SERPL-SCNC: 20 MMOL/L (ref 21–32)
CREAT SERPL-MCNC: 0.53 MG/DL (ref 0.66–1.25)
CREAT/UREA NIT SERPL: 11 (ref 12–20)
EOSINOPHIL # BLD AUTO: 0.03 X10(3)/MCL (ref 0.04–0.36)
EOSINOPHIL NFR BLD AUTO: 0.2 % (ref 0.7–7)
ERYTHROCYTE [DISTWIDTH] IN BLOOD BY AUTOMATED COUNT: 14.5 % (ref 11–14.5)
GFR SERPLBLD CREATININE-BSD FMLA CKD-EPI: >90 MLS/MIN/1.73/M2
GLOBULIN SER-MCNC: 2.3 GM/DL (ref 2–3.9)
GLUCOSE SERPL-MCNC: 81 MG/DL (ref 70–115)
HBV SURFACE AG SERPL QL IA: NEGATIVE
HBV SURFACE AG SERPL QL IA: NORMAL
HCT VFR BLD AUTO: 33.7 % (ref 36–48)
HGB BLD-MCNC: 11.2 G/DL (ref 11.8–16)
IMM GRANULOCYTES # BLD AUTO: 0.28 X10(3)/MCL (ref 0–0.03)
IMM GRANULOCYTES NFR BLD AUTO: 1.5 % (ref 0–0.5)
LYMPHOCYTES # BLD AUTO: 1.32 X10(3)/MCL (ref 1.16–3.74)
LYMPHOCYTES NFR BLD AUTO: 7 % (ref 20–55)
MCH RBC QN AUTO: 28.9 PG (ref 27–34)
MCHC RBC AUTO-ENTMCNC: 33.2 G/DL (ref 31–37)
MCV RBC AUTO: 87.1 FL (ref 79–99)
MONOCYTES # BLD AUTO: 1.12 X10(3)/MCL (ref 0.24–0.36)
MONOCYTES NFR BLD AUTO: 5.9 % (ref 4.7–12.5)
NEUTROPHILS # BLD AUTO: 16.18 X10(3)/MCL (ref 1.56–6.13)
NEUTROPHILS NFR BLD AUTO: 85.2 % (ref 37–73)
NRBC BLD AUTO-RTO: 0 % (ref 0–1)
PLATELET # BLD AUTO: 279 X10(3)/MCL (ref 140–371)
PMV BLD AUTO: 11.2 FL (ref 9.4–12.4)
POTASSIUM SERPL-SCNC: 4.1 MMOL/L (ref 3.5–5.1)
PROT SERPL-MCNC: 5.2 GM/DL (ref 6.3–8.2)
RBC # BLD AUTO: 3.87 X10(6)/MCL (ref 4–5.1)
SODIUM SERPL-SCNC: 134 MMOL/L (ref 135–145)
WBC # SPEC AUTO: 18.97 X10(3)/MCL (ref 4–11.5)

## 2023-05-13 PROCEDURE — 59514 CESAREAN DELIVERY ONLY: CPT | Mod: GB,,, | Performed by: OBSTETRICS & GYNECOLOGY

## 2023-05-13 PROCEDURE — 85025 COMPLETE CBC W/AUTO DIFF WBC: CPT | Performed by: OBSTETRICS & GYNECOLOGY

## 2023-05-13 PROCEDURE — 25000003 PHARM REV CODE 250: Performed by: NURSE ANESTHETIST, CERTIFIED REGISTERED

## 2023-05-13 PROCEDURE — 59514 PR CESAREAN DELIVERY ONLY: ICD-10-PCS | Mod: GB,,, | Performed by: OBSTETRICS & GYNECOLOGY

## 2023-05-13 PROCEDURE — 87340 HEPATITIS B SURFACE AG IA: CPT | Performed by: OBSTETRICS & GYNECOLOGY

## 2023-05-13 PROCEDURE — 80053 COMPREHEN METABOLIC PANEL: CPT | Performed by: OBSTETRICS & GYNECOLOGY

## 2023-05-13 PROCEDURE — 25000003 PHARM REV CODE 250: Performed by: OBSTETRICS & GYNECOLOGY

## 2023-05-13 PROCEDURE — 36415 COLL VENOUS BLD VENIPUNCTURE: CPT | Performed by: OBSTETRICS & GYNECOLOGY

## 2023-05-13 PROCEDURE — 63600175 PHARM REV CODE 636 W HCPCS: Performed by: OBSTETRICS & GYNECOLOGY

## 2023-05-13 PROCEDURE — 36000709 HC OR TIME LEV III EA ADD 15 MIN: Performed by: OBSTETRICS & GYNECOLOGY

## 2023-05-13 PROCEDURE — 36000708 HC OR TIME LEV III 1ST 15 MIN: Performed by: OBSTETRICS & GYNECOLOGY

## 2023-05-13 PROCEDURE — 37000009 HC ANESTHESIA EA ADD 15 MINS: Performed by: OBSTETRICS & GYNECOLOGY

## 2023-05-13 PROCEDURE — 71000033 HC RECOVERY, INTIAL HOUR: Performed by: OBSTETRICS & GYNECOLOGY

## 2023-05-13 PROCEDURE — 11000001 HC ACUTE MED/SURG PRIVATE ROOM

## 2023-05-13 PROCEDURE — 87389 HIV-1 AG W/HIV-1&-2 AB AG IA: CPT | Performed by: OBSTETRICS & GYNECOLOGY

## 2023-05-13 PROCEDURE — 63600175 PHARM REV CODE 636 W HCPCS: Performed by: NURSE ANESTHETIST, CERTIFIED REGISTERED

## 2023-05-13 PROCEDURE — 37000008 HC ANESTHESIA 1ST 15 MINUTES: Performed by: OBSTETRICS & GYNECOLOGY

## 2023-05-13 RX ORDER — PROMETHAZINE HYDROCHLORIDE 25 MG/1
25 TABLET ORAL EVERY 6 HOURS PRN
Status: DISCONTINUED | OUTPATIENT
Start: 2023-05-13 | End: 2023-05-15 | Stop reason: HOSPADM

## 2023-05-13 RX ORDER — BUPIVACAINE HYDROCHLORIDE 5 MG/ML
INJECTION, SOLUTION EPIDURAL; INTRACAUDAL
Status: DISCONTINUED | OUTPATIENT
Start: 2023-05-13 | End: 2023-05-13

## 2023-05-13 RX ORDER — MISOPROSTOL 100 UG/1
1000 TABLET ORAL ONCE AS NEEDED
Status: DISCONTINUED | OUTPATIENT
Start: 2023-05-13 | End: 2023-05-15 | Stop reason: HOSPADM

## 2023-05-13 RX ORDER — DIPHENHYDRAMINE HCL 25 MG
25 CAPSULE ORAL EVERY 4 HOURS PRN
Status: DISCONTINUED | OUTPATIENT
Start: 2023-05-13 | End: 2023-05-15 | Stop reason: HOSPADM

## 2023-05-13 RX ORDER — MISOPROSTOL 100 UG/1
800 TABLET ORAL ONCE AS NEEDED
Status: DISCONTINUED | OUTPATIENT
Start: 2023-05-13 | End: 2023-05-15 | Stop reason: HOSPADM

## 2023-05-13 RX ORDER — CARBOPROST TROMETHAMINE 250 UG/ML
250 INJECTION, SOLUTION INTRAMUSCULAR
Status: DISCONTINUED | OUTPATIENT
Start: 2023-05-13 | End: 2023-05-15 | Stop reason: HOSPADM

## 2023-05-13 RX ORDER — HYDROCODONE BITARTRATE AND ACETAMINOPHEN 7.5; 325 MG/1; MG/1
1 TABLET ORAL EVERY 4 HOURS PRN
Status: DISCONTINUED | OUTPATIENT
Start: 2023-05-13 | End: 2023-05-15 | Stop reason: HOSPADM

## 2023-05-13 RX ORDER — TRANEXAMIC ACID 10 MG/ML
1000 INJECTION, SOLUTION INTRAVENOUS ONCE AS NEEDED
Status: DISCONTINUED | OUTPATIENT
Start: 2023-05-13 | End: 2023-05-15 | Stop reason: HOSPADM

## 2023-05-13 RX ORDER — ONDANSETRON 4 MG/1
8 TABLET, ORALLY DISINTEGRATING ORAL EVERY 8 HOURS PRN
Status: DISCONTINUED | OUTPATIENT
Start: 2023-05-13 | End: 2023-05-15 | Stop reason: HOSPADM

## 2023-05-13 RX ORDER — IBUPROFEN 600 MG/1
600 TABLET ORAL EVERY 6 HOURS PRN
Status: DISCONTINUED | OUTPATIENT
Start: 2023-05-14 | End: 2023-05-15 | Stop reason: HOSPADM

## 2023-05-13 RX ORDER — OXYTOCIN/RINGER'S LACTATE 30/500 ML
334 PLASTIC BAG, INJECTION (ML) INTRAVENOUS ONCE
Status: DISCONTINUED | OUTPATIENT
Start: 2023-05-13 | End: 2023-05-14

## 2023-05-13 RX ORDER — BISACODYL 10 MG
10 SUPPOSITORY, RECTAL RECTAL ONCE AS NEEDED
Status: DISCONTINUED | OUTPATIENT
Start: 2023-05-13 | End: 2023-05-15 | Stop reason: HOSPADM

## 2023-05-13 RX ORDER — CARBOPROST TROMETHAMINE 250 UG/ML
250 INJECTION, SOLUTION INTRAMUSCULAR
Status: DISCONTINUED | OUTPATIENT
Start: 2023-05-13 | End: 2023-05-14

## 2023-05-13 RX ORDER — OXYCODONE AND ACETAMINOPHEN 10; 325 MG/1; MG/1
1 TABLET ORAL EVERY 4 HOURS PRN
Status: DISCONTINUED | OUTPATIENT
Start: 2023-05-13 | End: 2023-05-15 | Stop reason: HOSPADM

## 2023-05-13 RX ORDER — OXYTOCIN/RINGER'S LACTATE 30/500 ML
95 PLASTIC BAG, INJECTION (ML) INTRAVENOUS ONCE AS NEEDED
Status: DISCONTINUED | OUTPATIENT
Start: 2023-05-13 | End: 2023-05-14

## 2023-05-13 RX ORDER — PHENYLEPHRINE HYDROCHLORIDE 10 MG/ML
INJECTION INTRAVENOUS
Status: DISCONTINUED | OUTPATIENT
Start: 2023-05-13 | End: 2023-05-13

## 2023-05-13 RX ORDER — SODIUM CHLORIDE, SODIUM LACTATE, POTASSIUM CHLORIDE, CALCIUM CHLORIDE 600; 310; 30; 20 MG/100ML; MG/100ML; MG/100ML; MG/100ML
INJECTION, SOLUTION INTRAVENOUS CONTINUOUS
Status: DISCONTINUED | OUTPATIENT
Start: 2023-05-13 | End: 2023-05-14

## 2023-05-13 RX ORDER — SIMETHICONE 80 MG
1 TABLET,CHEWABLE ORAL EVERY 6 HOURS PRN
Status: DISCONTINUED | OUTPATIENT
Start: 2023-05-13 | End: 2023-05-15 | Stop reason: HOSPADM

## 2023-05-13 RX ORDER — METHYLERGONOVINE MALEATE 0.2 MG/ML
200 INJECTION INTRAVENOUS
Status: DISCONTINUED | OUTPATIENT
Start: 2023-05-13 | End: 2023-05-15 | Stop reason: HOSPADM

## 2023-05-13 RX ORDER — AMOXICILLIN 250 MG
1 CAPSULE ORAL NIGHTLY PRN
Status: DISCONTINUED | OUTPATIENT
Start: 2023-05-13 | End: 2023-05-15 | Stop reason: HOSPADM

## 2023-05-13 RX ORDER — KETOROLAC TROMETHAMINE 30 MG/ML
30 INJECTION, SOLUTION INTRAMUSCULAR; INTRAVENOUS EVERY 8 HOURS
Status: COMPLETED | OUTPATIENT
Start: 2023-05-13 | End: 2023-05-13

## 2023-05-13 RX ORDER — OXYTOCIN/RINGER'S LACTATE 30/500 ML
334 PLASTIC BAG, INJECTION (ML) INTRAVENOUS ONCE AS NEEDED
Status: DISCONTINUED | OUTPATIENT
Start: 2023-05-13 | End: 2023-05-14

## 2023-05-13 RX ORDER — MUPIROCIN 20 MG/G
OINTMENT TOPICAL
Status: CANCELLED | OUTPATIENT
Start: 2023-05-13

## 2023-05-13 RX ORDER — SODIUM CHLORIDE 0.9 % (FLUSH) 0.9 %
10 SYRINGE (ML) INJECTION
Status: DISCONTINUED | OUTPATIENT
Start: 2023-05-13 | End: 2023-05-15 | Stop reason: HOSPADM

## 2023-05-13 RX ORDER — OXYTOCIN 10 [USP'U]/ML
10 INJECTION, SOLUTION INTRAMUSCULAR; INTRAVENOUS ONCE AS NEEDED
Status: DISCONTINUED | OUTPATIENT
Start: 2023-05-13 | End: 2023-05-15 | Stop reason: HOSPADM

## 2023-05-13 RX ORDER — PRENATAL WITH FERROUS FUM AND FOLIC ACID 3080; 920; 120; 400; 22; 1.84; 3; 20; 10; 1; 12; 200; 27; 25; 2 [IU]/1; [IU]/1; MG/1; [IU]/1; MG/1; MG/1; MG/1; MG/1; MG/1; MG/1; UG/1; MG/1; MG/1; MG/1; MG/1
1 TABLET ORAL DAILY
Status: DISCONTINUED | OUTPATIENT
Start: 2023-05-13 | End: 2023-05-15 | Stop reason: HOSPADM

## 2023-05-13 RX ORDER — ADHESIVE BANDAGE
30 BANDAGE TOPICAL 2 TIMES DAILY PRN
Status: DISCONTINUED | OUTPATIENT
Start: 2023-05-14 | End: 2023-05-15 | Stop reason: HOSPADM

## 2023-05-13 RX ORDER — FENTANYL CITRATE 50 UG/ML
INJECTION, SOLUTION INTRAMUSCULAR; INTRAVENOUS
Status: DISCONTINUED | OUTPATIENT
Start: 2023-05-13 | End: 2023-05-13

## 2023-05-13 RX ORDER — SODIUM CITRATE AND CITRIC ACID MONOHYDRATE 334; 500 MG/5ML; MG/5ML
30 SOLUTION ORAL
Status: DISCONTINUED | OUTPATIENT
Start: 2023-05-13 | End: 2023-05-14

## 2023-05-13 RX ORDER — OXYTOCIN/RINGER'S LACTATE 30/500 ML
95 PLASTIC BAG, INJECTION (ML) INTRAVENOUS ONCE
Status: DISCONTINUED | OUTPATIENT
Start: 2023-05-13 | End: 2023-05-14

## 2023-05-13 RX ORDER — MUPIROCIN 20 MG/G
OINTMENT TOPICAL
Status: DISCONTINUED | OUTPATIENT
Start: 2023-05-13 | End: 2023-05-14

## 2023-05-13 RX ORDER — ONDANSETRON 2 MG/ML
INJECTION INTRAMUSCULAR; INTRAVENOUS
Status: DISCONTINUED | OUTPATIENT
Start: 2023-05-13 | End: 2023-05-13

## 2023-05-13 RX ORDER — MISOPROSTOL 100 UG/1
1000 TABLET ORAL
Status: DISCONTINUED | OUTPATIENT
Start: 2023-05-13 | End: 2023-05-14

## 2023-05-13 RX ORDER — MIDAZOLAM HYDROCHLORIDE 1 MG/ML
INJECTION INTRAMUSCULAR; INTRAVENOUS
Status: DISCONTINUED | OUTPATIENT
Start: 2023-05-13 | End: 2023-05-13

## 2023-05-13 RX ORDER — CEFAZOLIN SODIUM 2 G/50ML
2 SOLUTION INTRAVENOUS ONCE AS NEEDED
Status: DISCONTINUED | OUTPATIENT
Start: 2023-05-13 | End: 2023-05-14

## 2023-05-13 RX ORDER — FAMOTIDINE 10 MG/ML
20 INJECTION INTRAVENOUS
Status: DISCONTINUED | OUTPATIENT
Start: 2023-05-13 | End: 2023-05-14

## 2023-05-13 RX ORDER — METHYLERGONOVINE MALEATE 0.2 MG/ML
200 INJECTION INTRAVENOUS
Status: DISCONTINUED | OUTPATIENT
Start: 2023-05-13 | End: 2023-05-14

## 2023-05-13 RX ORDER — HYDROMORPHONE HYDROCHLORIDE 1 MG/ML
1 INJECTION, SOLUTION INTRAMUSCULAR; INTRAVENOUS; SUBCUTANEOUS EVERY 4 HOURS PRN
Status: DISCONTINUED | OUTPATIENT
Start: 2023-05-13 | End: 2023-05-15 | Stop reason: HOSPADM

## 2023-05-13 RX ORDER — DOCUSATE SODIUM 100 MG/1
200 CAPSULE, LIQUID FILLED ORAL 2 TIMES DAILY
Status: DISCONTINUED | OUTPATIENT
Start: 2023-05-13 | End: 2023-05-15 | Stop reason: HOSPADM

## 2023-05-13 RX ORDER — HYDROCORTISONE 25 MG/G
CREAM TOPICAL 3 TIMES DAILY PRN
Status: DISCONTINUED | OUTPATIENT
Start: 2023-05-13 | End: 2023-05-15 | Stop reason: HOSPADM

## 2023-05-13 RX ORDER — MUPIROCIN 20 MG/G
OINTMENT TOPICAL 2 TIMES DAILY
Status: DISCONTINUED | OUTPATIENT
Start: 2023-05-13 | End: 2023-05-15 | Stop reason: HOSPADM

## 2023-05-13 RX ORDER — CEFAZOLIN SODIUM 2 G/50ML
2 SOLUTION INTRAVENOUS ONCE AS NEEDED
Status: DISCONTINUED | OUTPATIENT
Start: 2023-05-13 | End: 2023-05-13 | Stop reason: SDUPTHER

## 2023-05-13 RX ADMIN — KETOROLAC TROMETHAMINE 30 MG: 30 INJECTION, SOLUTION INTRAMUSCULAR; INTRAVENOUS at 06:05

## 2023-05-13 RX ADMIN — BUPIVACAINE HYDROCHLORIDE 3 ML: 5 INJECTION, SOLUTION EPIDURAL; INTRACAUDAL; PERINEURAL at 12:05

## 2023-05-13 RX ADMIN — LIDOCAINE HYDROCHLORIDE,EPINEPHRINE BITARTRATE 3 ML: 20; .005 INJECTION, SOLUTION EPIDURAL; INFILTRATION; INTRACAUDAL; PERINEURAL at 12:05

## 2023-05-13 RX ADMIN — HYDROCODONE BITARTRATE AND ACETAMINOPHEN 1 TABLET: 7.5; 325 TABLET ORAL at 10:05

## 2023-05-13 RX ADMIN — FENTANYL CITRATE 50 MCG: 50 INJECTION, SOLUTION INTRAMUSCULAR; INTRAVENOUS at 01:05

## 2023-05-13 RX ADMIN — PHENYLEPHRINE HYDROCHLORIDE 100 MCG: 10 INJECTION, SOLUTION INTRAVENOUS at 01:05

## 2023-05-13 RX ADMIN — PRENATAL VITAMINS-IRON FUMARATE 27 MG IRON-FOLIC ACID 0.8 MG TABLET 1 TABLET: at 11:05

## 2023-05-13 RX ADMIN — BUPIVACAINE HYDROCHLORIDE 3 ML: 5 INJECTION, SOLUTION EPIDURAL; INTRACAUDAL; PERINEURAL at 01:05

## 2023-05-13 RX ADMIN — ONDANSETRON 4 MG: 2 INJECTION INTRAMUSCULAR; INTRAVENOUS at 01:05

## 2023-05-13 RX ADMIN — SIMETHICONE 80 MG: 80 TABLET, CHEWABLE ORAL at 05:05

## 2023-05-13 RX ADMIN — OXYCODONE AND ACETAMINOPHEN 1 TABLET: 10; 325 TABLET ORAL at 07:05

## 2023-05-13 RX ADMIN — DOCUSATE SODIUM 200 MG: 100 CAPSULE, LIQUID FILLED ORAL at 10:05

## 2023-05-13 RX ADMIN — MIDAZOLAM HYDROCHLORIDE 1 MG: 1 INJECTION, SOLUTION INTRAMUSCULAR; INTRAVENOUS at 01:05

## 2023-05-13 RX ADMIN — MUPIROCIN: 20 OINTMENT TOPICAL at 01:05

## 2023-05-13 RX ADMIN — HYDROMORPHONE HYDROCHLORIDE 1 MG: 1 INJECTION, SOLUTION INTRAMUSCULAR; INTRAVENOUS; SUBCUTANEOUS at 03:05

## 2023-05-13 RX ADMIN — DOCUSATE SODIUM 200 MG: 100 CAPSULE, LIQUID FILLED ORAL at 11:05

## 2023-05-13 RX ADMIN — KETOROLAC TROMETHAMINE 30 MG: 30 INJECTION, SOLUTION INTRAMUSCULAR; INTRAVENOUS at 02:05

## 2023-05-13 RX ADMIN — Medication 300 ML/HR: at 01:05

## 2023-05-13 RX ADMIN — KETOROLAC TROMETHAMINE 30 MG: 30 INJECTION, SOLUTION INTRAMUSCULAR; INTRAVENOUS at 10:05

## 2023-05-13 RX ADMIN — OXYCODONE AND ACETAMINOPHEN 1 TABLET: 10; 325 TABLET ORAL at 11:05

## 2023-05-13 NOTE — SUBJECTIVE & OBJECTIVE
Interval History:  Jamia is a 27 y.o.  at 39w1d. She required pitocin for augmentation and progressed to 7cm by 2100.  At 1930, she began experiencing intermittent late FHR decels. FHR responded to repositioning, which had to be performed several times.  Shortly after reaching 7cm dilation, she began having recurrent lates.  Pitocin was stopped at 2200 and IVF bolus was given.  She responded slowly to the bolus but variability did improve for a short time. Contractions spaced out, but recurrent late decels continued.  Cervix remained unchanged, variability remained moderate.  We attempted to restart the pitocin to improve contractions frequency, but late decels have persisted and variability declined again.  She was rechecked just now and remains 7cm dilation.  Fetal station is still high in the pelvis.   Objective:     Vital Signs (Most Recent):  Temp: 97.7 °F (36.5 °C) (23)  Pulse: 81 (23)  Resp: 16 (23 0756)  BP: 130/67 (23)  SpO2: 99 % (23 0756) Vital Signs (24h Range):  Temp:  [97.7 °F (36.5 °C)-98.1 °F (36.7 °C)] 97.7 °F (36.5 °C)  Pulse:  [] 81  Resp:  [16-18] 16  SpO2:  [99 %] 99 %  BP: (103-131)/(55-77) 130/67     Weight: 66.2 kg (146 lb)  Body mass index is 25.06 kg/m².    FHT: 140 Cat 2 (non-reassuring)  TOCO:  Q 2-5 minutes    Cervical Exam:  Dilation:  7  Effacement:  100%  Station: -3  Presentation: Vertex     Significant Labs:  Lab Results   Component Value Date    GROUPTRH O POS 2022       I have personallly reviewed all pertinent lab results from the last 24 hours.  None

## 2023-05-13 NOTE — PROGRESS NOTES
[facility]  Obstetrics  Labor Progress Note    Patient Name: Jamia Smith  MRN: [id number]  Admission Date: 2023  Hospital Length of Stay: 1 days  Attending Physician: Davide Hood MD  Primary Care Provider: DEREJE SOARES    Subjective:     Principal Problem:GBS (group B Streptococcus carrier), +RV culture, currently pregnant    Interval History:  Jamia is a 27 y.o.  at 39w1d. She required pitocin for augmentation and progressed to 7cm by 2100.  At 1930, she began experiencing intermittent late FHR decels. FHR responded to repositioning, which had to be performed several times.  Shortly after reaching 7cm dilation, she began having recurrent lates.  Pitocin was stopped at 2200 and IVF bolus was given.  She responded slowly to the bolus but variability did improve for a short time. Contractions spaced out, but recurrent late decels continued.  Cervix remained unchanged, variability remained moderate.  We attempted to restart the pitocin to improve contractions frequency, but late decels have persisted and variability declined again.  She was rechecked just now and remains 7cm dilation.  Fetal station is still high in the pelvis.   Objective:     Vital Signs (Most Recent):  Temp: 97.7 °F (36.5 °C) (23)  Pulse: 81 (23)  Resp: 16 (23 075)  BP: 130/67 (23)  SpO2: 99 % (23 0756) Vital Signs (24h Range):  Temp:  [97.7 °F (36.5 °C)-98.1 °F (36.7 °C)] 97.7 °F (36.5 °C)  Pulse:  [] 81  Resp:  [16-18] 16  SpO2:  [99 %] 99 %  BP: (103-131)/(55-77) 130/67     Weight: 66.2 kg (146 lb)  Body mass index is 25.06 kg/m².    FHT: 140 Cat 2 (non-reassuring)  TOCO:  Q 2-5 minutes    Cervical Exam:  Dilation:  7  Effacement:  100%  Station: -3  Presentation: Vertex     Significant Labs:  Lab Results   Component Value Date    GROUPTRH O POS 2022       I have personallly reviewed all pertinent lab results from the last 24  hours.  None    Assessment/Plan:     27 y.o. female  at 39w1d with persistent category 2 FHR tracing, recurrent late decels, and no cervical change from 7cm for 3 hours    Active Hospital Problems    Diagnosis  POA    *GBS (group B Streptococcus carrier), +RV culture, currently pregnant [O99.820]  Not Applicable    Asthma during pregnancy [O99.519, J45.909]  Yes    Anxiety during pregnancy [O99.340, F41.9]  Yes    Maternal drug use complicating pregnancy in second trimester, antepartum [O99.322]  Yes    Marijuana user [F12.90]  Yes    Depression complicating pregnancy in third trimester, antepartum [O99.343, F32.A]  Yes      Resolved Hospital Problems   No resolved problems to display.     Discussed FHR pattern and lack of progression.  Discussed fetal intolerance to labor.  Recommend  delivery now.  Pt agrees  To OR when ready  Stop pit  IVF bolus  Obstetrics  [facility]

## 2023-05-13 NOTE — BRIEF OP NOTE
[facility]  Brief Operative Note    SUMMARY     Surgery Date: 2023     Surgeon(s) and Role:     * Davide Hood MD - Primary    Assisting Surgeon: None    Pre-op Diagnosis:  Failure to progress in labor [O62.2]    Post-op Diagnosis:  Post-Op Diagnosis Codes:     * Failure to progress in labor [O62.2]     * Nuchal cord affecting delivery [O69.81X0]     * Fetal intolerance to labor, delivered, current hospitalization [O77.9]     * Maternal drug use complicating pregnancy, antepartum [O99.320]    Procedure(s) (LRB):   SECTION (N/A)    Anesthesia: Spinal/Epidural    Operative Findings: nuchal cord.  See op note    Estimated Blood Loss: * No values recorded between 2023 12:00 AM and 2023  1:42 AM *    Estimated Blood Loss has not been documented. EBL = 600.         Specimens:   Specimen (24h ago, onward)      None            [id number]

## 2023-05-13 NOTE — ANESTHESIA POSTPROCEDURE EVALUATION
Anesthesia Post Evaluation    Patient: Jamia Smith    Procedure(s) Performed: * No procedures listed *    Final Anesthesia Type: epidural      Patient location during evaluation: PACU  Patient participation: Yes- Able to Participate  Level of consciousness: awake and alert, awake and oriented  Post-procedure vital signs: reviewed and stable  Pain management: adequate  Airway patency: patent    PONV status at discharge: No PONV  Anesthetic complications: no      Cardiovascular status: blood pressure returned to baseline  Respiratory status: unassisted, room air and spontaneous ventilation  Hydration status: euvolemic  Follow-up not needed.          Vitals Value Taken Time   /64 05/13/23 0147   Temp 36.5 °C (97.7 °F) 05/12/23 2103   Pulse 103 05/13/23 0148   Resp 16 05/12/23 0756   SpO2 97 % 05/13/23 0148   Vitals shown include unvalidated device data.      No case tracking events are documented in the log.      Pain/Erick Score: Pain Rating Prior to Med Admin: 9 (5/12/2023  1:51 PM)

## 2023-05-13 NOTE — OP NOTE
DATE OF PROCEDURE: 2023    PRE-OP DIAGNOSIS:  39w1d  Failure to progress in labor [O62.2]     * Fetal intolerance to labor, delivered, current hospitalization [O77.9]     * Maternal drug use complicating pregnancy, antepartum [O99.320]    POST-OP DIAGNOSIS:  39w1d  Post-Op Diagnosis Codes:     * Failure to progress in labor [O62.2]     * Nuchal cord affecting delivery [O69.81X0]     * Fetal intolerance to labor, delivered, current hospitalization [O77.9]     * Maternal drug use complicating pregnancy, antepartum [O99.320]    PROCEDURE:   Procedure(s) (LRB):   SECTION (N/A)    SURGEON: Davide Hood MD    ANESTHESIA: Spinal    ESTIMATED BLOOD LOSS:   600 cc    After consents were reviewed patient was taken to the operating room where a time-out was held.  She was placed on the OR table in the dorsal supine position.  She was prepped and draped in standard sterile fashion.  A Pfannenstiel skin incision was made and carried down to the underlying fascia.  The fascia was incised and rectus muscles were dissected superiorly and inferiorly.  Muscles were  in the midline the peritoneum was tented up and entered sharply with Collins scissors.  The vesicouterine peritoneal fold was incised with Metzenbaum scissors and a bladder flap was created.  The bladder was deflected with bladder blade.  A low-transverse hysterotomy was made the fetal head was grasped and brought through the hysterotomy.  Nuchal cord was noted and was reduced.  Fetal head was located above the bony pelvis.  The remainder of the baby was delivered and placed on the mother's abdomen the cord was milked clamped and cut and baby was handed off to awaiting nurses for initial resuscitation.  Apgar scores were 9 and 9 at 1 and 5 minutes, respectively.  A section of cord was obtained for cord gases.  The placenta was delivered spontaneously intact and handed off.  The uterus was exteriorized and cleaned with a dry lap.  The hysterotomy was  then closed with a running locking layer of 0 Monocryl and imbricated with a 2nd layer of 0 Monocryl.  The pelvis was irrigated and freed of all clots and debris.  After ensuring hemostasis muscles and peritoneum were closed with interrupted sutures of 0 chromic gut.  Fascia was closed with a PDS suture. Subcutaneous tissues were irrigated, and Yg's fascia was closed with 3-0 plain gut.  Skin was closed with a 3-0 undyed Vicryl in a subcuticular fashion. Dermabond was applied.  Patient was awakened and taken to the recovery room in stable condition having tolerated the procedure very well.  Sponge, lap, needle count correct at end of procedure.

## 2023-05-14 LAB
BASOPHILS # BLD AUTO: 0.03 X10(3)/MCL (ref 0.01–0.08)
BASOPHILS NFR BLD AUTO: 0.3 % (ref 0.1–1.2)
EOSINOPHIL # BLD AUTO: 0.21 X10(3)/MCL (ref 0.04–0.36)
EOSINOPHIL NFR BLD AUTO: 1.8 % (ref 0.7–7)
ERYTHROCYTE [DISTWIDTH] IN BLOOD BY AUTOMATED COUNT: 14.5 % (ref 11–14.5)
HCT VFR BLD AUTO: 27.9 % (ref 36–48)
HGB BLD-MCNC: 9.1 G/DL (ref 11.8–16)
IMM GRANULOCYTES # BLD AUTO: 0.1 X10(3)/MCL (ref 0–0.03)
IMM GRANULOCYTES NFR BLD AUTO: 0.8 % (ref 0–0.5)
LYMPHOCYTES # BLD AUTO: 2.04 X10(3)/MCL (ref 1.16–3.74)
LYMPHOCYTES NFR BLD AUTO: 17.2 % (ref 20–55)
MCH RBC QN AUTO: 28.8 PG (ref 27–34)
MCHC RBC AUTO-ENTMCNC: 32.6 G/DL (ref 31–37)
MCV RBC AUTO: 88.3 FL (ref 79–99)
MONOCYTES # BLD AUTO: 0.8 X10(3)/MCL (ref 0.24–0.36)
MONOCYTES NFR BLD AUTO: 6.7 % (ref 4.7–12.5)
NEUTROPHILS # BLD AUTO: 8.71 X10(3)/MCL (ref 1.56–6.13)
NEUTROPHILS NFR BLD AUTO: 73.2 % (ref 37–73)
NRBC BLD AUTO-RTO: 0 % (ref 0–1)
PLATELET # BLD AUTO: 248 X10(3)/MCL (ref 140–371)
PMV BLD AUTO: 11 FL (ref 9.4–12.4)
RBC # BLD AUTO: 3.16 X10(6)/MCL (ref 4–5.1)
WBC # SPEC AUTO: 11.89 X10(3)/MCL (ref 4–11.5)

## 2023-05-14 PROCEDURE — 99231 PR SUBSEQUENT HOSPITAL CARE,LEVL I: ICD-10-PCS | Mod: ,,, | Performed by: OBSTETRICS & GYNECOLOGY

## 2023-05-14 PROCEDURE — 11000001 HC ACUTE MED/SURG PRIVATE ROOM

## 2023-05-14 PROCEDURE — 36415 COLL VENOUS BLD VENIPUNCTURE: CPT | Performed by: OBSTETRICS & GYNECOLOGY

## 2023-05-14 PROCEDURE — 85025 COMPLETE CBC W/AUTO DIFF WBC: CPT | Performed by: OBSTETRICS & GYNECOLOGY

## 2023-05-14 PROCEDURE — 25000003 PHARM REV CODE 250: Performed by: OBSTETRICS & GYNECOLOGY

## 2023-05-14 PROCEDURE — 99231 SBSQ HOSP IP/OBS SF/LOW 25: CPT | Mod: ,,, | Performed by: OBSTETRICS & GYNECOLOGY

## 2023-05-14 RX ADMIN — IBUPROFEN 600 MG: 600 TABLET, FILM COATED ORAL at 05:05

## 2023-05-14 RX ADMIN — IBUPROFEN 600 MG: 600 TABLET, FILM COATED ORAL at 11:05

## 2023-05-14 RX ADMIN — HYDROCODONE BITARTRATE AND ACETAMINOPHEN 1 TABLET: 7.5; 325 TABLET ORAL at 02:05

## 2023-05-14 RX ADMIN — IBUPROFEN 600 MG: 600 TABLET, FILM COATED ORAL at 12:05

## 2023-05-14 RX ADMIN — IBUPROFEN 600 MG: 600 TABLET, FILM COATED ORAL at 06:05

## 2023-05-14 RX ADMIN — DOCUSATE SODIUM 200 MG: 100 CAPSULE, LIQUID FILLED ORAL at 12:05

## 2023-05-14 RX ADMIN — DOCUSATE SODIUM 200 MG: 100 CAPSULE, LIQUID FILLED ORAL at 08:05

## 2023-05-14 RX ADMIN — PRENATAL VITAMINS-IRON FUMARATE 27 MG IRON-FOLIC ACID 0.8 MG TABLET 1 TABLET: at 12:05

## 2023-05-14 RX ADMIN — HYDROCODONE BITARTRATE AND ACETAMINOPHEN 1 TABLET: 7.5; 325 TABLET ORAL at 11:05

## 2023-05-14 NOTE — PROGRESS NOTES
[facility]  Obstetrics  Postpartum Progress Note    Patient Name: Jamia Smith  MRN: [id number]  Admission Date: 2023  Hospital Length of Stay: 2 days  Attending Physician: Davide Hood MD  Primary Care Provider: DEREJE SOARES    Subjective:     Principal Problem:GBS (group B Streptococcus carrier), +RV culture, currently pregnant    Hospital course: 27 y.o. female  at 39w1d presented for delivery.  She subsequently underwent C/s for failure to progress with recurrent FHR decels early yesterday morning.     Interval History:     She is doing well this morning. She is tolerating a regular diet without nausea or vomiting. She is voiding spontaneously. She is ambulating. She has passed flatus, and has not a BM. Vaginal bleeding is mild. She denies fever or chills. Abdominal pain is mild and controlled with oral medications. She Is not breastfeeding.    Objective:     Vital Signs (Most Recent):  Temp: 98.1 °F (36.7 °C) (23)  Pulse: 77 (23)  Resp: 20 (23)  BP: (!) 109/57 (23)  SpO2: 97 % (23) Vital Signs (24h Range):  Temp:  [97.3 °F (36.3 °C)-98.1 °F (36.7 °C)] 98.1 °F (36.7 °C)  Pulse:  [77-89] 77  Resp:  [17-20] 20  SpO2:  [97 %] 97 %  BP: (108-129)/(54-68) 109/57     Weight: 66.2 kg (146 lb)  Body mass index is 25.06 kg/m².      Intake/Output Summary (Last 24 hours) at 2023 1022  Last data filed at 2023 1100  Gross per 24 hour   Intake --   Output 1200 ml   Net -1200 ml       Physical Exam:   Constitutional: She appears well-developed and well-nourished. No distress.       Cardiovascular:       Exam reveals no clubbing, no cyanosis and no edema.        Pulmonary/Chest: Effort normal. No respiratory distress.        Abdominal: Soft. There is abdominal tenderness (appropriate).                 Neurological: She is alert.    Skin: No cyanosis. Nails show no clubbing.    Psychiatric: She has a normal mood and  affect. Her behavior is normal. Judgment and thought content normal.     Significant Labs:  Lab Results   Component Value Date    GROUPTRH O POS 2022     Recent Labs   Lab 23  0610   HGB 9.1*   HCT 27.9*       I have personallly reviewed all pertinent lab results from the last 24 hours.  Recent Lab Results         23  0610        Baso # 0.03       Basophil % 0.3       Eos # 0.21       Eosinophil % 1.8       Hematocrit 27.9       Hemoglobin 9.1       Immature Grans (Abs) 0.10       Immature Granulocytes 0.8       Lymph # 2.04       LYMPH % 17.2       MCH 28.8       MCHC 32.6       MCV 88.3       Mono # 0.80       Mono % 6.7       MPV 11.0       Neut # 8.71       Neut % 73.2       nRBC 0.0       Platelets 248       RBC 3.16       RDW 14.5       WBC 11.89               Assessment/Plan:     27 y.o. female  at 39w1d for:    Active Diagnoses:    Diagnosis Date Noted POA    PRINCIPAL PROBLEM:  GBS (group B Streptococcus carrier), +RV culture, currently pregnant [O99.820] 2023 Not Applicable    Asthma during pregnancy [O99.519, J45.909] 2023 Yes    Anxiety during pregnancy [O99.340, F41.9] 2023 Yes    Maternal drug use complicating pregnancy in second trimester, antepartum [O99.322] 2023 Yes    Marijuana user [F12.90] 2023 Yes    Depression complicating pregnancy in third trimester, antepartum [O99.343, F32.A] 2023 Yes      Problems Resolved During this Admission:       Routine PP care  Pain control  Ambulate  Breast feeding encouraged  SCDs while in bed    Disposition: As patient meets milestones, will plan to discharge tomorrow if stable.    EZRA NUGENT MD  Obstetrics  [facility]

## 2023-05-15 VITALS
DIASTOLIC BLOOD PRESSURE: 76 MMHG | WEIGHT: 146 LBS | OXYGEN SATURATION: 99 % | RESPIRATION RATE: 18 BRPM | HEART RATE: 74 BPM | SYSTOLIC BLOOD PRESSURE: 116 MMHG | TEMPERATURE: 98 F | HEIGHT: 64 IN | BODY MASS INDEX: 24.92 KG/M2

## 2023-05-15 PROBLEM — O99.019 ANEMIA OF PREGNANCY: Status: ACTIVE | Noted: 2023-05-15

## 2023-05-15 PROBLEM — J45.909 ASTHMA DURING PREGNANCY: Status: RESOLVED | Noted: 2023-03-21 | Resolved: 2023-05-15

## 2023-05-15 PROBLEM — O99.519 ASTHMA DURING PREGNANCY: Status: RESOLVED | Noted: 2023-03-21 | Resolved: 2023-05-15

## 2023-05-15 PROBLEM — O99.820 GBS (GROUP B STREPTOCOCCUS CARRIER), +RV CULTURE, CURRENTLY PREGNANT: Status: RESOLVED | Noted: 2023-05-12 | Resolved: 2023-05-15

## 2023-05-15 PROBLEM — Z98.891 STATUS POST CESAREAN SECTION: Status: ACTIVE | Noted: 2023-05-15

## 2023-05-15 LAB — HIV 1+2 AB+HIV1 P24 AG SERPL QL IA: NONREACTIVE

## 2023-05-15 PROCEDURE — 99238 PR HOSPITAL DISCHARGE DAY,<30 MIN: ICD-10-PCS | Mod: ,,, | Performed by: OBSTETRICS & GYNECOLOGY

## 2023-05-15 PROCEDURE — 25000003 PHARM REV CODE 250: Performed by: OBSTETRICS & GYNECOLOGY

## 2023-05-15 PROCEDURE — 25000003 PHARM REV CODE 250

## 2023-05-15 PROCEDURE — 99238 HOSP IP/OBS DSCHRG MGMT 30/<: CPT | Mod: ,,, | Performed by: OBSTETRICS & GYNECOLOGY

## 2023-05-15 RX ORDER — HYDROCODONE BITARTRATE AND ACETAMINOPHEN 7.5; 325 MG/1; MG/1
1 TABLET ORAL EVERY 6 HOURS PRN
Qty: 12 TABLET | Refills: 0 | Status: SHIPPED | OUTPATIENT
Start: 2023-05-15 | End: 2023-06-08

## 2023-05-15 RX ORDER — IRON,CARBONYL/ASCORBIC ACID 100-250 MG
1 TABLET ORAL 2 TIMES DAILY
Qty: 60 TABLET | Refills: 1 | Status: SHIPPED | OUTPATIENT
Start: 2023-05-15 | End: 2023-09-11

## 2023-05-15 RX ADMIN — PRENATAL VITAMINS-IRON FUMARATE 27 MG IRON-FOLIC ACID 0.8 MG TABLET 1 TABLET: at 08:05

## 2023-05-15 RX ADMIN — IBUPROFEN 600 MG: 600 TABLET, FILM COATED ORAL at 05:05

## 2023-05-15 RX ADMIN — DOCUSATE SODIUM 200 MG: 100 CAPSULE, LIQUID FILLED ORAL at 08:05

## 2023-05-15 NOTE — HOSPITAL COURSE
She developed recurrent late FHR decelerations and failed to progress beyond 7 cm dilation and was delivered by  on 23.  Her postpartum course has been uneventful. She is tolerating routine advances.  Pain is controlled.  Bleeding is minimal. She is stable for discharge home at this time.

## 2023-05-15 NOTE — DISCHARGE SUMMARY
[facility]  Obstetrics  Discharge Summary      Patient Name: Jamia Smith  MRN: [id number]  Admission Date: 2023  Hospital Length of Stay: 3 days  Discharge Date and Time:  05/15/2023 7:14 AM  Attending Physician: Davide Hood MD   Discharging Provider: DAVIDE HOOD MD   Primary Care Provider: DEREJE SOARES    HPI: 27 y.o. female  at 39w0d presented for induction per maternal request.  She received cytotec overnight.  Membranes were ruptured artificially around 1400 on 23. She has received epidural anesthesia.             Procedure(s) (LRB):   SECTION (N/A)     Hospital Course:   She developed recurrent late FHR decelerations and failed to progress beyond 7 cm dilation and was delivered by  on 23.  Her postpartum course has been uneventful. She is tolerating routine advances.  Pain is controlled.  Bleeding is minimal. She is stable for discharge home at this time.            Final Active Diagnoses:    Diagnosis Date Noted POA    PRINCIPAL PROBLEM:  Status post  section [Z98.891] 05/15/2023 Not Applicable    Anemia of pregnancy [O99.019] 05/15/2023 No    Anxiety during pregnancy [O99.340, F41.9] 2023 Yes    Maternal drug use complicating pregnancy in second trimester, antepartum [O99.322] 2023 Yes    Marijuana user [F12.90] 2023 Yes    Depression complicating pregnancy in third trimester, antepartum [O99.343, F32.A] 2023 Yes      Problems Resolved During this Admission:    Diagnosis Date Noted Date Resolved POA    GBS (group B Streptococcus carrier), +RV culture, currently pregnant [O99.820] 2023 05/15/2023 Not Applicable    Asthma during pregnancy [O99.519, J45.909] 2023 05/15/2023 Yes        Significant Diagnostic Studies: Postop H/H: 9.1/27.9    Feeding Method: bottle    Immunizations       Date Immunization Status Dose Route/Site Given by    23 0617 MMR Incomplete 0.5 mL Subcutaneous/     23  0617 Tdap Incomplete 0.5 mL Intramuscular/             Delivery:    Episiotomy:     Lacerations:     Repair suture:     Repair # of packets:     Blood loss (ml):       Birth information:  YOB: 2021   Time of birth: 1:11 AM   Sex: male   Delivery type: , Low Transverse   Gestational Age: 39w1d    Delivery Clinician:      Other providers:       Additional  information:  Forceps:    Vacuum:    Breech:    Observed anomalies      Living?:           APGARS  One minute Five minutes Ten minutes   Skin color:         Heart rate:         Grimace:         Muscle tone:         Breathing:         Totals: 9  9        Placenta: Delivered:       appearance    Pending Diagnostic Studies:       Procedure Component Value Units Date/Time    HIV / Ag/Ab (4th Gen) [[id number]] Collected: 23    Order Status: Sent Lab Status: In process Updated: 23    Specimen: Blood             Discharged Condition: good    Disposition: Home or Self Care    Follow Up: In 6 weeks for postpartum visit    Patient Instructions:   No discharge procedures on file.  Medications:  Current Discharge Medication List        START taking these medications    Details   HYDROcodone-acetaminophen (NORCO) 7.5-325 mg per tablet Take 1 tablet by mouth every 6 (six) hours as needed for Pain.  Qty: 12 tablet, Refills: 0    Comments: Quantity prescribed more than 7 day supply? No      iron-vitamin C 100-250 mg, ICAR-C, 100-250 mg Tab Take 1 tablet by mouth 2 (two) times a day.  Qty: 60 tablet, Refills: 1           CONTINUE these medications which have NOT CHANGED    Details   albuterol (PROVENTIL) 2.5 mg /3 mL (0.083 %) nebulizer solution Take 3 mLs (2.5 mg total) by nebulization every 6 (six) hours as needed for Wheezing or Shortness of Breath. Rescue  Qty: 90 mL, Refills: 0      albuterol (VENTOLIN HFA) 90 mcg/actuation inhaler Inhale 2 puffs into the lungs every 6 (six) hours as needed for Wheezing or Shortness of Breath.  Rescue  Qty: 18 g, Refills: 11      nebulizer accessories Kit by Misc.(Non-Drug; Combo Route) route.      prenatal vit/iron fum/folic ac (PRENATAL 1+1 ORAL) Take by mouth.           STOP taking these medications       azithromycin (Z-FLORESITA) 250 MG tablet Comments:   Reason for Stopping:         nitrofurantoin, macrocrystal-monohydrate, (MACROBID) 100 MG capsule Comments:   Reason for Stopping:         ondansetron (ZOFRAN) 4 MG tablet Comments:   Reason for Stopping:         predniSONE (DELTASONE) 20 MG tablet Comments:   Reason for Stopping:         sertraline (ZOLOFT) 25 MG tablet Comments:   Reason for Stopping:           No lifting more than 20 lbs and no intercourse for 6 weeks.  No driving for 2 weeks and/or while taking narcotic pain medication.  Shower daily with soap and water; otherwise keep wound clean and dry.  Pain, fever, bleeding, pre-eclampsia, thromboembolism, and postpartum depression precautions are given.     EZRA NUGENT MD  Obstetrics  [facility]

## 2023-05-29 PROBLEM — O23.42 URINARY TRACT INFECTION IN MOTHER DURING SECOND TRIMESTER OF PREGNANCY: Status: RESOLVED | Noted: 2023-02-22 | Resolved: 2023-05-29

## 2023-06-08 ENCOUNTER — OFFICE VISIT (OUTPATIENT)
Dept: FAMILY MEDICINE | Facility: CLINIC | Age: 28
End: 2023-06-08
Payer: MEDICAID

## 2023-06-08 VITALS
SYSTOLIC BLOOD PRESSURE: 118 MMHG | OXYGEN SATURATION: 99 % | BODY MASS INDEX: 21.24 KG/M2 | WEIGHT: 124.38 LBS | HEIGHT: 64 IN | DIASTOLIC BLOOD PRESSURE: 60 MMHG | TEMPERATURE: 98 F | HEART RATE: 65 BPM

## 2023-06-08 DIAGNOSIS — F32.A MILD DEPRESSION: Primary | ICD-10-CM

## 2023-06-08 DIAGNOSIS — F41.9 ANXIETY: ICD-10-CM

## 2023-06-08 DIAGNOSIS — Z13.31 POSITIVE DEPRESSION SCREENING: ICD-10-CM

## 2023-06-08 PROCEDURE — 3074F SYST BP LT 130 MM HG: CPT | Mod: CPTII,,, | Performed by: NURSE PRACTITIONER

## 2023-06-08 PROCEDURE — 3078F DIAST BP <80 MM HG: CPT | Mod: CPTII,,, | Performed by: NURSE PRACTITIONER

## 2023-06-08 PROCEDURE — 1159F MED LIST DOCD IN RCRD: CPT | Mod: CPTII,,, | Performed by: NURSE PRACTITIONER

## 2023-06-08 PROCEDURE — 1160F PR REVIEW ALL MEDS BY PRESCRIBER/CLIN PHARMACIST DOCUMENTED: ICD-10-PCS | Mod: CPTII,,, | Performed by: NURSE PRACTITIONER

## 2023-06-08 PROCEDURE — 99214 PR OFFICE/OUTPT VISIT, EST, LEVL IV, 30-39 MIN: ICD-10-PCS | Mod: ,,, | Performed by: NURSE PRACTITIONER

## 2023-06-08 PROCEDURE — 3078F PR MOST RECENT DIASTOLIC BLOOD PRESSURE < 80 MM HG: ICD-10-PCS | Mod: CPTII,,, | Performed by: NURSE PRACTITIONER

## 2023-06-08 PROCEDURE — 3074F PR MOST RECENT SYSTOLIC BLOOD PRESSURE < 130 MM HG: ICD-10-PCS | Mod: CPTII,,, | Performed by: NURSE PRACTITIONER

## 2023-06-08 PROCEDURE — 3008F PR BODY MASS INDEX (BMI) DOCUMENTED: ICD-10-PCS | Mod: CPTII,,, | Performed by: NURSE PRACTITIONER

## 2023-06-08 PROCEDURE — 1159F PR MEDICATION LIST DOCUMENTED IN MEDICAL RECORD: ICD-10-PCS | Mod: CPTII,,, | Performed by: NURSE PRACTITIONER

## 2023-06-08 PROCEDURE — 1160F RVW MEDS BY RX/DR IN RCRD: CPT | Mod: CPTII,,, | Performed by: NURSE PRACTITIONER

## 2023-06-08 PROCEDURE — 3008F BODY MASS INDEX DOCD: CPT | Mod: CPTII,,, | Performed by: NURSE PRACTITIONER

## 2023-06-08 PROCEDURE — 99214 OFFICE O/P EST MOD 30 MIN: CPT | Mod: ,,, | Performed by: NURSE PRACTITIONER

## 2023-06-08 RX ORDER — BUSPIRONE HYDROCHLORIDE 5 MG/1
5 TABLET ORAL 2 TIMES DAILY
Qty: 60 TABLET | Refills: 1 | Status: SHIPPED | OUTPATIENT
Start: 2023-06-08 | End: 2023-09-11

## 2023-06-08 RX ORDER — FLUOXETINE HYDROCHLORIDE 20 MG/1
20 CAPSULE ORAL DAILY
Qty: 30 CAPSULE | Refills: 2 | Status: SHIPPED | OUTPATIENT
Start: 2023-06-08 | End: 2023-09-11

## 2023-06-08 NOTE — PROGRESS NOTES
Patient ID: Jamia Smith  : 1995     Chief Complaint: Depression (Medication for depression)    Allergies: Patient is allergic to iodinated contrast media, iodine, and shrimp.     History of Present Illness:  The patient is a 27 y.o. White female who presents to clinic for evaluation and management with a chief complaint of Depression (Medication for depression)   Patient is 1 month post partum. Patient admits to a good support system. Is in stable relationship with father of .     Depression Patient Health Questionnaire (PHQ-2)  Over the last two weeks how often have you been bothered by little interest or pleasure in doing things: More than half the days  Over the last two weeks how often have you been bothered by feeling down, depressed or hopeless: More than half the days  PHQ-2 Total Score: 4  Depression Patient Health Questionnaire (PHQ-9)  Over the last two weeks how often have you been bothered by little interest or pleasure in doing things: More than half the days  Over the last two weeks how often have you been bothered by feeling down, depressed or hopeless: More than half the days  Over the last two weeks how often have you been bothered by trouble falling or staying asleep, or sleeping too much: Not at all  Over the last two weeks how often have you been bothered by feeling tired or having little energy: More than half the days  Over the last two weeks how often have you been bothered by a poor appetite or overeating: Not at all  Over the last two weeks how often have you been bothered by feeling bad about yourself - or that you are a failure or have let yourself or your family down: Not at all  Over the last two weeks how often have you been bothered by trouble concentrating on things, such as reading the newspaper or watching television: Not at all  Over the last two weeks how often have you been bothered by moving or speaking so slowly that other people could have noticed. Or the  opposite - being so fidgety or restless that you have been moving around a lot more than usual.: Not at all  Over the last two weeks how often have you been bothered by thoughts that you would be better off dead, or of hurting yourself: Not at all  If you checked off any problems, how difficult have these problems made it for you to do your work, take care of things at home or get along with other people?: Not difficult at all  PHQ-9 Score: 6  PHQ-9 Interpretation: Mild     Generalized Anxiety Disorder 7-item (ADOLFO-7) Scale. HOW OFTEN DURING THE PAST 2 WEEKS HAVE YOU FELT BOTHERED BY:  1. Feeling nervous, anxious, or on edge?: Nearly everyday  2. Not being able to stop or control worrying?: Several days  3. Worrying too much about different things?: Nearly everyday  4. Trouble relaxing?: Several days  5. Being so restless that it is hard to sit still?: Not at all  6. Becoming easily annoyed or irritable?: Several days  7. Feeling afraid as if something awful might happen?: Not at all  8. If you checked off any problems, how difficult have these problems made it for you to do your work, take care of things at home, or get along with other people?: Somewhat difficult  ADOLFO-7 Score: 9  Number answered (out of first 7): 7  Interpretation: Mild Anxiety          Depression  Visit Type: initial  Onset of symptoms: more than 5 years ago  Progression since onset: gradually worsening  Patient presents with the following symptoms: depressed mood, excessive worry, fatigue, irritability and nervousness/anxiety.  Patient is not experiencing: anhedonia, chest pain, choking sensation, compulsions, confusion, decreased concentration, dizziness, dry mouth, feelings of hopelessness, feelings of worthlessness, hypersomnia, hyperventilation, impotence, insomnia, malaise, memory impairment, muscle tension, nausea, obsessions, palpitations, panic, psychomotor agitation, psychomotor retardation, restlessness, shortness of breath, suicidal  "ideas, suicidal planning, thoughts of death, weight gain and weight loss.   Severity: interfering with daily activities   Sleep quality: fair  Nighttime awakenings: several ()  Risk factors: previous episode of depression  Treatment tried: SSRI       Past Medical History:  has a past medical history of Anxiety disorder, unspecified, Asthma, H. pylori infection, and Vitamin D deficiency.    Social History:  reports that she has been smoking cigarettes. She has been smoking an average of .5 packs per day. She has never used smokeless tobacco. She reports that she does not currently use alcohol. She reports that she does not currently use drugs after having used the following drugs: Marijuana.    Care Team: Patient Care Team:  DEREJE Oliver as PCP - General (Family Medicine)  Davide Wheatley as OB/GYN     Current Medications:  Current Outpatient Medications   Medication Instructions    iron-vitamin C 100-250 mg, ICAR-C, 100-250 mg Tab 1 tablet, Oral, 2 times daily       Review of Systems   Constitutional:  Positive for irritability. Negative for weight gain and weight loss.   Respiratory:  Negative for choking and shortness of breath.    Cardiovascular:  Negative for palpitations.   Genitourinary:  Negative for impotence.   Psychiatric/Behavioral:  Positive for depression. Negative for confusion, decreased concentration and suicidal ideas. The patient is nervous/anxious. The patient does not have insomnia.       Visit Vitals  /60 (BP Location: Right arm)   Pulse 65   Temp 97.7 °F (36.5 °C) (Oral)   Ht 5' 4" (1.626 m)   Wt 56.4 kg (124 lb 6.4 oz)   LMP 2022 (Approximate)   SpO2 99%   Breastfeeding No   BMI 21.35 kg/m²       Physical Exam  Vitals and nursing note reviewed.   Constitutional:       General: She is not in acute distress.     Appearance: Normal appearance.   Cardiovascular:      Rate and Rhythm: Normal rate and regular rhythm.      Pulses: Normal pulses.      Heart sounds: No murmur " heard.  Pulmonary:      Effort: Pulmonary effort is normal.      Breath sounds: No wheezing or rhonchi.   Musculoskeletal:         General: No swelling, tenderness or deformity.   Skin:     General: Skin is warm and dry.      Coloration: Skin is not jaundiced.      Findings: No rash.      Comments: Multiple tattoos   Neurological:      Mental Status: She is alert and oriented to person, place, and time.      Cranial Nerves: No cranial nerve deficit.   Psychiatric:         Mood and Affect: Mood normal.         Behavior: Behavior normal.          Assessment & Plan:  1. Mild depression  Assessment & Plan:  Restart fluoxetine at 20mg daily (took 40mg in past with success). Encouraged relaxation techniques such as yoga and deep breathing; daily exercise; and avoiding caffeine, alcohol and stimulants. Educated patient on the risks of serotonin based medications. Common side effects include nausea, GI upset, headache, dizziness. Educated on serotonin syndrome and on the need to call office or seek ER treatment if develops suicidal ideation and plan. Advised patient that benefits of the medication may not be noticeable for up to 6 weeks from start date. Patient states understanding, will consider counseling if no improvement      Orders:  -     FLUoxetine 20 MG capsule; Take 1 capsule (20 mg total) by mouth once daily.  Dispense: 30 capsule; Refill: 2    2. Anxiety  Assessment & Plan:  Restart buspar 5mg BID (tolerated well in past in combination with fluxoetine 40mg daily )    Orders:  -     FLUoxetine 20 MG capsule; Take 1 capsule (20 mg total) by mouth once daily.  Dispense: 30 capsule; Refill: 2  -     busPIRone (BUSPAR) 5 MG Tab; Take 1 tablet (5 mg total) by mouth 2 (two) times daily.  Dispense: 60 tablet; Refill: 1    3. Positive depression screening  Comments:  I have reviewed the positive depression score which warrants active treatment with psychotherapy and/or medications.     I have used clinical judgement based  on duration and functional status to consider definite necessity for treatment.    Future Appointments   Date Time Provider Department Center   6/28/2023  2:20 PM Davide Hood MD [facility] [place] [facility]   8/23/2023  8:20 AM LAB, [facility] LABORATORY DRAW STATION [facility] DOROTHY [facility]   8/30/2023  2:00 PM DEREJE Oliver [facility] Jenniffer [facility]       Follow up for 1 month f/u depression/anxiety. Call sooner if needed.    DEREJE OLIVER    Lab Frequency Next Occurrence   CBC Auto Differential Once 07/26/2023   Comprehensive Metabolic Panel Once 07/26/2023   Lipid Panel Once 07/26/2023   TSH Once 07/26/2023   Hemoglobin A1C Once 07/26/2023   Vitamin D Once 07/26/2023   Fetal non-stress test- Future Once 05/10/2023

## 2023-06-08 NOTE — ASSESSMENT & PLAN NOTE
Restart fluoxetine at 20mg daily (took 40mg in past with success). Encouraged relaxation techniques such as yoga and deep breathing; daily exercise; and avoiding caffeine, alcohol and stimulants. Educated patient on the risks of serotonin based medications. Common side effects include nausea, GI upset, headache, dizziness. Educated on serotonin syndrome and on the need to call office or seek ER treatment if develops suicidal ideation and plan. Advised patient that benefits of the medication may not be noticeable for up to 6 weeks from start date. Patient states understanding, will consider counseling if no improvement

## 2023-06-22 NOTE — PROGRESS NOTES
"Subjective:       Jamia Smith is a 27 y.o. female  status post CS (23) at 39w1d presents for her 6 week postpartum visit. Denies any complaints. Infant doing well, bottle feeding. Mood is good. Reports restarted Buspar and Fluoxetine with PCP for anxiety and depression, doing well, controlled with medication. Denies SI, HI.     The patient's history was reviewed and updated.      OB History    Para Term  AB Living   1 1 1     1   SAB IAB Ectopic Multiple Live Births         0 1      # Outcome Date GA Lbr Meir/2nd Weight Sex Delivery Anes PTL Lv   1 Term 23 39w1d  3.186 kg (7 lb 0.4 oz) M CS-LTranv EPI N THELMA      Complications: Fetal Intolerance, Failure to Progress in First Stage         Review of Systems  General/Constitutional: Chills denies. Fatigue/weakness denies. Fever denies . Night sweats denies . Hot flashes denies  Gastrointestinal: Abdominal pain denies. Blood in stool denies. Constipation denies. Diarrhea denies. Heartburn denies. Nausea denies. Vomiting denies   Genitourinary: Incontinence denies. Blood in urine denies. Frequent urination denies.  Urgency denies. Painful urination denies. Nocturia denies   Gynecologic: Irregular menses denies.  Heavy bleeding  denies.  Painful menses denies.  Vaginal discharge denies. Vaginal odor denies. Vaginal itching/Irritation denies. Vaginal lesion denies.  Pelvic pain denies. Decreased libido denies. Vulvar lesion denies. Prolapse of genital organs denies. Painful intercourse denies. Postcoital bleeding denies   Psychiatric: Mood lability denies.  Depressed mood denies. Suicidal thoughts denies. Anxiety denies.  Overwhelmed denies.  Appetite normal. Energy level normal       Physical Exam:   /74 (BP Location: Right arm, Patient Position: Sitting)   Ht 5' 4" (1.626 m)   Wt 54.9 kg (121 lb)   LMP 2022 (Approximate) Comment: bleeding since delivery  Breastfeeding No   BMI 20.77 kg/m²      Chaperone present.    "   Constitutional: General appearance: healthy, well-nourished and well-developed   Psychiatric: Orientation to time, place and person. Normal mood and affect and active, alert   Breast: Inspection/palpation: no tenderness, masses, skin changes or abnormal secretions   Abdomen:      Auscultation/Inspection/Palpation: No tenderness or masses. Soft, nondistended      Inspection of incision site: well healed, clean, dry, intact and non-tender       Hernia: no palpable hernia     Female Genitalia:      Vulva: deferred per pt    Assessment:     1. Postpartum exam    2. Anxiety    3. Depression, unspecified depression type           Plan:     May return to normal activities  Healthy diet and exercise  declines contraception at this time    Cont Buspar and Fluoxetine as directed, keep F/U with PCP   Depression, anxiety precautions     RTC PRN/Annual

## 2023-06-28 ENCOUNTER — POSTPARTUM VISIT (OUTPATIENT)
Dept: OBSTETRICS AND GYNECOLOGY | Facility: CLINIC | Age: 28
End: 2023-06-28
Payer: MEDICAID

## 2023-06-28 VITALS
HEIGHT: 64 IN | BODY MASS INDEX: 20.66 KG/M2 | SYSTOLIC BLOOD PRESSURE: 116 MMHG | DIASTOLIC BLOOD PRESSURE: 74 MMHG | WEIGHT: 121 LBS

## 2023-06-28 DIAGNOSIS — F41.9 ANXIETY: ICD-10-CM

## 2023-06-28 DIAGNOSIS — F32.A DEPRESSION, UNSPECIFIED DEPRESSION TYPE: ICD-10-CM

## 2023-06-28 PROCEDURE — 59430 PR CARE AFTER DELIVERY ONLY: ICD-10-PCS | Mod: ,,, | Performed by: OBSTETRICS & GYNECOLOGY

## 2023-08-24 ENCOUNTER — TELEPHONE (OUTPATIENT)
Dept: FAMILY MEDICINE | Facility: CLINIC | Age: 28
End: 2023-08-24

## 2023-08-24 ENCOUNTER — OFFICE VISIT (OUTPATIENT)
Dept: FAMILY MEDICINE | Facility: CLINIC | Age: 28
End: 2023-08-24
Payer: MEDICAID

## 2023-08-24 VITALS
DIASTOLIC BLOOD PRESSURE: 72 MMHG | HEIGHT: 64 IN | HEART RATE: 67 BPM | SYSTOLIC BLOOD PRESSURE: 102 MMHG | OXYGEN SATURATION: 99 % | TEMPERATURE: 98 F | BODY MASS INDEX: 19.4 KG/M2 | WEIGHT: 113.63 LBS

## 2023-08-24 DIAGNOSIS — M54.2 NECK PAIN ON LEFT SIDE: Primary | ICD-10-CM

## 2023-08-24 PROCEDURE — 1160F PR REVIEW ALL MEDS BY PRESCRIBER/CLIN PHARMACIST DOCUMENTED: ICD-10-PCS | Mod: CPTII,,, | Performed by: NURSE PRACTITIONER

## 2023-08-24 PROCEDURE — 3008F BODY MASS INDEX DOCD: CPT | Mod: CPTII,,, | Performed by: NURSE PRACTITIONER

## 2023-08-24 PROCEDURE — 3078F PR MOST RECENT DIASTOLIC BLOOD PRESSURE < 80 MM HG: ICD-10-PCS | Mod: CPTII,,, | Performed by: NURSE PRACTITIONER

## 2023-08-24 PROCEDURE — 3078F DIAST BP <80 MM HG: CPT | Mod: CPTII,,, | Performed by: NURSE PRACTITIONER

## 2023-08-24 PROCEDURE — 1159F PR MEDICATION LIST DOCUMENTED IN MEDICAL RECORD: ICD-10-PCS | Mod: CPTII,,, | Performed by: NURSE PRACTITIONER

## 2023-08-24 PROCEDURE — 99214 PR OFFICE/OUTPT VISIT, EST, LEVL IV, 30-39 MIN: ICD-10-PCS | Mod: ,,, | Performed by: NURSE PRACTITIONER

## 2023-08-24 PROCEDURE — 1160F RVW MEDS BY RX/DR IN RCRD: CPT | Mod: CPTII,,, | Performed by: NURSE PRACTITIONER

## 2023-08-24 PROCEDURE — 1159F MED LIST DOCD IN RCRD: CPT | Mod: CPTII,,, | Performed by: NURSE PRACTITIONER

## 2023-08-24 PROCEDURE — 3074F PR MOST RECENT SYSTOLIC BLOOD PRESSURE < 130 MM HG: ICD-10-PCS | Mod: CPTII,,, | Performed by: NURSE PRACTITIONER

## 2023-08-24 PROCEDURE — 3008F PR BODY MASS INDEX (BMI) DOCUMENTED: ICD-10-PCS | Mod: CPTII,,, | Performed by: NURSE PRACTITIONER

## 2023-08-24 PROCEDURE — 3074F SYST BP LT 130 MM HG: CPT | Mod: CPTII,,, | Performed by: NURSE PRACTITIONER

## 2023-08-24 PROCEDURE — 99214 OFFICE O/P EST MOD 30 MIN: CPT | Mod: ,,, | Performed by: NURSE PRACTITIONER

## 2023-08-24 RX ORDER — MELOXICAM 15 MG/1
15 TABLET ORAL DAILY
Qty: 15 TABLET | Refills: 0 | Status: SHIPPED | OUTPATIENT
Start: 2023-08-24 | End: 2023-09-11

## 2023-08-24 RX ORDER — TIZANIDINE 4 MG/1
4 TABLET ORAL EVERY 8 HOURS PRN
Qty: 15 TABLET | Refills: 0 | Status: SHIPPED | OUTPATIENT
Start: 2023-08-24 | End: 2023-09-03

## 2023-08-24 NOTE — PROGRESS NOTES
Notify patient that xray results are normal. No further orders. Keep next appointment as scheduled.

## 2023-08-24 NOTE — TELEPHONE ENCOUNTER
----- Message from DEREJE Oliver sent at 8/24/2023  1:06 PM CDT -----  Notify patient that xray results are normal. No further orders. Keep next appointment as scheduled.

## 2023-08-24 NOTE — ASSESSMENT & PLAN NOTE
Educated on need to avoid taking more than one NSAID at a time. May take 2 extra strength Tylenol TID as needed for pain. Educated that muscle relaxants can cause drowsiness. Patient states understanding. Will notify of xray results when available. Handouts for stretching exercises provided.consider pt if no improvement in symptoms
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
## 2023-09-11 ENCOUNTER — OFFICE VISIT (OUTPATIENT)
Dept: FAMILY MEDICINE | Facility: CLINIC | Age: 28
End: 2023-09-11
Payer: MEDICAID

## 2023-09-11 VITALS
SYSTOLIC BLOOD PRESSURE: 112 MMHG | WEIGHT: 114 LBS | TEMPERATURE: 97 F | BODY MASS INDEX: 19.46 KG/M2 | HEART RATE: 85 BPM | HEIGHT: 64 IN | DIASTOLIC BLOOD PRESSURE: 68 MMHG | OXYGEN SATURATION: 99 %

## 2023-09-11 DIAGNOSIS — J06.9 ACUTE UPPER RESPIRATORY INFECTION: Primary | ICD-10-CM

## 2023-09-11 DIAGNOSIS — R05.9 COUGH, UNSPECIFIED TYPE: ICD-10-CM

## 2023-09-11 DIAGNOSIS — R51.9 NONINTRACTABLE HEADACHE, UNSPECIFIED CHRONICITY PATTERN, UNSPECIFIED HEADACHE TYPE: ICD-10-CM

## 2023-09-11 DIAGNOSIS — B07.0 PLANTAR WART OF RIGHT FOOT: ICD-10-CM

## 2023-09-11 LAB
CTP QC/QA: YES
CTP QC/QA: YES
FLUAV AG NPH QL: NEGATIVE
FLUBV AG NPH QL: NEGATIVE
SARS-COV-2 AG RESP QL IA.RAPID: NEGATIVE

## 2023-09-11 PROCEDURE — 99214 PR OFFICE/OUTPT VISIT, EST, LEVL IV, 30-39 MIN: ICD-10-PCS | Mod: ,,, | Performed by: NURSE PRACTITIONER

## 2023-09-11 PROCEDURE — 1160F RVW MEDS BY RX/DR IN RCRD: CPT | Mod: CPTII,,, | Performed by: NURSE PRACTITIONER

## 2023-09-11 PROCEDURE — 87400 POCT INFLUENZA A/B: ICD-10-PCS | Mod: QW,,, | Performed by: NURSE PRACTITIONER

## 2023-09-11 PROCEDURE — 1159F PR MEDICATION LIST DOCUMENTED IN MEDICAL RECORD: ICD-10-PCS | Mod: CPTII,,, | Performed by: NURSE PRACTITIONER

## 2023-09-11 PROCEDURE — 3074F SYST BP LT 130 MM HG: CPT | Mod: CPTII,,, | Performed by: NURSE PRACTITIONER

## 2023-09-11 PROCEDURE — 3074F PR MOST RECENT SYSTOLIC BLOOD PRESSURE < 130 MM HG: ICD-10-PCS | Mod: CPTII,,, | Performed by: NURSE PRACTITIONER

## 2023-09-11 PROCEDURE — 99214 OFFICE O/P EST MOD 30 MIN: CPT | Mod: ,,, | Performed by: NURSE PRACTITIONER

## 2023-09-11 PROCEDURE — 3008F BODY MASS INDEX DOCD: CPT | Mod: CPTII,,, | Performed by: NURSE PRACTITIONER

## 2023-09-11 PROCEDURE — 1160F PR REVIEW ALL MEDS BY PRESCRIBER/CLIN PHARMACIST DOCUMENTED: ICD-10-PCS | Mod: CPTII,,, | Performed by: NURSE PRACTITIONER

## 2023-09-11 PROCEDURE — 87811 SARS-COV-2 COVID19 W/OPTIC: CPT | Mod: QW,,, | Performed by: NURSE PRACTITIONER

## 2023-09-11 PROCEDURE — 87811 SARS CORONAVIRUS 2 ANTIGEN POCT, MANUAL READ: ICD-10-PCS | Mod: QW,,, | Performed by: NURSE PRACTITIONER

## 2023-09-11 PROCEDURE — 3078F DIAST BP <80 MM HG: CPT | Mod: CPTII,,, | Performed by: NURSE PRACTITIONER

## 2023-09-11 PROCEDURE — 1159F MED LIST DOCD IN RCRD: CPT | Mod: CPTII,,, | Performed by: NURSE PRACTITIONER

## 2023-09-11 PROCEDURE — 3078F PR MOST RECENT DIASTOLIC BLOOD PRESSURE < 80 MM HG: ICD-10-PCS | Mod: CPTII,,, | Performed by: NURSE PRACTITIONER

## 2023-09-11 PROCEDURE — 3008F PR BODY MASS INDEX (BMI) DOCUMENTED: ICD-10-PCS | Mod: CPTII,,, | Performed by: NURSE PRACTITIONER

## 2023-09-11 PROCEDURE — 87400 INFLUENZA A/B EACH AG IA: CPT | Mod: QW,,, | Performed by: NURSE PRACTITIONER

## 2023-09-11 RX ORDER — FLUTICASONE PROPIONATE 50 MCG
1 SPRAY, SUSPENSION (ML) NASAL 2 TIMES DAILY PRN
Qty: 15.8 ML | Refills: 0 | Status: SHIPPED | OUTPATIENT
Start: 2023-09-11 | End: 2023-11-13

## 2023-09-11 RX ORDER — CETIRIZINE HYDROCHLORIDE, PSEUDOEPHEDRINE HYDROCHLORIDE 5; 120 MG/1; MG/1
1 TABLET, FILM COATED, EXTENDED RELEASE ORAL 2 TIMES DAILY PRN
Qty: 14 TABLET | Refills: 0 | Status: SHIPPED | OUTPATIENT
Start: 2023-09-11 | End: 2023-09-20 | Stop reason: ALTCHOICE

## 2023-09-11 NOTE — ASSESSMENT & PLAN NOTE
Discussed viral vs bacterial infections. Educated on proper technique for nasal sprays and to notify if symptoms worsen or fail to improve over next week or develop temperature greater than 101.Will call out antibiotics. Discussed symptomatic treatment with OTC medications. Encouraged rest and hydration. Patient states understanding.

## 2023-09-11 NOTE — PROGRESS NOTES
Patient ID: Jamia Smith  : 1995     Chief Complaint: Cough (Cough, headache last 2 days)    Allergies: Patient is allergic to iodinated contrast media, iodine, and shrimp.     History of Present Illness:  The patient is a 27 y.o. White female who presents to clinic for evaluation and management with a chief complaint of Cough (Cough, headache last 2 days)   Patient requesting new referral to have plantar wart removed from right heel. Had it done a few years ago and lesion has returned, tenderness present.     Cough  This is a new problem. Episode onset: 2 days ago. The problem has been unchanged. The problem occurs every few minutes. The cough is Non-productive. Associated symptoms include headaches, nasal congestion, postnasal drip, rhinorrhea and a sore throat. Pertinent negatives include no chest pain, chills, ear congestion, ear pain, fever, heartburn, hemoptysis, myalgias, rash, shortness of breath, sweats, weight loss or wheezing. Her past medical history is significant for asthma.        Past Medical History:  has a past medical history of Anxiety disorder, unspecified, Asthma, H. pylori infection, and Vitamin D deficiency.    Social History:  reports that she has been smoking cigarettes. She has never used smokeless tobacco. She reports that she does not currently use alcohol. She reports that she does not currently use drugs after having used the following drugs: Marijuana.    Care Team: Patient Care Team:  Yann Norris APRN as PCP - General (Family Medicine)  Davide Wheatley as OB/GYN     Current Medications:  Current Outpatient Medications   Medication Instructions    cetirizine-pseudoephedrine 5-120 mg Tb12 1 tablet, Oral, 2 times daily PRN    fluticasone propionate (FLONASE) 50 mcg, Each Nostril, 2 times daily PRN       Review of Systems   Constitutional:  Negative for chills, fever and weight loss.   HENT:  Positive for postnasal drip, rhinorrhea and sore throat. Negative for ear pain.   "  Respiratory:  Positive for cough. Negative for hemoptysis, shortness of breath and wheezing.    Cardiovascular:  Negative for chest pain.   Gastrointestinal:  Negative for heartburn.   Musculoskeletal:  Negative for myalgias.   Integumentary:  Negative for rash.   Neurological:  Positive for headaches.        Visit Vitals  /68 (BP Location: Left arm)   Pulse 85   Temp 97.2 °F (36.2 °C) (Temporal)   Ht 5' 4" (1.626 m)   Wt 51.7 kg (114 lb)   LMP 09/10/2023   SpO2 99%   BMI 19.57 kg/m²       Physical Exam  Vitals and nursing note reviewed.   Constitutional:       General: She is not in acute distress.     Appearance: Normal appearance.   HENT:      Head: Normocephalic and atraumatic.      Right Ear: Tympanic membrane, ear canal and external ear normal.      Left Ear: Tympanic membrane, ear canal and external ear normal.      Nose: Rhinorrhea present. Rhinorrhea is clear.      Mouth/Throat:      Mouth: Mucous membranes are moist.      Pharynx: Oropharynx is clear. No oropharyngeal exudate or posterior oropharyngeal erythema.   Cardiovascular:      Rate and Rhythm: Normal rate and regular rhythm.      Pulses: Normal pulses.      Heart sounds: No murmur heard.  Pulmonary:      Effort: Pulmonary effort is normal.      Breath sounds: No wheezing or rhonchi.   Skin:     General: Skin is warm and dry.      Coloration: Skin is not jaundiced.      Findings: Lesion (tender firm lesion to plantar surface right heel, no erythema) present. No rash.      Comments: Multiple tattoos   Neurological:      Mental Status: She is alert and oriented to person, place, and time.      Cranial Nerves: No cranial nerve deficit.   Psychiatric:         Mood and Affect: Mood normal.         Behavior: Behavior normal.          Assessment & Plan:  1. Acute upper respiratory infection  Assessment & Plan:  Discussed viral vs bacterial infections. Educated on proper technique for nasal sprays and to notify if symptoms worsen or fail to improve " over next week or develop temperature greater than 101.Will call out antibiotics. Discussed symptomatic treatment with OTC medications. Encouraged rest and hydration. Patient states understanding.       Orders:  -     fluticasone propionate (FLONASE) 50 mcg/actuation nasal spray; 1 spray (50 mcg total) by Each Nostril route 2 (two) times daily as needed for Rhinitis.  Dispense: 15.8 mL; Refill: 0  -     cetirizine-pseudoephedrine 5-120 mg Tb12; Take 1 tablet by mouth 2 (two) times daily as needed (congestion).  Dispense: 14 tablet; Refill: 0    2. Nonintractable headache, unspecified chronicity pattern, unspecified headache type  -     POCT Influenza A/B Rapid Antigen  -     SARS Coronavirus 2 Antigen, POCT Manual Read    3. Cough, unspecified type  -     POCT Influenza A/B Rapid Antigen  -     SARS Coronavirus 2 Antigen, POCT Manual Read    4. Plantar wart of right foot  Assessment & Plan:  Refer for removal per patient request.     Orders:  -     Ambulatory referral/consult to General Surgery; Future; Expected date: 09/18/2023         Future Appointments   Date Time Provider Department Center   12/1/2023  8:40 AM LAB, [facility] LABORATORY DRAW STATION [facility]   12/4/2023  9:00 AM Yann Norris APRN [facility]       Follow up if symptoms worsen or fail to improve, for Keep Apt as Scheduled. Call sooner if needed.    DEREJE SOARES    Lab Frequency Next Occurrence   CBC Auto Differential Once 07/26/2023   Comprehensive Metabolic Panel Once 07/26/2023   Lipid Panel Once 07/26/2023   TSH Once 07/26/2023   Hemoglobin A1C Once 07/26/2023   Vitamin D Once 07/26/2023   Fetal non-stress test- Future Once 05/10/2023

## 2023-09-20 ENCOUNTER — OFFICE VISIT (OUTPATIENT)
Dept: FAMILY MEDICINE | Facility: CLINIC | Age: 28
End: 2023-09-20
Payer: MEDICAID

## 2023-09-20 VITALS
DIASTOLIC BLOOD PRESSURE: 60 MMHG | BODY MASS INDEX: 19.81 KG/M2 | OXYGEN SATURATION: 97 % | HEART RATE: 75 BPM | TEMPERATURE: 97 F | WEIGHT: 116 LBS | SYSTOLIC BLOOD PRESSURE: 112 MMHG | HEIGHT: 64 IN

## 2023-09-20 DIAGNOSIS — L29.9 ITCHING: ICD-10-CM

## 2023-09-20 DIAGNOSIS — B86 SCABIES: Primary | ICD-10-CM

## 2023-09-20 PROCEDURE — 3074F PR MOST RECENT SYSTOLIC BLOOD PRESSURE < 130 MM HG: ICD-10-PCS | Mod: CPTII,,, | Performed by: NURSE PRACTITIONER

## 2023-09-20 PROCEDURE — 1159F PR MEDICATION LIST DOCUMENTED IN MEDICAL RECORD: ICD-10-PCS | Mod: CPTII,,, | Performed by: NURSE PRACTITIONER

## 2023-09-20 PROCEDURE — 1160F RVW MEDS BY RX/DR IN RCRD: CPT | Mod: CPTII,,, | Performed by: NURSE PRACTITIONER

## 2023-09-20 PROCEDURE — 99213 OFFICE O/P EST LOW 20 MIN: CPT | Mod: ,,, | Performed by: NURSE PRACTITIONER

## 2023-09-20 PROCEDURE — 1159F MED LIST DOCD IN RCRD: CPT | Mod: CPTII,,, | Performed by: NURSE PRACTITIONER

## 2023-09-20 PROCEDURE — 3078F DIAST BP <80 MM HG: CPT | Mod: CPTII,,, | Performed by: NURSE PRACTITIONER

## 2023-09-20 PROCEDURE — 99213 PR OFFICE/OUTPT VISIT, EST, LEVL III, 20-29 MIN: ICD-10-PCS | Mod: ,,, | Performed by: NURSE PRACTITIONER

## 2023-09-20 PROCEDURE — 3074F SYST BP LT 130 MM HG: CPT | Mod: CPTII,,, | Performed by: NURSE PRACTITIONER

## 2023-09-20 PROCEDURE — 3078F PR MOST RECENT DIASTOLIC BLOOD PRESSURE < 80 MM HG: ICD-10-PCS | Mod: CPTII,,, | Performed by: NURSE PRACTITIONER

## 2023-09-20 PROCEDURE — 1160F PR REVIEW ALL MEDS BY PRESCRIBER/CLIN PHARMACIST DOCUMENTED: ICD-10-PCS | Mod: CPTII,,, | Performed by: NURSE PRACTITIONER

## 2023-09-20 PROCEDURE — 3008F PR BODY MASS INDEX (BMI) DOCUMENTED: ICD-10-PCS | Mod: CPTII,,, | Performed by: NURSE PRACTITIONER

## 2023-09-20 PROCEDURE — 3008F BODY MASS INDEX DOCD: CPT | Mod: CPTII,,, | Performed by: NURSE PRACTITIONER

## 2023-09-20 RX ORDER — HYDROXYZINE PAMOATE 25 MG/1
25 CAPSULE ORAL EVERY 6 HOURS PRN
Qty: 20 CAPSULE | Refills: 0 | Status: SHIPPED | OUTPATIENT
Start: 2023-09-20 | End: 2023-11-13

## 2023-09-20 RX ORDER — PERMETHRIN 50 MG/G
CREAM TOPICAL ONCE
Qty: 60 G | Refills: 0 | Status: SHIPPED | OUTPATIENT
Start: 2023-09-20 | End: 2023-09-20

## 2023-09-20 NOTE — PROGRESS NOTES
Patient ID: Jamia Smith  : 1995     Chief Complaint: Rash (Rash for a few days on legs and stomach)    Allergies: Patient is allergic to iodinated contrast media, iodine, and shrimp.     History of Present Illness:  The patient is a 27 y.o. White female who presents to clinic for evaluation and management with a chief complaint of Rash (Rash for a few days on legs and stomach)   Patient reports bites, generalized, with itching. Denies any drainage. Reports itching worse at nighttime. Denies any bites to face or neck. Denies anyone else in household with symptoms. Denies any new detergents or soaps.     Rash  This is a new problem. The current episode started in the past 7 days. The problem has been waxing and waning since onset. The affected locations include the chest, torso, back, left lower leg, left upper leg, right lower leg and right upper leg. The rash is characterized by itchiness and redness. She was exposed to nothing. Pertinent negatives include no anorexia, congestion, cough, diarrhea, eye pain, facial edema, fatigue, fever, joint pain, nail changes, rhinorrhea, shortness of breath, sore throat or vomiting. Past treatments include nothing.        Past Medical History:  has a past medical history of Anxiety disorder, unspecified, Asthma, H. pylori infection, and Vitamin D deficiency.    Social History:  reports that she has been smoking cigarettes. She has never used smokeless tobacco. She reports that she does not currently use alcohol. She reports that she does not currently use drugs after having used the following drugs: Marijuana.    Care Team: Patient Care Team:  Yann Norris APRN as PCP - General (Family Medicine)  Davide Wheatley as OB/GYN     Current Medications:  Current Outpatient Medications   Medication Instructions    fluticasone propionate (FLONASE) 50 mcg, Each Nostril, 2 times daily PRN    hydrOXYzine pamoate (VISTARIL) 25 mg, Oral, Every 6 hours PRN    permethrin (ELIMITE) 5  "% cream Topical (Top), Once, Appply from head to toe, Leave in place for 8-14 hours before rinsing       Review of Systems   Constitutional:  Negative for fatigue and fever.   HENT:  Negative for nasal congestion, rhinorrhea and sore throat.    Eyes:  Negative for pain.   Respiratory:  Negative for cough and shortness of breath.    Gastrointestinal:  Negative for anorexia, diarrhea and vomiting.   Musculoskeletal:  Negative for joint pain.   Integumentary:  Positive for rash. Negative for nail changes.        Visit Vitals  /60 (BP Location: Right arm)   Pulse 75   Temp 97.3 °F (36.3 °C) (Temporal)   Ht 5' 4" (1.626 m)   Wt 52.6 kg (116 lb)   LMP 09/10/2023   SpO2 97%   BMI 19.91 kg/m²       Physical Exam  Vitals and nursing note reviewed.   Constitutional:       General: She is not in acute distress.     Appearance: Normal appearance.   HENT:      Head: Normocephalic and atraumatic.   Pulmonary:      Effort: Pulmonary effort is normal.   Skin:     General: Skin is warm and dry.      Coloration: Skin is not jaundiced.      Findings: Rash (few scattered patches of  bites, worse to right ankle and left thigh near knee, few in linear pattern. few lesions to trunk) present.      Comments: Multiple tattoos   Neurological:      Mental Status: She is alert.      Cranial Nerves: No cranial nerve deficit.   Psychiatric:         Mood and Affect: Mood normal.          Assessment & Plan:  1. Scabies  Comments:  educated on household treatments and proper use of cream. hand outs provided.   Orders:  -     permethrin (ELIMITE) 5 % cream; Apply topically once. Appply from head to toe, Leave in place for 8-14 hours before rinsing for 1 dose  Dispense: 60 g; Refill: 0    2. Itching  -     hydrOXYzine pamoate (VISTARIL) 25 MG Cap; Take 1 capsule (25 mg total) by mouth every 6 (six) hours as needed (itching).  Dispense: 20 capsule; Refill: 0         Future Appointments   Date Time Provider Department Center   12/1/2023  8:40 AM " LAB, [facility] LABORATORY DRAW STATION [facility] D[facility]   12/4/2023  9:00 AM Yann Norris APRN [facility]       No follow-ups on file. Call sooner if needed.    DEREJE SOARES    Lab Frequency Next Occurrence   CBC Auto Differential Once 07/26/2023   Comprehensive Metabolic Panel Once 07/26/2023   Lipid Panel Once 07/26/2023   TSH Once 07/26/2023   Hemoglobin A1C Once 07/26/2023   Vitamin D Once 07/26/2023   Fetal non-stress test- Future Once 05/10/2023   Ambulatory referral/consult to General Surgery Once 09/18/2023

## 2023-10-01 ENCOUNTER — HOSPITAL ENCOUNTER (EMERGENCY)
Facility: HOSPITAL | Age: 28
Discharge: HOME | End: 2023-10-01
Payer: MEDICAID

## 2023-10-01 VITALS
SYSTOLIC BLOOD PRESSURE: 115 MMHG | TEMPERATURE: 98 F | WEIGHT: 116 LBS | RESPIRATION RATE: 18 BRPM | OXYGEN SATURATION: 99 % | BODY MASS INDEX: 19.81 KG/M2 | HEIGHT: 64 IN | HEART RATE: 86 BPM | DIASTOLIC BLOOD PRESSURE: 67 MMHG

## 2023-10-01 DIAGNOSIS — R10.84 GENERALIZED ABDOMINAL PAIN: Primary | ICD-10-CM

## 2023-10-01 DIAGNOSIS — N30.00 ACUTE CYSTITIS WITHOUT HEMATURIA: ICD-10-CM

## 2023-10-01 DIAGNOSIS — R10.9 ABDOMINAL PAIN: ICD-10-CM

## 2023-10-01 LAB
ALBUMIN SERPL-MCNC: 4 G/DL (ref 3.4–5)
ALBUMIN/GLOB SERPL: 1.7 RATIO
ALP SERPL-CCNC: 81 UNIT/L (ref 50–144)
ALT SERPL-CCNC: 16 UNIT/L (ref 1–45)
ANION GAP SERPL CALC-SCNC: 10 MEQ/L (ref 2–13)
APPEARANCE UR: CLEAR
AST SERPL-CCNC: 24 UNIT/L (ref 14–36)
B-HCG SERPL QL: NEGATIVE
BACTERIA #/AREA URNS AUTO: ABNORMAL /HPF
BASOPHILS # BLD AUTO: 0.08 X10(3)/MCL (ref 0.01–0.08)
BASOPHILS NFR BLD AUTO: 0.9 % (ref 0.1–1.2)
BILIRUB SERPL-MCNC: 0.3 MG/DL (ref 0–1)
BILIRUB UR QL STRIP.AUTO: NEGATIVE
BUN SERPL-MCNC: 13 MG/DL (ref 7–20)
CALCIUM SERPL-MCNC: 8.5 MG/DL (ref 8.4–10.2)
CHLORIDE SERPL-SCNC: 106 MMOL/L (ref 98–110)
CO2 SERPL-SCNC: 21 MMOL/L (ref 21–32)
COLOR UR AUTO: YELLOW
CREAT SERPL-MCNC: 0.74 MG/DL (ref 0.66–1.25)
CREAT/UREA NIT SERPL: 18 (ref 12–20)
EOSINOPHIL # BLD AUTO: 0.41 X10(3)/MCL (ref 0.04–0.36)
EOSINOPHIL NFR BLD AUTO: 4.8 % (ref 0.7–7)
ERYTHROCYTE [DISTWIDTH] IN BLOOD BY AUTOMATED COUNT: 14.4 % (ref 11–14.5)
GFR SERPLBLD CREATININE-BSD FMLA CKD-EPI: >90 MLS/MIN/1.73/M2
GLOBULIN SER-MCNC: 2.4 GM/DL (ref 2–3.9)
GLUCOSE SERPL-MCNC: 98 MG/DL (ref 70–115)
GLUCOSE UR QL STRIP.AUTO: NEGATIVE
HCT VFR BLD AUTO: 38.1 % (ref 36–48)
HGB BLD-MCNC: 12.5 G/DL (ref 11.8–16)
IMM GRANULOCYTES # BLD AUTO: 0.01 X10(3)/MCL (ref 0–0.03)
IMM GRANULOCYTES NFR BLD AUTO: 0.1 % (ref 0–0.5)
KETONES UR QL STRIP.AUTO: NEGATIVE
LEUKOCYTE ESTERASE UR QL STRIP.AUTO: ABNORMAL
LIPASE SERPL-CCNC: 137 U/L (ref 23–300)
LYMPHOCYTES # BLD AUTO: 2.84 X10(3)/MCL (ref 1.16–3.74)
LYMPHOCYTES NFR BLD AUTO: 33 % (ref 20–55)
MCH RBC QN AUTO: 28.7 PG (ref 27–34)
MCHC RBC AUTO-ENTMCNC: 32.8 G/DL (ref 31–37)
MCV RBC AUTO: 87.6 FL (ref 79–99)
MONOCYTES # BLD AUTO: 0.65 X10(3)/MCL (ref 0.24–0.36)
MONOCYTES NFR BLD AUTO: 7.6 % (ref 4.7–12.5)
NEUTROPHILS # BLD AUTO: 4.61 X10(3)/MCL (ref 1.56–6.13)
NEUTROPHILS NFR BLD AUTO: 53.6 % (ref 37–73)
NITRITE UR QL STRIP.AUTO: POSITIVE
NRBC BLD AUTO-RTO: 0 %
PH UR STRIP.AUTO: 6 [PH]
PLATELET # BLD AUTO: 307 X10(3)/MCL (ref 140–371)
PMV BLD AUTO: 9.9 FL (ref 9.4–12.4)
POTASSIUM SERPL-SCNC: 3.9 MMOL/L (ref 3.5–5.1)
PROT SERPL-MCNC: 6.4 GM/DL (ref 6.3–8.2)
PROT UR QL STRIP.AUTO: NEGATIVE
RBC # BLD AUTO: 4.35 X10(6)/MCL (ref 4–5.1)
RBC #/AREA URNS AUTO: ABNORMAL /HPF
RBC UR QL AUTO: NEGATIVE
SODIUM SERPL-SCNC: 137 MMOL/L (ref 135–145)
SP GR UR STRIP.AUTO: >=1.03 (ref 1–1.03)
SQUAMOUS #/AREA URNS AUTO: ABNORMAL /HPF
UROBILINOGEN UR STRIP-ACNC: 0.2
WBC # SPEC AUTO: 8.6 X10(3)/MCL (ref 4–11.5)
WBC #/AREA URNS AUTO: ABNORMAL /HPF

## 2023-10-01 PROCEDURE — 36415 COLL VENOUS BLD VENIPUNCTURE: CPT

## 2023-10-01 PROCEDURE — 80053 COMPREHEN METABOLIC PANEL: CPT

## 2023-10-01 PROCEDURE — 81025 URINE PREGNANCY TEST: CPT

## 2023-10-01 PROCEDURE — 63600175 PHARM REV CODE 636 W HCPCS

## 2023-10-01 PROCEDURE — 96372 THER/PROPH/DIAG INJ SC/IM: CPT

## 2023-10-01 PROCEDURE — 87077 CULTURE AEROBIC IDENTIFY: CPT

## 2023-10-01 PROCEDURE — 87088 URINE BACTERIA CULTURE: CPT

## 2023-10-01 PROCEDURE — 99284 EMERGENCY DEPT VISIT MOD MDM: CPT

## 2023-10-01 PROCEDURE — 83690 ASSAY OF LIPASE: CPT

## 2023-10-01 PROCEDURE — 25000003 PHARM REV CODE 250

## 2023-10-01 PROCEDURE — 85025 COMPLETE CBC W/AUTO DIFF WBC: CPT

## 2023-10-01 PROCEDURE — 81001 URINALYSIS AUTO W/SCOPE: CPT

## 2023-10-01 RX ORDER — DICYCLOMINE HYDROCHLORIDE 20 MG/1
20 TABLET ORAL
Status: COMPLETED | OUTPATIENT
Start: 2023-10-01 | End: 2023-10-01

## 2023-10-01 RX ORDER — NITROFURANTOIN 25; 75 MG/1; MG/1
100 CAPSULE ORAL 2 TIMES DAILY
Qty: 10 CAPSULE | Refills: 0 | Status: SHIPPED | OUTPATIENT
Start: 2023-10-01 | End: 2023-10-06

## 2023-10-01 RX ORDER — CEFTRIAXONE 1 G/1
1 INJECTION, POWDER, FOR SOLUTION INTRAMUSCULAR; INTRAVENOUS
Status: COMPLETED | OUTPATIENT
Start: 2023-10-01 | End: 2023-10-01

## 2023-10-01 RX ORDER — DICYCLOMINE HYDROCHLORIDE 20 MG/1
20 TABLET ORAL EVERY 6 HOURS PRN
Qty: 20 TABLET | Refills: 0 | Status: SHIPPED | OUTPATIENT
Start: 2023-10-01 | End: 2023-11-13

## 2023-10-01 RX ADMIN — CEFTRIAXONE SODIUM 1 G: 1 INJECTION, POWDER, FOR SOLUTION INTRAMUSCULAR; INTRAVENOUS at 09:10

## 2023-10-01 RX ADMIN — DICYCLOMINE HYDROCHLORIDE 20 MG: 20 TABLET ORAL at 09:10

## 2023-10-01 NOTE — Clinical Note
"Jamia"Yuliana Smith was seen and treated in our emergency department on 10/1/2023.  She may return to work on 10/03/2023.       If you have any questions or concerns, please don't hesitate to call.      Mp Ku MD"

## 2023-10-02 NOTE — ED PROVIDER NOTES
"Encounter Date: 10/1/2023       History     Chief Complaint   Patient presents with    Abdominal Pain    Nausea     STATES PERSISTENT GENERALIZED ABD PAIN W/ NAUSEA X 2 DAYS. STATES NO CHANGE IN BOWEL MOVEMENTS. PT DENIES VOMITING. HX OF H PYLORI. UNSURE ABOUT PREGNANCY       27-year-old female presents complaining of migratory abdominal pain.  She is had pain in the center of her abdomen, periumbilically, and moving through her right and left lower quadrants.  She denies any constipation or diarrhea.  She denies any fever or chills.  She is unsure of her pregnancy status.    The history is provided by the patient.     Review of patient's allergies indicates:   Allergen Reactions    Iodinated contrast media Shortness Of Breath    Iodine Rash and Swelling    Shrimp      Past Medical History:   Diagnosis Date    Anxiety disorder, unspecified     Asthma     H. pylori infection     Vitamin D deficiency      Past Surgical History:   Procedure Laterality Date     SECTION N/A 2023    Procedure:  SECTION;  Surgeon: Davide Hood MD;  Location: [facility];  Service: OB/GYN;  Laterality: N/A;    CHOLECYSTECTOMY       Family History   Problem Relation Age of Onset    No Known Problems Paternal Grandfather     No Known Problems Paternal Grandmother     No Known Problems Maternal Grandmother     No Known Problems Maternal Grandfather     No Known Problems Father     No Known Problems Mother     No Known Problems Brother     No Known Problems Sister      Social History     Tobacco Use    Smoking status: Every Day     Current packs/day: 0.50     Types: Cigarettes    Smokeless tobacco: Never    Tobacco comments:     3/21/23 per pt "I smoke  only 1-3 cigarettes a day."   Substance Use Topics    Alcohol use: Not Currently    Drug use: Not Currently     Types: Marijuana     Review of Systems   Constitutional:  Negative for fever.   HENT:  Negative for sore throat.    Respiratory:  Negative for shortness of breath.  "   Cardiovascular:  Negative for chest pain.   Gastrointestinal:  Positive for abdominal pain. Negative for nausea.        Crampy, migratory abdominal pains   Genitourinary:  Negative for dysuria.   Musculoskeletal:  Negative for back pain.   Skin:  Negative for rash.   Neurological:  Negative for weakness.   Hematological:  Does not bruise/bleed easily.   All other systems reviewed and are negative.      Physical Exam     Initial Vitals [10/01/23 2023]   BP Pulse Resp Temp SpO2   (!) 112/54 94 18 98.1 °F (36.7 °C) 99 %      MAP       --         Physical Exam    Nursing note and vitals reviewed.  Constitutional: Vital signs are normal. She appears well-developed and well-nourished. She is cooperative.   HENT:   Head: Normocephalic and atraumatic.   Eyes: Conjunctivae, EOM and lids are normal. Pupils are equal, round, and reactive to light.   Neck: Trachea normal. Neck supple.   Normal range of motion.  Cardiovascular:  Normal rate, regular rhythm, normal heart sounds and intact distal pulses.           Pulmonary/Chest: Breath sounds normal.   Abdominal: Abdomen is soft. Bowel sounds are normal. She exhibits no distension and no mass. There is no abdominal tenderness. There is no rebound and no guarding.   Musculoskeletal:         General: Normal range of motion.      Cervical back: Normal, normal range of motion and neck supple.      Lumbar back: Normal.     Neurological: She is alert and oriented to person, place, and time. She has normal strength. Coordination normal.   Skin: Skin is warm, dry and intact. Capillary refill takes less than 2 seconds.   Psychiatric: She has a normal mood and affect. Her speech is normal and behavior is normal. Judgment and thought content normal. Cognition and memory are normal.         ED Course   Procedures  Labs Reviewed   URINALYSIS - Abnormal; Notable for the following components:       Result Value    Nitrites, UA Positive (*)     Leukocyte Esterase, UA Small (*)     All other  components within normal limits    Narrative:      URINE STABILITY IS 2 HOURS AT ROOM TEMP OR    SIX HOURS REFRIGERATED. PERFORMING TESTING ON    SPECIMENS GREATER THAN THIS AGE MAY AFFECT THE    FOLLOWING TESTS:    PH          SPECIFIC GRAVITY           BLOOD    CLARITY     BILIRUBIN               UROBILINOGEN   CBC WITH DIFFERENTIAL - Abnormal; Notable for the following components:    Mono # 0.65 (*)     Eos # 0.41 (*)     All other components within normal limits   URINALYSIS, MICROSCOPIC - Abnormal; Notable for the following components:    Bacteria, UA 4+ (*)     WBC, UA 6-10 (*)     All other components within normal limits   HCG QUALITATIVE URINE - Normal   LIPASE - Normal   CULTURE, URINE   CBC W/ AUTO DIFFERENTIAL    Narrative:     The following orders were created for panel order CBC auto differential.  Procedure                               Abnormality         Status                     ---------                               -----------         ------                     CBC with Differential[[id number]]       Abnormal            Final result                 Please view results for these tests on the individual orders.   COMPREHENSIVE METABOLIC PANEL          Imaging Results              X-Ray Abdomen AP 1 View (KUB) (Preliminary result)  Result time 10/01/23 21:37:23      Wet Read by Mp Ku MD (10/01/23 21:37:23, [facility])    Nonspecific bowel gas pattern, no obstruction                                     Medications   cefTRIAXone injection 1 g (has no administration in time range)   dicyclomine capsule 20 mg (has no administration in time range)     Medical Decision Making  Crampy, migratory abdominal pains.  Normal exam.  Labs show evidence of UTI.  Differential diagnosis:  Constipation, intestinal colic, IBS, UTI  Rocephin IM  KUB      Amount and/or Complexity of Data Reviewed  Labs: ordered.  Radiology: ordered and independent  interpretation performed.    Risk  Prescription drug management.                               Clinical Impression:   Final diagnoses:  [R10.9] Abdominal pain  [R10.84] Generalized abdominal pain (Primary)  [N30.00] Acute cystitis without hematuria        ED Disposition Condition    Discharge Good          ED Prescriptions       Medication Sig Dispense Start Date End Date Auth. Provider    dicyclomine (BENTYL) 20 mg tablet Take 1 tablet (20 mg total) by mouth every 6 (six) hours as needed (Abdominal Pain). 20 tablet 10/1/2023 -- Mp Ku MD    nitrofurantoin, macrocrystal-monohydrate, (MACROBID) 100 MG capsule Take 1 capsule (100 mg total) by mouth 2 (two) times daily. for 5 days 10 capsule 10/1/2023 10/6/2023 Mp Ku MD          Follow-up Information    None          Mp Ku MD  10/01/23 9779

## 2023-10-04 LAB — BACTERIA UR CULT: ABNORMAL

## 2023-10-11 NOTE — PROGRESS NOTES
Chief Complaint     Oligomenorrhea (LMP 23. +UPT)    HPI:     Patient is a 27 y.o.  presents today for UPT confirmation. No c/o.     Last pap:  per pt WNL   No hx of STD's per pt  Currently still bleeding from delivery    Past Medical History:   Diagnosis Date    Anxiety disorder, unspecified     Asthma     H. pylori infection     Vitamin D deficiency        Past Surgical History:   Procedure Laterality Date     SECTION N/A 2023    Procedure:  SECTION;  Surgeon: Davide Hood MD;  Location: [facility];  Service: OB/GYN;  Laterality: N/A;    CHOLECYSTECTOMY         Family History   Problem Relation Age of Onset    No Known Problems Paternal Grandfather     No Known Problems Paternal Grandmother     No Known Problems Maternal Grandmother     No Known Problems Maternal Grandfather     No Known Problems Father     No Known Problems Mother     No Known Problems Brother     No Known Problems Sister        OB History          1    Para   1    Term   1            AB        Living   1         SAB        IAB        Ectopic        Multiple   0    Live Births   1                 Current Outpatient Medications on File Prior to Visit   Medication Sig Dispense Refill    dicyclomine (BENTYL) 20 mg tablet Take 1 tablet (20 mg total) by mouth every 6 (six) hours as needed (Abdominal Pain). 20 tablet 0    fluticasone propionate (FLONASE) 50 mcg/actuation nasal spray 1 spray (50 mcg total) by Each Nostril route 2 (two) times daily as needed for Rhinitis. 15.8 mL 0    hydrOXYzine pamoate (VISTARIL) 25 MG Cap Take 1 capsule (25 mg total) by mouth every 6 (six) hours as needed (itching). 20 capsule 0     No current facility-administered medications on file prior to visit.       Review of Systems:       Review of Systems   Constitutional:  Negative for chills and fever.   Gastrointestinal:  Negative for abdominal pain, constipation and diarrhea.   Genitourinary:  Negative for bladder  incontinence, decreased libido, dysmenorrhea, dyspareunia, dysuria, flank pain, frequency, genital sores, hematuria, hot flashes, menorrhagia, menstrual problem, pelvic pain, urgency, vaginal bleeding, vaginal discharge, vaginal pain, urinary incontinence, postcoital bleeding, postmenopausal bleeding, vaginal dryness and vaginal odor.        Physical Exam:    /64 (BP Location: Right arm, Patient Position: Sitting, BP Method: Medium (Manual))   Temp 97.7 °F (36.5 °C) (Temporal)   Wt 53.2 kg (117 lb 4.6 oz)   LMP 09/05/2023   BMI 20.13 kg/m²     Physical Exam     deferred  Assessment:   1. Positive pregnancy test    2. Tobacco use             Plan:         Prenatal counseling  Tobacco avoidance/cessation  Illicit drug avoidance  PNV  RTC 3 weeks for new OB visit

## 2023-10-17 ENCOUNTER — OFFICE VISIT (OUTPATIENT)
Dept: OBSTETRICS AND GYNECOLOGY | Facility: CLINIC | Age: 28
End: 2023-10-17
Payer: MEDICAID

## 2023-10-17 VITALS
TEMPERATURE: 98 F | SYSTOLIC BLOOD PRESSURE: 114 MMHG | WEIGHT: 117.31 LBS | DIASTOLIC BLOOD PRESSURE: 64 MMHG | BODY MASS INDEX: 20.13 KG/M2

## 2023-10-17 DIAGNOSIS — N91.5 OLIGOMENORRHEA, UNSPECIFIED TYPE: ICD-10-CM

## 2023-10-17 DIAGNOSIS — Z72.0 TOBACCO USE: ICD-10-CM

## 2023-10-17 DIAGNOSIS — Z32.01 POSITIVE PREGNANCY TEST: Primary | ICD-10-CM

## 2023-10-17 PROBLEM — O26.899 ABDOMINAL PAIN AFFECTING PREGNANCY, ANTEPARTUM: Status: RESOLVED | Noted: 2023-05-09 | Resolved: 2023-10-17

## 2023-10-17 PROBLEM — R10.9 ABDOMINAL PAIN AFFECTING PREGNANCY, ANTEPARTUM: Status: RESOLVED | Noted: 2023-05-09 | Resolved: 2023-10-17

## 2023-10-17 PROBLEM — F41.9 ANXIETY DURING PREGNANCY: Status: RESOLVED | Noted: 2023-03-21 | Resolved: 2023-10-17

## 2023-10-17 PROBLEM — N89.8 VAGINAL DISCHARGE DURING PREGNANCY IN SECOND TRIMESTER: Status: RESOLVED | Noted: 2023-02-22 | Resolved: 2023-10-17

## 2023-10-17 PROBLEM — O99.340 ANXIETY DURING PREGNANCY: Status: RESOLVED | Noted: 2023-03-21 | Resolved: 2023-10-17

## 2023-10-17 PROBLEM — O26.892 VAGINAL DISCHARGE DURING PREGNANCY IN SECOND TRIMESTER: Status: RESOLVED | Noted: 2023-02-22 | Resolved: 2023-10-17

## 2023-10-17 PROBLEM — O99.322 MATERNAL DRUG USE COMPLICATING PREGNANCY IN SECOND TRIMESTER, ANTEPARTUM: Status: RESOLVED | Noted: 2023-01-24 | Resolved: 2023-10-17

## 2023-10-17 PROBLEM — O99.019 ANEMIA OF PREGNANCY: Status: RESOLVED | Noted: 2023-05-15 | Resolved: 2023-10-17

## 2023-10-17 LAB
B-HCG UR QL: POSITIVE
CTP QC/QA: YES

## 2023-10-17 PROCEDURE — 3008F BODY MASS INDEX DOCD: CPT | Mod: CPTII,,, | Performed by: OBSTETRICS & GYNECOLOGY

## 2023-10-17 PROCEDURE — 99203 PR OFFICE/OUTPT VISIT, NEW, LEVL III, 30-44 MIN: ICD-10-PCS | Mod: TH,,, | Performed by: OBSTETRICS & GYNECOLOGY

## 2023-10-17 PROCEDURE — 81025 URINE PREGNANCY TEST: CPT | Mod: ,,, | Performed by: OBSTETRICS & GYNECOLOGY

## 2023-10-17 PROCEDURE — 1159F MED LIST DOCD IN RCRD: CPT | Mod: CPTII,,, | Performed by: OBSTETRICS & GYNECOLOGY

## 2023-10-17 PROCEDURE — 3078F DIAST BP <80 MM HG: CPT | Mod: CPTII,,, | Performed by: OBSTETRICS & GYNECOLOGY

## 2023-10-17 PROCEDURE — 3008F PR BODY MASS INDEX (BMI) DOCUMENTED: ICD-10-PCS | Mod: CPTII,,, | Performed by: OBSTETRICS & GYNECOLOGY

## 2023-10-17 PROCEDURE — 3078F PR MOST RECENT DIASTOLIC BLOOD PRESSURE < 80 MM HG: ICD-10-PCS | Mod: CPTII,,, | Performed by: OBSTETRICS & GYNECOLOGY

## 2023-10-17 PROCEDURE — 3074F PR MOST RECENT SYSTOLIC BLOOD PRESSURE < 130 MM HG: ICD-10-PCS | Mod: CPTII,,, | Performed by: OBSTETRICS & GYNECOLOGY

## 2023-10-17 PROCEDURE — 3074F SYST BP LT 130 MM HG: CPT | Mod: CPTII,,, | Performed by: OBSTETRICS & GYNECOLOGY

## 2023-10-17 PROCEDURE — 81025 POCT URINE PREGNANCY: ICD-10-PCS | Mod: ,,, | Performed by: OBSTETRICS & GYNECOLOGY

## 2023-10-17 PROCEDURE — 99203 OFFICE O/P NEW LOW 30 MIN: CPT | Mod: TH,,, | Performed by: OBSTETRICS & GYNECOLOGY

## 2023-10-17 PROCEDURE — 1159F PR MEDICATION LIST DOCUMENTED IN MEDICAL RECORD: ICD-10-PCS | Mod: CPTII,,, | Performed by: OBSTETRICS & GYNECOLOGY

## 2023-11-01 NOTE — PROGRESS NOTES
Chief Complaint:  Initial Prenatal Visit    History of Present Illness:  Jamia Smith is a 27 y.o. year old  presents for her new ob at 8w6d by LMP.  No c/o.  Anxiety stable.     Denies hx of genetic disorders for self or FOB    LMP: 23  Frequency: monthly  Cycle length: 7 days   Flow: heavy  Intermenstrual bleeding: No  Postcoital bleeding: No  Dysmenorrhea: Yes moderate  Sexually active: yes   Dyspareunia: No  Contraception: none   H/o STI: No   Last pap: 2/15/22 neg w/ neg gc/cz  H/o abnl pap: No   Colposcopy: no  Gardasil: 3/3  MMG: never had one  H/o abnl MMG: n/a  Colonoscopy: never had one      Review of Systems:  General/Constitutional: Chills denies. Fatigue/weakness denies. Fever denies. Night sweats denies. Hot flashes denies.   Respiratory: Cough denies. Hemoptysis denies. SOB denies. Sputum production denies. Wheezing denies.   Cardiovascular: Chest pain denies. Dizziness denies. Palpitations denies. Swelling in hands/feet denies.    Gastrointestinal: Abdominal pain denies. Blood in stool denies. Constipation denies. Diarrhea denies. Heartburn denies. Nausea denies. Vomiting denies.   Genitourinary: Incontinence denies. Blood in urine denies. Frequent urination denies. Painful urination denies.  Urinary urgency denies.  Nocturia denies.   Gynecologic: Irregular menses denies. Heavy bleeding denies. Painful menses denies. Vaginal discharge denies. Vaginal odor denies. Vaginal itching denies. Vaginal lesion denies.  Pelvic pain denies. Decreased libido denies. Vulvar lesion denies. Prolapse of genital organs denies. Painful intercourse denies. Postcoital bleeding denies.   Psychiatric: Depression denies. Anxiety denies.     OB History    Para Term  AB Living   2 1 1 0 0 1   SAB IAB Ectopic Multiple Live Births   0 0 0 0 1      # 1 - Date: 23, Sex: Male, Weight: 3.186 kg (7 lb 0.4 oz), GA: 39w1d, Delivery: , Low Transverse, Apgar1: 9, Apgar5: 9, Living: Living,  "Birth Comments: None    # 2 - Date: None, Sex: None, Weight: None, GA: None, Delivery: None, Apgar1: None, Apgar5: None, Living: None, Birth Comments: None        Past Medical History:   Diagnosis Date    Anxiety disorder, unspecified     Asthma     H. pylori infection     Vitamin D deficiency        Current Outpatient Medications:     dicyclomine (BENTYL) 20 mg tablet, Take 1 tablet (20 mg total) by mouth every 6 (six) hours as needed (Abdominal Pain). (Patient not taking: Reported on 2023), Disp: 20 tablet, Rfl: 0    fluticasone propionate (FLONASE) 50 mcg/actuation nasal spray, 1 spray (50 mcg total) by Each Nostril route 2 (two) times daily as needed for Rhinitis. (Patient not taking: Reported on 2023), Disp: 15.8 mL, Rfl: 0    hydrOXYzine pamoate (VISTARIL) 25 MG Cap, Take 1 capsule (25 mg total) by mouth every 6 (six) hours as needed (itching). (Patient not taking: Reported on 2023), Disp: 20 capsule, Rfl: 0    Review of patient's allergies indicates:   Allergen Reactions    Iodinated contrast media Shortness Of Breath    Iodine Rash and Swelling    Shrimp        Past Surgical History:   Procedure Laterality Date     SECTION N/A 2023    Procedure:  SECTION;  Surgeon: Davide Hood MD;  Location: [facility];  Service: OB/GYN;  Laterality: N/A;    CHOLECYSTECTOMY       Family History   Problem Relation Age of Onset    No Known Problems Paternal Grandfather     No Known Problems Paternal Grandmother     No Known Problems Maternal Grandmother     No Known Problems Maternal Grandfather     No Known Problems Father     No Known Problems Mother     No Known Problems Brother     No Known Problems Sister      Social History     Socioeconomic History    Marital status: Single   Tobacco Use    Smoking status: Every Day     Current packs/day: 0.50     Types: Cigarettes    Smokeless tobacco: Never    Tobacco comments:     3/21/23 per pt "I smoke  only 1-3 cigarettes a day."   Substance and " Sexual Activity    Alcohol use: Not Currently    Drug use: Not Currently     Types: Marijuana    Sexual activity: Yes     Partners: Male     Birth control/protection: None     Social Determinants of Health     Financial Resource Strain: Low Risk  (2023)    Overall Financial Resource Strain (CARDIA)     Difficulty of Paying Living Expenses: Not hard at all   Food Insecurity: No Food Insecurity (2023)    Hunger Vital Sign     Worried About Running Out of Food in the Last Year: Never true     Ran Out of Food in the Last Year: Never true   Transportation Needs: No Transportation Needs (2023)    PRAPARE - Transportation     Lack of Transportation (Medical): No     Lack of Transportation (Non-Medical): No       Physical Exam:  /66   Wt 53.7 kg (118 lb 6.4 oz)   LMP 2023   BMI 20.32 kg/m²     General Exam:    General Appearance: alert, in no acute distress, normal, well nourished.  Psych:  Orientation: time, place, person.  Mood/Affect: Normal   Abdomen:  - Soft. Non-tender. No rebound tenderness or guardingNo masses. Liver normal. Spleen normal. No hernia palpable.    TVUS:  IUP W CRL: 8w2d  IVANNA: 24 by Todays u/s  FHT: 160    Assessment/Plan:  1. Uterine scar from previous  delivery affecting pregnancy  -     Hepatitis C Antibody; Future; Expected date: 2023  -     HIV 1/2 Ag/Ab (4th Gen); Future; Expected date: 2023  -     Hepatitis B surface antigen; Future; Expected date: 2023  -     Type & Screen; Future; Expected date: 2023  -     Rubella antibody, IgG; Future; Expected date: 2023  -     Urine culture  -     CBC auto differential; Future; Expected date: 2023  -     Basic metabolic panel; Future; Expected date: 2023  -     Varicella Zoster Antibody, IgG; Future; Expected date: 2023    2. 8 weeks gestation of pregnancy  -     Hepatitis C Antibody; Future; Expected date: 2023  -     HIV 1/2 Ag/Ab (4th Gen); Future; Expected  date: 11/06/2023  -     Hepatitis B surface antigen; Future; Expected date: 11/06/2023  -     Type & Screen; Future; Expected date: 11/06/2023  -     Rubella antibody, IgG; Future; Expected date: 11/06/2023  -     Urine culture  -     CBC auto differential; Future; Expected date: 11/06/2023  -     Basic metabolic panel; Future; Expected date: 11/06/2023  -     Varicella Zoster Antibody, IgG; Future; Expected date: 11/06/2023    3. Anxiety during pregnancy in first trimester, antepartum  -     Hepatitis C Antibody; Future; Expected date: 11/06/2023  -     HIV 1/2 Ag/Ab (4th Gen); Future; Expected date: 11/06/2023  -     Hepatitis B surface antigen; Future; Expected date: 11/06/2023  -     Type & Screen; Future; Expected date: 11/06/2023  -     Rubella antibody, IgG; Future; Expected date: 11/06/2023  -     Urine culture  -     CBC auto differential; Future; Expected date: 11/06/2023  -     Basic metabolic panel; Future; Expected date: 11/06/2023  -     Varicella Zoster Antibody, IgG; Future; Expected date: 11/06/2023    4. Abnormal urine  -     Urine Culture High Risk    5. Oligomenorrhea, unspecified type  -     POCT Urinalysis, Dipstick, Automated, W/O Scope  -     US OB/GYN Procedure (Viewpoint) - Extended List; Future; Expected date: 11/06/2023  -     T.vaginalisisc, Amplified RNA  -     C.trach/N.gonor AMP RNA    6. Establish gestational age, ultrasound  -     POCT Urinalysis, Dipstick, Automated, W/O Scope  -     US OB/GYN Procedure (Viewpoint) - Extended List; Future; Expected date: 11/06/2023    7. Screen for STD (sexually transmitted disease)  -     T.vaginalisisc, Amplified RNA  -     C.trach/N.gonor AMP RNA    8. Asthma affecting pregnancy in first trimester           Prenatal counseling  Discussed appropriate weight gain for pregnancy  Tobacco avoidance/cessation  Illicit drug avoidance  PNL  PNV  GC/CZ/TV urine   Plan for repeat c/s    RTC 4 weeks.

## 2023-11-06 ENCOUNTER — INITIAL PRENATAL (OUTPATIENT)
Dept: OBSTETRICS AND GYNECOLOGY | Facility: CLINIC | Age: 28
End: 2023-11-06
Payer: MEDICAID

## 2023-11-06 VITALS
SYSTOLIC BLOOD PRESSURE: 114 MMHG | BODY MASS INDEX: 20.32 KG/M2 | DIASTOLIC BLOOD PRESSURE: 66 MMHG | WEIGHT: 118.38 LBS

## 2023-11-06 DIAGNOSIS — N91.5 OLIGOMENORRHEA, UNSPECIFIED TYPE: ICD-10-CM

## 2023-11-06 DIAGNOSIS — Z3A.08 8 WEEKS GESTATION OF PREGNANCY: ICD-10-CM

## 2023-11-06 DIAGNOSIS — Z36.89 ESTABLISH GESTATIONAL AGE, ULTRASOUND: ICD-10-CM

## 2023-11-06 DIAGNOSIS — R82.90 ABNORMAL URINE: ICD-10-CM

## 2023-11-06 DIAGNOSIS — J45.909 ASTHMA AFFECTING PREGNANCY IN FIRST TRIMESTER: ICD-10-CM

## 2023-11-06 DIAGNOSIS — O99.511 ASTHMA AFFECTING PREGNANCY IN FIRST TRIMESTER: ICD-10-CM

## 2023-11-06 DIAGNOSIS — O99.341 ANXIETY DURING PREGNANCY IN FIRST TRIMESTER, ANTEPARTUM: ICD-10-CM

## 2023-11-06 DIAGNOSIS — O34.219 UTERINE SCAR FROM PREVIOUS CESAREAN DELIVERY AFFECTING PREGNANCY: Primary | ICD-10-CM

## 2023-11-06 DIAGNOSIS — F41.9 ANXIETY DURING PREGNANCY IN FIRST TRIMESTER, ANTEPARTUM: ICD-10-CM

## 2023-11-06 DIAGNOSIS — Z11.3 SCREEN FOR STD (SEXUALLY TRANSMITTED DISEASE): ICD-10-CM

## 2023-11-06 LAB
BILIRUB UR QL STRIP: NEGATIVE
GLUCOSE UR QL STRIP: NEGATIVE
KETONES UR QL STRIP: NEGATIVE
LEUKOCYTE ESTERASE UR QL STRIP: POSITIVE
PH, POC UA: 6
POC BLOOD, URINE: NEGATIVE
POC NITRATES, URINE: NEGATIVE
PROT UR QL STRIP: NEGATIVE
SP GR UR STRIP: 1.02 (ref 1–1.03)
UROBILINOGEN UR STRIP-ACNC: 0.2 (ref 0.1–1.1)

## 2023-11-06 PROCEDURE — 87661 TRICHOMONAS VAGINALIS AMPLIF: CPT | Performed by: OBSTETRICS & GYNECOLOGY

## 2023-11-06 PROCEDURE — 99204 PR OFFICE/OUTPT VISIT, NEW, LEVL IV, 45-59 MIN: ICD-10-PCS | Mod: TH,,, | Performed by: OBSTETRICS & GYNECOLOGY

## 2023-11-06 PROCEDURE — 99204 OFFICE O/P NEW MOD 45 MIN: CPT | Mod: TH,,, | Performed by: OBSTETRICS & GYNECOLOGY

## 2023-11-06 PROCEDURE — 87491 CHLMYD TRACH DNA AMP PROBE: CPT | Performed by: OBSTETRICS & GYNECOLOGY

## 2023-11-06 PROCEDURE — 87591 N.GONORRHOEAE DNA AMP PROB: CPT | Performed by: OBSTETRICS & GYNECOLOGY

## 2023-11-06 PROCEDURE — 87186 SC STD MICRODIL/AGAR DIL: CPT | Performed by: OBSTETRICS & GYNECOLOGY

## 2023-11-08 LAB
C TRACH RRNA SPEC QL NAA+PROBE: NEGATIVE
N GONORRHOEA RRNA SPEC QL NAA+PROBE: NEGATIVE
SPECIMEN SOURCE: NORMAL
T VAGINALIS RRNA SPEC QL NAA+PROBE: NEGATIVE

## 2023-11-10 LAB — BACTERIA UR CULT: ABNORMAL

## 2023-11-13 ENCOUNTER — OFFICE VISIT (OUTPATIENT)
Dept: FAMILY MEDICINE | Facility: CLINIC | Age: 28
End: 2023-11-13
Payer: MEDICAID

## 2023-11-13 VITALS
OXYGEN SATURATION: 98 % | TEMPERATURE: 98 F | HEART RATE: 84 BPM | SYSTOLIC BLOOD PRESSURE: 110 MMHG | BODY MASS INDEX: 20.83 KG/M2 | WEIGHT: 122 LBS | HEIGHT: 64 IN | DIASTOLIC BLOOD PRESSURE: 60 MMHG

## 2023-11-13 DIAGNOSIS — Z3A.09 9 WEEKS GESTATION OF PREGNANCY: ICD-10-CM

## 2023-11-13 DIAGNOSIS — L29.9 ITCHING: ICD-10-CM

## 2023-11-13 DIAGNOSIS — B86 SCABIES: Primary | ICD-10-CM

## 2023-11-13 PROCEDURE — 3078F PR MOST RECENT DIASTOLIC BLOOD PRESSURE < 80 MM HG: ICD-10-PCS | Mod: CPTII,,, | Performed by: NURSE PRACTITIONER

## 2023-11-13 PROCEDURE — 99213 PR OFFICE/OUTPT VISIT, EST, LEVL III, 20-29 MIN: ICD-10-PCS | Mod: ,,, | Performed by: NURSE PRACTITIONER

## 2023-11-13 PROCEDURE — 3074F PR MOST RECENT SYSTOLIC BLOOD PRESSURE < 130 MM HG: ICD-10-PCS | Mod: CPTII,,, | Performed by: NURSE PRACTITIONER

## 2023-11-13 PROCEDURE — 3008F BODY MASS INDEX DOCD: CPT | Mod: CPTII,,, | Performed by: NURSE PRACTITIONER

## 2023-11-13 PROCEDURE — 99213 OFFICE O/P EST LOW 20 MIN: CPT | Mod: ,,, | Performed by: NURSE PRACTITIONER

## 2023-11-13 PROCEDURE — 3008F PR BODY MASS INDEX (BMI) DOCUMENTED: ICD-10-PCS | Mod: CPTII,,, | Performed by: NURSE PRACTITIONER

## 2023-11-13 PROCEDURE — 3078F DIAST BP <80 MM HG: CPT | Mod: CPTII,,, | Performed by: NURSE PRACTITIONER

## 2023-11-13 PROCEDURE — 3074F SYST BP LT 130 MM HG: CPT | Mod: CPTII,,, | Performed by: NURSE PRACTITIONER

## 2023-11-13 PROCEDURE — 1159F PR MEDICATION LIST DOCUMENTED IN MEDICAL RECORD: ICD-10-PCS | Mod: CPTII,,, | Performed by: NURSE PRACTITIONER

## 2023-11-13 PROCEDURE — 1159F MED LIST DOCD IN RCRD: CPT | Mod: CPTII,,, | Performed by: NURSE PRACTITIONER

## 2023-11-13 RX ORDER — PRENATAL WITH FERROUS FUM AND FOLIC ACID 3080; 920; 120; 400; 22; 1.84; 3; 20; 10; 1; 12; 200; 27; 25; 2 [IU]/1; [IU]/1; MG/1; [IU]/1; MG/1; MG/1; MG/1; MG/1; MG/1; MG/1; UG/1; MG/1; MG/1; MG/1; MG/1
1 TABLET ORAL DAILY
Qty: 30 TABLET | Refills: 1 | Status: SHIPPED | OUTPATIENT
Start: 2023-11-13 | End: 2024-11-12

## 2023-11-13 RX ORDER — CETIRIZINE HYDROCHLORIDE 10 MG/1
10 TABLET ORAL DAILY
Qty: 30 TABLET | Refills: 0 | Status: SHIPPED | OUTPATIENT
Start: 2023-11-13 | End: 2024-11-12

## 2023-11-13 RX ORDER — PERMETHRIN 50 MG/G
CREAM TOPICAL ONCE
Qty: 60 G | Refills: 0 | Status: SHIPPED | OUTPATIENT
Start: 2023-11-13 | End: 2023-11-13

## 2023-11-13 NOTE — PROGRESS NOTES
Patient ID: Jamia Smith  : 1995     Chief Complaint: Rash (Pregnancy )    Allergies: Patient is allergic to iodinated contrast media, iodine, and shrimp.     History of Present Illness:  The patient is a 27 y.o. White female who presents to clinic for evaluation and management with a chief complaint of Rash (Pregnancy )   Patient reports she and her significant other have similar rash. No one else in household has rash. Itching worse at nighttime. Interfering with sleep.    Patient reports that she is 10 weeks pregnant.  Has initial OB appointment in 2 weeks.  Patient reports that she is not taking prenatal vitamins yet      Rash  This is a new problem. The current episode started in the past 7 days. The problem has been gradually worsening since onset. The affected locations include the torso, right lower leg, right upper leg, right arm, right hand, right wrist, left arm, left hand, left wrist, left upper leg and left lower leg. The rash is characterized by dryness, redness and itchiness. It is unknown if there was an exposure to a precipitant. Pertinent negatives include no anorexia, congestion, cough, diarrhea, eye pain, facial edema, fatigue, fever, joint pain, nail changes, rhinorrhea, shortness of breath, sore throat or vomiting. Past treatments include nothing.        Past Medical History:  has a past medical history of Anxiety disorder, unspecified, Asthma, H. pylori infection, and Vitamin D deficiency.    Social History:  reports that she has been smoking cigarettes. She has been exposed to tobacco smoke. She has never used smokeless tobacco. She reports that she does not currently use alcohol. She reports that she does not currently use drugs after having used the following drugs: Marijuana.    Care Team: Patient Care Team:  Yann Norris APRN as PCP - General (Family Medicine)  Davide Wheatley as OB/GYN     Current Medications:  Current Outpatient Medications   Medication Instructions     "cetirizine (ZYRTEC) 10 mg, Oral, Daily    permethrin (ELIMITE) 5 % cream Topical (Top), Once, Apply to all areas of the body from the neck to soles of feet (30 g for average adult); leave on for 8 to 14 hours before removing by washing (shower or bath).    PNV,calcium 72/iron/folic acid (PRENATAL VITAMIN) Tab 1 tablet, Oral, Daily       Review of Systems   Constitutional:  Negative for fatigue and fever.   HENT:  Negative for nasal congestion, rhinorrhea and sore throat.    Eyes:  Negative for pain.   Respiratory:  Negative for cough and shortness of breath.    Gastrointestinal:  Negative for anorexia, diarrhea and vomiting.   Musculoskeletal:  Negative for joint pain.   Integumentary:  Positive for rash. Negative for nail changes.        Visit Vitals  /60 (BP Location: Left arm)   Pulse 84   Temp 97.9 °F (36.6 °C) (Temporal)   Ht 5' 4" (1.626 m)   Wt 55.3 kg (122 lb)   LMP 09/05/2023   SpO2 98%   BMI 20.94 kg/m²       Physical Exam  Vitals and nursing note reviewed.   Constitutional:       General: She is not in acute distress.     Appearance: Normal appearance.   HENT:      Head: Normocephalic and atraumatic.   Pulmonary:      Effort: Pulmonary effort is normal.   Skin:     General: Skin is warm and dry.      Coloration: Skin is not jaundiced.      Findings: Rash (scattered patches of  bites, few in linear pattern, to trunk, upper and lower extremities.) present.      Comments: Multiple tattoos   Neurological:      Mental Status: She is alert.      Cranial Nerves: No cranial nerve deficit.   Psychiatric:         Mood and Affect: Mood normal.          Labs Reviewed:  Chemistry:  Lab Results   Component Value Date     10/01/2023    K 3.9 10/01/2023    CHLORIDE 106 10/01/2023    BUN 13.0 10/01/2023    CREATININE 0.74 10/01/2023    EGFRNORACEVR >90 10/01/2023    GLUCOSE 98 10/01/2023    CALCIUM 8.5 10/01/2023    ALKPHOS 81 10/01/2023    LABPROT 6.4 10/01/2023    ALBUMIN 4.0 10/01/2023    AST 24 10/01/2023 "    ALT 16 10/01/2023    TSH 1.01 03/23/2022        Hematology:  Lab Results   Component Value Date    WBC 8.60 10/01/2023    RBC 4.35 10/01/2023    HGB 12.5 10/01/2023    HCT 38.1 10/01/2023    MCV 87.6 10/01/2023    MCH 28.7 10/01/2023    MCHC 32.8 10/01/2023    RDW 14.4 10/01/2023     10/01/2023    MPV 9.9 10/01/2023       Assessment & Plan:  1. Scabies  Comments:  Educated on proper treatment and application of lotion, repeat if needed in 1 week.  Advise significant other to receive treatment. Notify if no improvement  Orders:  -     permethrin (ELIMITE) 5 % cream; Apply topically once. Apply to all areas of the body from the neck to soles of feet (30 g for average adult); leave on for 8 to 14 hours before removing by washing (shower or bath). for 1 dose  Dispense: 60 g; Refill: 0    2. 9 weeks gestation of pregnancy  Comments:  Begin prenatal vitamins, keep OB appointment as scheduled  Orders:  -     PNV,calcium 72/iron/folic acid (PRENATAL VITAMIN) Tab; Take 1 tablet by mouth once daily.  Dispense: 30 tablet; Refill: 1    3. Itching  Comments:  Begin Zyrtec  Orders:  -     cetirizine (ZYRTEC) 10 MG tablet; Take 1 tablet (10 mg total) by mouth once daily.  Dispense: 30 tablet; Refill: 0         Future Appointments   Date Time Provider Department Center   12/1/2023  8:40 AM LAB, [facility] LABORATORY DRAW STATION [facility]   12/4/2023 11:10 AM Davide Hood MD [facility]   12/19/2023 10:30 AM Yann Norris APRN [facility]   12/27/2023 10:30 [facility]       Follow up if symptoms worsen or fail to improve. Call sooner if needed.    DEREJE SOARES    Lab Frequency Next Occurrence   CBC Auto Differential Once 07/26/2023   Comprehensive Metabolic Panel Once 07/26/2023   Lipid Panel Once 07/26/2023   TSH Once 07/26/2023   Hemoglobin A1C Once 07/26/2023   Vitamin D Once 07/26/2023   Fetal non-stress test- Future Once 05/10/2023   Ambulatory  referral/consult to General Surgery Once 09/18/2023   Hepatitis C Antibody Once 11/06/2023   HIV 1/2 Ag/Ab (4th Gen) Once 11/06/2023   Hepatitis B surface antigen Once 11/06/2023   Type & Screen Once 11/06/2023   Rubella antibody, IgG Once 11/06/2023   CBC auto differential Once 11/06/2023   Basic metabolic panel Once 11/06/2023   Varicella Zoster Antibody, IgG Once 11/06/2023

## 2023-11-15 ENCOUNTER — TELEPHONE (OUTPATIENT)
Dept: OBSTETRICS AND GYNECOLOGY | Facility: CLINIC | Age: 28
End: 2023-11-15
Payer: MEDICAID

## 2023-11-15 DIAGNOSIS — O23.41 URINARY TRACT INFECTION IN MOTHER DURING FIRST TRIMESTER OF PREGNANCY: Primary | ICD-10-CM

## 2023-11-15 RX ORDER — CEPHALEXIN 500 MG/1
500 CAPSULE ORAL EVERY 6 HOURS
Qty: 28 CAPSULE | Refills: 0 | Status: SHIPPED | OUTPATIENT
Start: 2023-11-15 | End: 2023-11-22

## 2023-11-15 NOTE — TELEPHONE ENCOUNTER
----- Message from Davide Hood MD sent at 11/13/2023  8:48 AM CST -----  Rx: Keflex 500 mg PO q 6 hours x 7 days

## 2023-12-01 ENCOUNTER — LAB VISIT (OUTPATIENT)
Dept: LAB | Facility: HOSPITAL | Age: 28
End: 2023-12-01
Attending: OBSTETRICS & GYNECOLOGY
Payer: MEDICAID

## 2023-12-01 DIAGNOSIS — F41.9 ANXIETY DURING PREGNANCY IN FIRST TRIMESTER, ANTEPARTUM: ICD-10-CM

## 2023-12-01 DIAGNOSIS — Z3A.08 8 WEEKS GESTATION OF PREGNANCY: ICD-10-CM

## 2023-12-01 DIAGNOSIS — O99.341 ANXIETY DURING PREGNANCY IN FIRST TRIMESTER, ANTEPARTUM: ICD-10-CM

## 2023-12-01 DIAGNOSIS — E55.9 VITAMIN D DEFICIENCY: ICD-10-CM

## 2023-12-01 DIAGNOSIS — Z00.00 ADULT GENERAL MEDICAL EXAMINATION: ICD-10-CM

## 2023-12-01 DIAGNOSIS — O34.219 UTERINE SCAR FROM PREVIOUS CESAREAN DELIVERY AFFECTING PREGNANCY: ICD-10-CM

## 2023-12-01 LAB
ABO AND RH: NORMAL
ALBUMIN SERPL-MCNC: 3.8 G/DL (ref 3.4–5)
ALBUMIN/GLOB SERPL: 1.5 RATIO
ALP SERPL-CCNC: 60 UNIT/L (ref 50–144)
ALT SERPL-CCNC: 16 UNIT/L (ref 1–45)
ANION GAP SERPL CALC-SCNC: 6 MEQ/L (ref 2–13)
ANTIBODY SCREEN: NORMAL
AST SERPL-CCNC: 24 UNIT/L (ref 14–36)
BASOPHILS # BLD AUTO: 0.07 X10(3)/MCL (ref 0.01–0.08)
BASOPHILS NFR BLD AUTO: 0.8 % (ref 0.1–1.2)
BILIRUB SERPL-MCNC: 0.5 MG/DL (ref 0–1)
BUN SERPL-MCNC: 10 MG/DL (ref 7–20)
CALCIUM SERPL-MCNC: 8.9 MG/DL (ref 8.4–10.2)
CHLORIDE SERPL-SCNC: 107 MMOL/L (ref 98–110)
CHOLEST SERPL-MCNC: 160 MG/DL (ref 0–200)
CO2 SERPL-SCNC: 23 MMOL/L (ref 21–32)
CREAT SERPL-MCNC: 0.51 MG/DL (ref 0.66–1.25)
CREAT/UREA NIT SERPL: 20 (ref 12–20)
DEPRECATED CALCIDIOL+CALCIFEROL SERPL-MC: 32.8 NG/ML (ref 30–80)
EOSINOPHIL # BLD AUTO: 0.28 X10(3)/MCL (ref 0.04–0.36)
EOSINOPHIL NFR BLD AUTO: 3.3 % (ref 0.7–7)
ERYTHROCYTE [DISTWIDTH] IN BLOOD BY AUTOMATED COUNT: 14.1 % (ref 11–14.5)
EST. AVERAGE GLUCOSE BLD GHB EST-MCNC: 102.5 MG/DL (ref 70–115)
GFR SERPLBLD CREATININE-BSD FMLA CKD-EPI: >90 MLS/MIN/1.73/M2
GLOBULIN SER-MCNC: 2.5 GM/DL (ref 2–3.9)
GLUCOSE SERPL-MCNC: 90 MG/DL (ref 70–115)
HBA1C MFR BLD: 5.2 % (ref 4–6)
HBV SURFACE AG SERPL QL IA: NEGATIVE
HBV SURFACE AG SERPL QL IA: NORMAL
HCT VFR BLD AUTO: 38.5 % (ref 36–48)
HCV AB SERPL QL IA: NONREACTIVE
HDLC SERPL-MCNC: 58 MG/DL (ref 40–60)
HGB BLD-MCNC: 13.1 G/DL (ref 11.8–16)
HIV 1+2 AB+HIV1 P24 AG SERPL QL IA: NONREACTIVE
IMM GRANULOCYTES # BLD AUTO: 0.05 X10(3)/MCL (ref 0–0.03)
IMM GRANULOCYTES NFR BLD AUTO: 0.6 % (ref 0–0.5)
LDLC SERPL DIRECT ASSAY-SCNC: 84.4 MG/DL (ref 30–100)
LYMPHOCYTES # BLD AUTO: 1.48 X10(3)/MCL (ref 1.16–3.74)
LYMPHOCYTES NFR BLD AUTO: 17.6 % (ref 20–55)
MCH RBC QN AUTO: 29.8 PG (ref 27–34)
MCHC RBC AUTO-ENTMCNC: 34 G/DL (ref 31–37)
MCV RBC AUTO: 87.5 FL (ref 79–99)
MONOCYTES # BLD AUTO: 0.53 X10(3)/MCL (ref 0.24–0.36)
MONOCYTES NFR BLD AUTO: 6.3 % (ref 4.7–12.5)
NEUTROPHILS # BLD AUTO: 6.02 X10(3)/MCL (ref 1.56–6.13)
NEUTROPHILS NFR BLD AUTO: 71.4 % (ref 37–73)
NRBC BLD AUTO-RTO: 0 %
PLATELET # BLD AUTO: 338 X10(3)/MCL (ref 140–371)
PMV BLD AUTO: 9.2 FL (ref 9.4–12.4)
POTASSIUM SERPL-SCNC: 4 MMOL/L (ref 3.5–5.1)
PROT SERPL-MCNC: 6.3 GM/DL (ref 6.3–8.2)
RBC # BLD AUTO: 4.4 X10(6)/MCL (ref 4–5.1)
RUBELLA AB, IGG (OHS): 1.99 IU/ML
SODIUM SERPL-SCNC: 136 MMOL/L (ref 135–145)
SPECIMEN OUTDATE: NORMAL
TRIGL SERPL-MCNC: 89 MG/DL (ref 30–200)
TSH SERPL-ACNC: 0.32 UIU/ML (ref 0.36–3.74)
WBC # SPEC AUTO: 8.43 X10(3)/MCL (ref 4–11.5)

## 2023-12-01 PROCEDURE — 80061 LIPID PANEL: CPT

## 2023-12-01 PROCEDURE — 80053 COMPREHEN METABOLIC PANEL: CPT

## 2023-12-01 PROCEDURE — 86803 HEPATITIS C AB TEST: CPT

## 2023-12-01 PROCEDURE — 87389 HIV-1 AG W/HIV-1&-2 AB AG IA: CPT

## 2023-12-01 PROCEDURE — 87340 HEPATITIS B SURFACE AG IA: CPT

## 2023-12-01 PROCEDURE — 83036 HEMOGLOBIN GLYCOSYLATED A1C: CPT

## 2023-12-01 PROCEDURE — 86787 VARICELLA-ZOSTER ANTIBODY: CPT

## 2023-12-01 PROCEDURE — 84443 ASSAY THYROID STIM HORMONE: CPT

## 2023-12-01 PROCEDURE — 82306 VITAMIN D 25 HYDROXY: CPT

## 2023-12-01 PROCEDURE — 86901 BLOOD TYPING SEROLOGIC RH(D): CPT | Performed by: OBSTETRICS & GYNECOLOGY

## 2023-12-01 PROCEDURE — 86762 RUBELLA ANTIBODY: CPT

## 2023-12-01 PROCEDURE — 85025 COMPLETE CBC W/AUTO DIFF WBC: CPT

## 2023-12-01 PROCEDURE — 36415 COLL VENOUS BLD VENIPUNCTURE: CPT

## 2023-12-02 LAB
VZV IGG SER IA-ACNC: 0.8
VZV IGG SER QL IA: NEGATIVE

## 2023-12-04 ENCOUNTER — ROUTINE PRENATAL (OUTPATIENT)
Dept: OBSTETRICS AND GYNECOLOGY | Facility: CLINIC | Age: 28
End: 2023-12-04
Payer: MEDICAID

## 2023-12-04 VITALS — WEIGHT: 127 LBS | BODY MASS INDEX: 21.8 KG/M2 | SYSTOLIC BLOOD PRESSURE: 118 MMHG | DIASTOLIC BLOOD PRESSURE: 72 MMHG

## 2023-12-04 DIAGNOSIS — F12.90 MARIJUANA USER: ICD-10-CM

## 2023-12-04 DIAGNOSIS — O09.899 RUBELLA NON-IMMUNE STATUS, ANTEPARTUM: ICD-10-CM

## 2023-12-04 DIAGNOSIS — J45.909 ASTHMA AFFECTING PREGNANCY IN FIRST TRIMESTER: ICD-10-CM

## 2023-12-04 DIAGNOSIS — O99.511 ASTHMA AFFECTING PREGNANCY IN FIRST TRIMESTER: ICD-10-CM

## 2023-12-04 DIAGNOSIS — Z28.39 RUBELLA NON-IMMUNE STATUS, ANTEPARTUM: ICD-10-CM

## 2023-12-04 DIAGNOSIS — O99.341 ANXIETY DURING PREGNANCY IN FIRST TRIMESTER, ANTEPARTUM: Primary | ICD-10-CM

## 2023-12-04 DIAGNOSIS — O23.41 URINARY TRACT INFECTION IN MOTHER DURING FIRST TRIMESTER OF PREGNANCY: ICD-10-CM

## 2023-12-04 DIAGNOSIS — F41.9 ANXIETY DURING PREGNANCY IN FIRST TRIMESTER, ANTEPARTUM: Primary | ICD-10-CM

## 2023-12-04 DIAGNOSIS — O34.219 UTERINE SCAR FROM PREVIOUS CESAREAN DELIVERY AFFECTING PREGNANCY: ICD-10-CM

## 2023-12-04 DIAGNOSIS — Z3A.12 12 WEEKS GESTATION OF PREGNANCY: ICD-10-CM

## 2023-12-04 PROCEDURE — 99213 PR OFFICE/OUTPT VISIT, EST, LEVL III, 20-29 MIN: ICD-10-PCS | Mod: TH,,, | Performed by: OBSTETRICS & GYNECOLOGY

## 2023-12-04 PROCEDURE — 99213 OFFICE O/P EST LOW 20 MIN: CPT | Mod: TH,,, | Performed by: OBSTETRICS & GYNECOLOGY

## 2023-12-04 PROCEDURE — 87086 URINE CULTURE/COLONY COUNT: CPT | Performed by: OBSTETRICS & GYNECOLOGY

## 2023-12-04 NOTE — PROGRESS NOTES
HPI  27 y.o.  at 12w6d here for OB check.    23: TSH 0.315    ROS  Review of Systems   Constitutional:  Negative for chills and fever.   Gastrointestinal:  Negative for abdominal pain, constipation and diarrhea.   Genitourinary:  Negative for flank pain, genital sores, pelvic pain, urgency, vaginal bleeding, vaginal discharge, vaginal pain, postcoital bleeding and vaginal odor.         OBJECTIVE  /72   Wt 57.6 kg (127 lb)   LMP 2023   BMI 21.80 kg/m²     Gen: No distress  Abdomen: Gravid, non-tender  Extremities: No edema  FHT: 150      ASSESSMENT  1. Anxiety during pregnancy in first trimester, antepartum    2. 12 weeks gestation of pregnancy  - POCT Urinalysis, Dipstick, Automated, W/O Scope  - Urine Culture High Risk    3. Urinary tract infection in mother during first trimester of pregnancy  - Urine Culture High Risk    4. Uterine scar from previous  delivery affecting pregnancy    5. Asthma affecting pregnancy in first trimester    6. Marijuana user        PLAN  Reviewed standard labor unit and kick count precautions.  Discussed pre-eclampsia precautions.  Discussed COVID-19 risks, social distancing, and isolation if respiratory symptoms develop.   Discussed rubella non immune, will need vaccine following delivery   Urine culture ADELITA collected   TSH done by PCP, mildly decreased; possibly related to early pregnancy, will repeat TSH in 1 month     RTC 4 weeks

## 2023-12-07 LAB — BACTERIA UR CULT: NORMAL

## 2023-12-19 PROCEDURE — 84443 ASSAY THYROID STIM HORMONE: CPT | Performed by: NURSE PRACTITIONER

## 2023-12-19 PROCEDURE — 84439 ASSAY OF FREE THYROXINE: CPT | Performed by: NURSE PRACTITIONER

## 2023-12-26 ENCOUNTER — PATIENT MESSAGE (OUTPATIENT)
Dept: OTHER | Facility: OTHER | Age: 28
End: 2023-12-26
Payer: MEDICAID

## 2023-12-27 NOTE — PROGRESS NOTES
HPI  28 y.o.  at 17w0d here for OB check.  Doing well.    23:  TSH: 0.281  FT4: 1.12    ROS  Review of Systems   Constitutional:  Negative for chills and fever.   Gastrointestinal:  Negative for abdominal pain, constipation and diarrhea.   Genitourinary:  Negative for flank pain, genital sores, pelvic pain, urgency, vaginal bleeding, vaginal discharge, vaginal pain, postcoital bleeding and vaginal odor.         OBJECTIVE  /66   Wt 58 kg (127 lb 12.8 oz)   LMP 2023   BMI 21.94 kg/m²     Gen: No distress  Abdomen: Gravid, non-tender  Extremities: No edema  FHT: 160s    ASSESSMENT  1. Uterine scar from previous  delivery affecting pregnancy    2. 17 weeks gestation of pregnancy  - POCT Urinalysis, Dipstick, Automated, W/O Scope  - US OB 14+ Wks, TransAbd, Single Gestation; Future    3. Anxiety during pregnancy in second trimester, antepartum    4. Rubella non-immune status, antepartum    5. Asthma affecting pregnancy in second trimester    6. Low TSH level        PLAN  Reviewed standard labor unit and kick count precautions.  Discussed pre-eclampsia precautions.  Discussed COVID-19 risks, social distancing, and isolation if respiratory symptoms develop.     Repeat TSH  T3, Free T4    Anatomy US      RTC 4 weeks

## 2024-01-02 ENCOUNTER — ROUTINE PRENATAL (OUTPATIENT)
Dept: OBSTETRICS AND GYNECOLOGY | Facility: CLINIC | Age: 29
End: 2024-01-02
Payer: MEDICAID

## 2024-01-02 ENCOUNTER — PATIENT MESSAGE (OUTPATIENT)
Dept: OTHER | Facility: OTHER | Age: 29
End: 2024-01-02
Payer: MEDICAID

## 2024-01-02 VITALS
SYSTOLIC BLOOD PRESSURE: 114 MMHG | DIASTOLIC BLOOD PRESSURE: 66 MMHG | BODY MASS INDEX: 21.94 KG/M2 | WEIGHT: 127.81 LBS

## 2024-01-02 DIAGNOSIS — O99.512 ASTHMA AFFECTING PREGNANCY IN SECOND TRIMESTER: ICD-10-CM

## 2024-01-02 DIAGNOSIS — R79.89 LOW TSH LEVEL: ICD-10-CM

## 2024-01-02 DIAGNOSIS — J45.909 ASTHMA AFFECTING PREGNANCY IN SECOND TRIMESTER: ICD-10-CM

## 2024-01-02 DIAGNOSIS — O99.342 ANXIETY DURING PREGNANCY IN SECOND TRIMESTER, ANTEPARTUM: ICD-10-CM

## 2024-01-02 DIAGNOSIS — O34.219 UTERINE SCAR FROM PREVIOUS CESAREAN DELIVERY AFFECTING PREGNANCY: Primary | ICD-10-CM

## 2024-01-02 DIAGNOSIS — Z3A.17 17 WEEKS GESTATION OF PREGNANCY: ICD-10-CM

## 2024-01-02 DIAGNOSIS — F41.9 ANXIETY DURING PREGNANCY IN SECOND TRIMESTER, ANTEPARTUM: ICD-10-CM

## 2024-01-02 DIAGNOSIS — O09.899 RUBELLA NON-IMMUNE STATUS, ANTEPARTUM: ICD-10-CM

## 2024-01-02 DIAGNOSIS — Z28.39 RUBELLA NON-IMMUNE STATUS, ANTEPARTUM: ICD-10-CM

## 2024-01-02 PROBLEM — Z98.891 STATUS POST CESAREAN SECTION: Status: RESOLVED | Noted: 2023-05-15 | Resolved: 2024-01-02

## 2024-01-02 PROCEDURE — 99213 OFFICE O/P EST LOW 20 MIN: CPT | Mod: TH,,, | Performed by: OBSTETRICS & GYNECOLOGY

## 2024-01-23 ENCOUNTER — PATIENT MESSAGE (OUTPATIENT)
Dept: OTHER | Facility: OTHER | Age: 29
End: 2024-01-23
Payer: MEDICAID

## 2024-01-25 ENCOUNTER — HOSPITAL ENCOUNTER (OUTPATIENT)
Dept: RADIOLOGY | Facility: HOSPITAL | Age: 29
Discharge: HOME | End: 2024-01-25
Attending: OBSTETRICS & GYNECOLOGY
Payer: MEDICAID

## 2024-01-25 DIAGNOSIS — Z3A.17 17 WEEKS GESTATION OF PREGNANCY: ICD-10-CM

## 2024-01-25 PROCEDURE — 76805 OB US >/= 14 WKS SNGL FETUS: CPT | Mod: TC

## 2024-01-31 NOTE — PROGRESS NOTES
HPI  28 y.o.  at 21w6d here for OB check.  Reports positive fetal movement. denies LOF, CTX.     24:  FINDINGS:  Transabdominal imaging was performed.  There is a single intrauterine pregnancy currently in a cephallic  presentation.  The cervical length was measured at 4.55 cm with the cervical os and canal both being closed.  The fetus demonstrates normal spontaneous motility and normal cardiac activity with a fetal heart rate of 144 beats per minute.  A grade 0  placenta is located anteriorly.  The amniotic fluid volume was calculated at 11.81 cm.  The fetal head, spine (where visualized), heart, gastric lumen, kidneys, three-vessel umbilical cord and cord insertion site (where visualized), urinary bladder, and extremities (where visualized) appear unremarkable.  I see no definite fetal or maternal abnormalities based on these images.  Fetal measurements:  Biparietal diameter is 4.70 cm (20 weeks 1 day, 46 percentile), head circumference is 17.77 cm (20 weeks 2 days, 39th percentile), abdominal circumference is 13.45 cm (18 weeks 6 days, 9th percentile), femur length is 3.01 cm (19 weeks 2 days, 13th percentile), and estimated fetal weight is 282 g (19 weeks 1 day, 7th percentile).  Impression:  1. THERE IS A SINGLE INTRAUTERINE PREGNANCY CURRENTLY IN A CEPHALLIC  PRESENTATION WITH A MEAN SONOGRAPHIC GESTATIONAL AGE OF 19 weeks 5 days with the IVANNA of 06/15/2024.  Relatively low percentiles for abdominal circumference and fetal weight.  NO other SIGNIFICANT FETAL OR MATERIAL ABNORMALITIES ARE APPRECIATED    ROS  Review of Systems   Constitutional:  Negative for chills and fever.   Gastrointestinal:  Negative for abdominal pain, constipation and diarrhea.   Genitourinary:  Negative for flank pain, genital sores, pelvic pain, urgency, vaginal bleeding, vaginal discharge, vaginal pain, postcoital bleeding and vaginal odor.         OBJECTIVE  /62 (BP Location: Right arm, Patient Position: Sitting, BP  Method: Medium (Manual))   Temp 97.9 °F (36.6 °C) (Temporal)   Wt 61.1 kg (134 lb 11.2 oz)   LMP 2023   BMI 23.12 kg/m²     Gen: No distress  Abdomen: Gravid, non-tender  Extremities: No edema  FHT:150  FH: 21 cm    ASSESSMENT  1. Anxiety during pregnancy in second trimester, antepartum    2. 21 weeks gestation of pregnancy    3. Uterine scar from previous  delivery affecting pregnancy    4. Rubella non-immune status, antepartum    5. Asthma affecting pregnancy in second trimester    6. Marijuana user    7. Poor fetal growth affecting management of mother in second trimester, single or unspecified fetus        PLAN  Reviewed standard labor unit and kick count precautions.  Discussed pre-eclampsia precautions.  Discussed COVID-19 risks, social distancing, and isolation if respiratory symptoms develop.     Anatomy scan reviewed   Concern for IUGR  Will refer to MFM for f/u US  Recommend cfDNA, she agrees, order provided.     Avoid illicit drugs and tobacco    RTC 4 weeks       Quality 402: Tobacco Use And Help With Quitting Among Adolescents: Patient screened for tobacco and never smoked Quality 431: Preventive Care And Screening: Unhealthy Alcohol Use - Screening: Patient screened for unhealthy alcohol use using a single question and scores less than 2 times per year Quality 130: Documentation Of Current Medications In The Medical Record: Current Medications Documented Quality 226: Preventive Care And Screening: Tobacco Use: Screening And Cessation Intervention: Patient screened for tobacco use and is an ex/non-smoker Detail Level: Detailed Quality 110: Preventive Care And Screening: Influenza Immunization: Influenza Immunization previously received during influenza season Quality 431: Preventive Care And Screening: Unhealthy Alcohol Use - Screening: Patient not identified as an unhealthy alcohol user when screened for unhealthy alcohol use using a systematic screening method

## 2024-02-05 ENCOUNTER — ROUTINE PRENATAL (OUTPATIENT)
Dept: OBSTETRICS AND GYNECOLOGY | Facility: CLINIC | Age: 29
End: 2024-02-05
Payer: MEDICAID

## 2024-02-05 VITALS
SYSTOLIC BLOOD PRESSURE: 108 MMHG | WEIGHT: 134.69 LBS | BODY MASS INDEX: 23.12 KG/M2 | DIASTOLIC BLOOD PRESSURE: 62 MMHG | TEMPERATURE: 98 F

## 2024-02-05 DIAGNOSIS — J45.909 ASTHMA AFFECTING PREGNANCY IN SECOND TRIMESTER: ICD-10-CM

## 2024-02-05 DIAGNOSIS — F12.90 MARIJUANA USER: ICD-10-CM

## 2024-02-05 DIAGNOSIS — O99.342 ANXIETY DURING PREGNANCY IN SECOND TRIMESTER, ANTEPARTUM: Primary | ICD-10-CM

## 2024-02-05 DIAGNOSIS — Z3A.21 21 WEEKS GESTATION OF PREGNANCY: ICD-10-CM

## 2024-02-05 DIAGNOSIS — O34.219 UTERINE SCAR FROM PREVIOUS CESAREAN DELIVERY AFFECTING PREGNANCY: ICD-10-CM

## 2024-02-05 DIAGNOSIS — O09.899 RUBELLA NON-IMMUNE STATUS, ANTEPARTUM: ICD-10-CM

## 2024-02-05 DIAGNOSIS — O36.5920 POOR FETAL GROWTH AFFECTING MANAGEMENT OF MOTHER IN SECOND TRIMESTER, SINGLE OR UNSPECIFIED FETUS: ICD-10-CM

## 2024-02-05 DIAGNOSIS — Z28.39 RUBELLA NON-IMMUNE STATUS, ANTEPARTUM: ICD-10-CM

## 2024-02-05 DIAGNOSIS — O99.512 ASTHMA AFFECTING PREGNANCY IN SECOND TRIMESTER: ICD-10-CM

## 2024-02-05 DIAGNOSIS — F41.9 ANXIETY DURING PREGNANCY IN SECOND TRIMESTER, ANTEPARTUM: Primary | ICD-10-CM

## 2024-02-05 PROCEDURE — 99214 OFFICE O/P EST MOD 30 MIN: CPT | Mod: TH,,, | Performed by: OBSTETRICS & GYNECOLOGY

## 2024-02-20 ENCOUNTER — PATIENT MESSAGE (OUTPATIENT)
Dept: OTHER | Facility: OTHER | Age: 29
End: 2024-02-20
Payer: MEDICAID

## 2024-02-28 DIAGNOSIS — O99.342 ANXIETY DURING PREGNANCY IN SECOND TRIMESTER, ANTEPARTUM: ICD-10-CM

## 2024-02-28 DIAGNOSIS — F12.90 MARIJUANA USER: ICD-10-CM

## 2024-02-28 DIAGNOSIS — F41.9 ANXIETY DURING PREGNANCY IN SECOND TRIMESTER, ANTEPARTUM: ICD-10-CM

## 2024-02-28 DIAGNOSIS — Z28.39 RUBELLA NON-IMMUNE STATUS, ANTEPARTUM: ICD-10-CM

## 2024-02-28 DIAGNOSIS — O99.512 ASTHMA AFFECTING PREGNANCY IN SECOND TRIMESTER: ICD-10-CM

## 2024-02-28 DIAGNOSIS — J45.909 ASTHMA AFFECTING PREGNANCY IN SECOND TRIMESTER: ICD-10-CM

## 2024-02-28 DIAGNOSIS — Z3A.25 25 WEEKS GESTATION OF PREGNANCY: ICD-10-CM

## 2024-02-28 DIAGNOSIS — O36.5920 POOR FETAL GROWTH AFFECTING MANAGEMENT OF MOTHER IN SECOND TRIMESTER, SINGLE OR UNSPECIFIED FETUS: Primary | ICD-10-CM

## 2024-02-28 DIAGNOSIS — O09.899 RUBELLA NON-IMMUNE STATUS, ANTEPARTUM: ICD-10-CM

## 2024-02-28 DIAGNOSIS — O34.219 UTERINE SCAR FROM PREVIOUS CESAREAN DELIVERY AFFECTING PREGNANCY: ICD-10-CM

## 2024-02-28 NOTE — PROGRESS NOTES
HPI  28 y.o.  at 25w6d here for OB check.  Reports positive fetal movement. denies LOF, CTX.       ROS  Review of Systems   Constitutional:  Negative for chills and fever.   Gastrointestinal:  Negative for abdominal pain, constipation and diarrhea.   Genitourinary:  Negative for flank pain, genital sores, pelvic pain, urgency, vaginal bleeding, vaginal discharge, vaginal pain, postcoital bleeding and vaginal odor.         OBJECTIVE  /62 (BP Location: Right arm, Patient Position: Sitting, BP Method: Medium (Manual))   Wt 62.1 kg (136 lb 12.8 oz)   LMP 2023   BMI 23.48 kg/m²     Gen: No distress  Abdomen: Gravid, non-tender  Extremities: No edema  FHT: 140s  FH: 23cm    ASSESSMENT  1. Anxiety during pregnancy in second trimester, antepartum  - CBC Auto Differential; Future  - GTT 1 Hour; Future  - SYPHILIS ANTIBODY (WITH REFLEX RPR); Future    2. 25 weeks gestation of pregnancy  - CBC Auto Differential; Future  - GTT 1 Hour; Future  - SYPHILIS ANTIBODY (WITH REFLEX RPR); Future  - POCT Urinalysis, Dipstick, Automated, W/O Scope    3. Uterine scar from previous  delivery affecting pregnancy  - CBC Auto Differential; Future  - GTT 1 Hour; Future  - SYPHILIS ANTIBODY (WITH REFLEX RPR); Future    4. Rubella non-immune status, antepartum  - CBC Auto Differential; Future  - GTT 1 Hour; Future  - SYPHILIS ANTIBODY (WITH REFLEX RPR); Future    5. Asthma affecting pregnancy in second trimester  - CBC Auto Differential; Future  - GTT 1 Hour; Future  - SYPHILIS ANTIBODY (WITH REFLEX RPR); Future    6. Poor fetal growth affecting management of mother in second trimester, fetus 1 of multiple gestation  - CBC Auto Differential; Future  - GTT 1 Hour; Future  - SYPHILIS ANTIBODY (WITH REFLEX RPR); Future        PLAN  Reviewed standard labor unit and kick count precautions.  Discussed pre-eclampsia precautions.  Discussed COVID-19 risks, social distancing, and isolation if respiratory symptoms develop.    Keep MFM appt 3/6/24  CBC RPR [place]   TSH, FT4, T3 not collected, discussed compliance   Genetic testing: low risk     RTC 4 weeks     This note was transcribed by Obdulia Del Angel MA. There may be transcription errors as a result, however minimal. Effort has been made to ensure accuracy of transcription, but any obvious errors or omissions should be clarified with the author of the document.

## 2024-03-04 ENCOUNTER — ROUTINE PRENATAL (OUTPATIENT)
Dept: OBSTETRICS AND GYNECOLOGY | Facility: CLINIC | Age: 29
End: 2024-03-04
Payer: MEDICAID

## 2024-03-04 VITALS
WEIGHT: 136.81 LBS | DIASTOLIC BLOOD PRESSURE: 62 MMHG | SYSTOLIC BLOOD PRESSURE: 106 MMHG | BODY MASS INDEX: 23.48 KG/M2

## 2024-03-04 DIAGNOSIS — F41.9 ANXIETY DURING PREGNANCY IN SECOND TRIMESTER, ANTEPARTUM: Primary | ICD-10-CM

## 2024-03-04 DIAGNOSIS — J45.909 ASTHMA AFFECTING PREGNANCY IN SECOND TRIMESTER: ICD-10-CM

## 2024-03-04 DIAGNOSIS — O09.899 RUBELLA NON-IMMUNE STATUS, ANTEPARTUM: ICD-10-CM

## 2024-03-04 DIAGNOSIS — Z28.39 RUBELLA NON-IMMUNE STATUS, ANTEPARTUM: ICD-10-CM

## 2024-03-04 DIAGNOSIS — O36.5921 POOR FETAL GROWTH AFFECTING MANAGEMENT OF MOTHER IN SECOND TRIMESTER, FETUS 1 OF MULTIPLE GESTATION: ICD-10-CM

## 2024-03-04 DIAGNOSIS — O34.219 UTERINE SCAR FROM PREVIOUS CESAREAN DELIVERY AFFECTING PREGNANCY: ICD-10-CM

## 2024-03-04 DIAGNOSIS — Z3A.25 25 WEEKS GESTATION OF PREGNANCY: ICD-10-CM

## 2024-03-04 DIAGNOSIS — O99.342 ANXIETY DURING PREGNANCY IN SECOND TRIMESTER, ANTEPARTUM: Primary | ICD-10-CM

## 2024-03-04 DIAGNOSIS — O99.512 ASTHMA AFFECTING PREGNANCY IN SECOND TRIMESTER: ICD-10-CM

## 2024-03-04 LAB
BILIRUB UR QL STRIP: ABNORMAL
GLUCOSE UR QL STRIP: NEGATIVE
KETONES UR QL STRIP: ABNORMAL
LEUKOCYTE ESTERASE UR QL STRIP: NEGATIVE
PH, POC UA: ABNORMAL
POC BLOOD, URINE: ABNORMAL
POC NITRATES, URINE: NEGATIVE
PROT UR QL STRIP: POSITIVE
SP GR UR STRIP: ABNORMAL (ref 1–1.03)
UROBILINOGEN UR STRIP-ACNC: ABNORMAL (ref 0.3–2.2)

## 2024-03-04 PROCEDURE — 99213 OFFICE O/P EST LOW 20 MIN: CPT | Mod: TH,,, | Performed by: OBSTETRICS & GYNECOLOGY

## 2024-03-05 ENCOUNTER — PATIENT MESSAGE (OUTPATIENT)
Dept: OTHER | Facility: OTHER | Age: 29
End: 2024-03-05
Payer: MEDICAID

## 2024-03-06 ENCOUNTER — OFFICE VISIT (OUTPATIENT)
Dept: MATERNAL FETAL MEDICINE | Facility: CLINIC | Age: 29
End: 2024-03-06
Payer: MEDICAID

## 2024-03-06 ENCOUNTER — PROCEDURE VISIT (OUTPATIENT)
Dept: MATERNAL FETAL MEDICINE | Facility: CLINIC | Age: 29
End: 2024-03-06
Payer: MEDICAID

## 2024-03-06 VITALS
WEIGHT: 137 LBS | SYSTOLIC BLOOD PRESSURE: 102 MMHG | HEART RATE: 95 BPM | BODY MASS INDEX: 23.39 KG/M2 | DIASTOLIC BLOOD PRESSURE: 64 MMHG | HEIGHT: 64 IN

## 2024-03-06 DIAGNOSIS — O99.512 ASTHMA AFFECTING PREGNANCY IN SECOND TRIMESTER: ICD-10-CM

## 2024-03-06 DIAGNOSIS — O99.332 TOBACCO SMOKING AFFECTING PREGNANCY IN SECOND TRIMESTER: ICD-10-CM

## 2024-03-06 DIAGNOSIS — O36.5920 POOR FETAL GROWTH AFFECTING MANAGEMENT OF MOTHER IN SECOND TRIMESTER, SINGLE OR UNSPECIFIED FETUS: ICD-10-CM

## 2024-03-06 DIAGNOSIS — Z3A.25 25 WEEKS GESTATION OF PREGNANCY: ICD-10-CM

## 2024-03-06 DIAGNOSIS — O09.899 RUBELLA NON-IMMUNE STATUS, ANTEPARTUM: ICD-10-CM

## 2024-03-06 DIAGNOSIS — O34.219 UTERINE SCAR FROM PREVIOUS CESAREAN DELIVERY AFFECTING PREGNANCY: ICD-10-CM

## 2024-03-06 DIAGNOSIS — J45.909 ASTHMA AFFECTING PREGNANCY IN SECOND TRIMESTER: ICD-10-CM

## 2024-03-06 DIAGNOSIS — O99.342 ANXIETY DURING PREGNANCY IN SECOND TRIMESTER, ANTEPARTUM: ICD-10-CM

## 2024-03-06 DIAGNOSIS — Z28.39 RUBELLA NON-IMMUNE STATUS, ANTEPARTUM: ICD-10-CM

## 2024-03-06 DIAGNOSIS — R79.89 LOW TSH LEVEL: Primary | ICD-10-CM

## 2024-03-06 DIAGNOSIS — F41.9 ANXIETY DURING PREGNANCY IN SECOND TRIMESTER, ANTEPARTUM: ICD-10-CM

## 2024-03-06 DIAGNOSIS — F12.90 MARIJUANA USER: ICD-10-CM

## 2024-03-06 DIAGNOSIS — O36.5921 LIGHT FOR DATES AFFECTING MANAGEMENT OF MOTHER, SECOND TRIMESTER, FETUS 1: ICD-10-CM

## 2024-03-06 DIAGNOSIS — J45.901 ASTHMA WITH ACUTE EXACERBATION, UNSPECIFIED ASTHMA SEVERITY, UNSPECIFIED WHETHER PERSISTENT: ICD-10-CM

## 2024-03-06 PROBLEM — Z86.19 HISTORY OF HELICOBACTER PYLORI INFECTION: Status: RESOLVED | Noted: 2023-01-24 | Resolved: 2024-03-06

## 2024-03-06 PROBLEM — J06.9 ACUTE UPPER RESPIRATORY INFECTION: Status: RESOLVED | Noted: 2023-09-11 | Resolved: 2024-03-06

## 2024-03-06 PROBLEM — M54.2 NECK PAIN ON LEFT SIDE: Status: RESOLVED | Noted: 2023-08-24 | Resolved: 2024-03-06

## 2024-03-06 PROBLEM — K21.9 ACID REFLUX: Status: RESOLVED | Noted: 2023-01-24 | Resolved: 2024-03-06

## 2024-03-06 PROBLEM — R10.84 GENERALIZED ABDOMINAL PAIN: Status: RESOLVED | Noted: 2023-10-01 | Resolved: 2024-03-06

## 2024-03-06 PROBLEM — B07.0 PLANTAR WART OF RIGHT FOOT: Status: RESOLVED | Noted: 2023-09-11 | Resolved: 2024-03-06

## 2024-03-06 PROCEDURE — 3074F SYST BP LT 130 MM HG: CPT | Mod: CPTII,S$GLB,, | Performed by: OBSTETRICS & GYNECOLOGY

## 2024-03-06 PROCEDURE — 1159F MED LIST DOCD IN RCRD: CPT | Mod: CPTII,S$GLB,, | Performed by: OBSTETRICS & GYNECOLOGY

## 2024-03-06 PROCEDURE — 76811 OB US DETAILED SNGL FETUS: CPT | Mod: S$GLB,,, | Performed by: OBSTETRICS & GYNECOLOGY

## 2024-03-06 PROCEDURE — 3008F BODY MASS INDEX DOCD: CPT | Mod: CPTII,S$GLB,, | Performed by: OBSTETRICS & GYNECOLOGY

## 2024-03-06 PROCEDURE — 3078F DIAST BP <80 MM HG: CPT | Mod: CPTII,S$GLB,, | Performed by: OBSTETRICS & GYNECOLOGY

## 2024-03-06 PROCEDURE — 76820 UMBILICAL ARTERY ECHO: CPT | Mod: S$GLB,,, | Performed by: OBSTETRICS & GYNECOLOGY

## 2024-03-06 PROCEDURE — 99203 OFFICE O/P NEW LOW 30 MIN: CPT | Mod: 25,TH,S$GLB, | Performed by: OBSTETRICS & GYNECOLOGY

## 2024-03-06 PROCEDURE — 1160F RVW MEDS BY RX/DR IN RCRD: CPT | Mod: CPTII,S$GLB,, | Performed by: OBSTETRICS & GYNECOLOGY

## 2024-03-06 NOTE — PROGRESS NOTES
Maternal Fetal Medicine Consult    Subjective     Patient ID: [id number]    Chief Complaint: MFM CONSULT W/US (FGR, anxiety during pregnancy, etc)      HPI: 28 y.o.  female  at 26w1d gestation with Estimated Date of Delivery: 24 by LMP, consistent with early US. She is sent for MFM consultation for fetal growth restriction.    She had concern for fetal growth restriction on outside ultrasound.  She had negative cell free DNA.  She has history of anxiety and is not currently on any medication and feeling well.  She has history of asthma, not currently on any medication and has not had any exacerbations recently.  On 2023, she had low TSH level at 0.281.  She had a normal free T4 at 1.12.  She denies any known history of hypothyroidism.  Her prenatal labs showed that she was rubella nonimmune.  She used to use marijuana but currently reports has not used it since she became pregnant.  She does smokes cigarettes, about 1/2 pack per day.  Patient was accompanied by her Partner Malik Snow.        She denies any personal or family history of aneuploidy or anomalies. She denies any exposure to high fevers, viral rashes, illicit drugs or alcohol in this pregnancy.  She denies any leaking fluid, vaginal bleeding, contractions, decreased fetal movement. Denies headaches, visual disturbances, or epigastric pain.       Pregnancy complications include:  Patient Active Problem List   Diagnosis    Pregnancy    Anxiety    Asthma    Mild depression    Vitamin D deficiency    Asthma affecting pregnancy in second trimester    Anxiety during pregnancy in second trimester, antepartum    Uterine scar from previous  delivery affecting pregnancy    Rubella non-immune status, antepartum    Low TSH level    Tobacco smoking affecting pregnancy in second trimester    Light for dates affecting management of mother, second trimester, fetus 1        Past Medical History:   Diagnosis Date    Anxiety disorder,  "unspecified     not currently    Asthma 1997    [place]    H. pylori infection     Marijuana user 2023    Vitamin D deficiency        Past Surgical History:   Procedure Laterality Date     SECTION N/A 2023    Procedure:  SECTION;  Surgeon: Davide Hood MD;  Location: [facility];  Service: OB/GYN;  Laterality: N/A;    CHOLECYSTECTOMY         Family History   Problem Relation Age of Onset    No Known Problems Paternal Grandfather     No Known Problems Paternal Grandmother     No Known Problems Maternal Grandmother     No Known Problems Maternal Grandfather     No Known Problems Father     No Known Problems Mother     No Known Problems Brother     No Known Problems Sister        Social History     Socioeconomic History    Marital status: Single   Tobacco Use    Smoking status: Every Day     Current packs/day: 0.50     Average packs/day: 0.5 packs/day for 8.2 years (4.1 ttl pk-yrs)     Types: Cigarettes     Start date:      Passive exposure: Current    Smokeless tobacco: Never    Tobacco comments:     3/21/23 per pt "I smoke  only 1-3 cigarettes a day."   Substance and Sexual Activity    Alcohol use: Not Currently    Drug use: Not Currently     Types: Marijuana    Sexual activity: Yes     Partners: Male     Birth control/protection: None     Social Determinants of Health     Financial Resource Strain: Low Risk  (2023)    Overall Financial Resource Strain (CARDIA)     Difficulty of Paying Living Expenses: Not hard at all   Food Insecurity: No Food Insecurity (2023)    Hunger Vital Sign     Worried About Running Out of Food in the Last Year: Never true     Ran Out of Food in the Last Year: Never true   Transportation Needs: No Transportation Needs (2023)    PRAPARE - Transportation     Lack of Transportation (Medical): No     Lack of Transportation (Non-Medical): No       Current Outpatient Medications   Medication Sig Dispense Refill    PNV,calcium 72/iron/folic acid " "(PRENATAL VITAMIN) Tab Take 1 tablet by mouth once daily. 30 tablet 1    cetirizine (ZYRTEC) 10 MG tablet Take 1 tablet (10 mg total) by mouth once daily. (Patient not taking: Reported on 3/4/2024) 30 tablet 0     No current facility-administered medications for this visit.       Review of patient's allergies indicates:   Allergen Reactions    Iodinated contrast media Shortness Of Breath    Iodine Rash and Swelling    Shrimp        Medications:  Current Outpatient Medications   Medication Instructions    cetirizine (ZYRTEC) 10 mg, Oral, Daily    PNV,calcium 72/iron/folic acid (PRENATAL VITAMIN) Tab 1 tablet, Oral, Daily       Review of Systems   12 point review of systems conducted, negative except as stated in the history of present illness. See HPI for details.      Objective     Visit Vitals  /64 (BP Location: Left arm, Patient Position: Sitting, BP Method: Large (Automatic))   Pulse 95   Ht 5' 4" (1.626 m)   Wt 62.1 kg (137 lb)   LMP 09/05/2023   BMI 23.52 kg/m²        Physical Exam  Vitals and nursing note reviewed.   Constitutional:       General: She is not in acute distress.     Appearance: Normal appearance.   HENT:      Head: Normocephalic and atraumatic.      Nose: Nose normal. No congestion.      Mouth/Throat:      Pharynx: Oropharynx is clear.   Eyes:      General: No scleral icterus.     Conjunctiva/sclera: Conjunctivae normal.   Cardiovascular:      Rate and Rhythm: Normal rate and regular rhythm.   Pulmonary:      Effort: No respiratory distress.      Breath sounds: Normal breath sounds. No wheezing.   Abdominal:      General: Abdomen is flat.      Palpations: Abdomen is soft.      Tenderness: There is no abdominal tenderness. There is no right CVA tenderness, left CVA tenderness or guarding.      Comments: No CVA tenderness gravid uterus.    Musculoskeletal:         General: Normal range of motion.      Cervical back: Neck supple.      Right lower leg: No edema.      Left lower leg: No edema. "   Skin:     General: Skin is warm.      Findings: No bruising or rash.   Neurological:      General: No focal deficit present.      Mental Status: She is oriented to person, place, and time.      Deep Tendon Reflexes: Reflexes normal.      Comments: Normal reflexes   Psychiatric:         Mood and Affect: Mood normal.         Behavior: Behavior normal.         Thought Content: Thought content normal.         Judgment: Judgment normal.         ASSESSMENT/PLAN:     28 y.o.  female with IUP at 26w1d    Concern for fetal restriction, currently with low normal fetal growth  There is low normal fetal growth with an EFW of 751 g at the 22% and the AC at the 9% (AC lagging about 9 days behind) on 3/6/2024.  AFV is normal.   Umbilical artery Doppler is normal today.    Because of range of error of ultrasound at this gestational age and possibility of more significant fetal growth restriction, umbilical artery Doppler was done which was normal.  Causes of fetal growth restriction were discussed briefly.  Discussed options for prenatal genetic testing.  Patient already had negative cell free DNA.  Discussed risks and benefits of amniocentesis.  Amniocentesis was offered and patient declined.  Reviewed need for  evaluation.    With low-normal fetal growth, we will plan to recheck fetal growth in 3 weeks.  Fetal kick count instructions were given.      Anxiety on no medication  Discussed risks with depression and antidepressant use in pregnancy. I have shared with her the five-fold increased risk of recurrence of depression with discontinuing the medication in pregnancy. It was weighed against the risk of medication use including potential birth defects of some anti depression medications, as well as the likelihood of SSRI medications causing pulmonary hypertension when used after 20 weeks as well as  morbidity when used close to delivery (in 30% of patients). Although earlier limited data suggested  association of paroxetine (Paxil) with right ventricular outflow tract obstruction and sertraline (Zoloft) with ventricular septal heart defects, a recent (2014) large population based study showed no substantial increase in the risk of cardiac malformations attributable to antidepressant use in 1st trimester. The absolute risk of other reported risks in some studies is very small: omphalocele of 1 in 5,000 births, craniosynostosis 1 in 1,800 births, and anencephaly of 1 in 1,000 births.    The potential risks of SSRI use in pregnancy must be considered in the context of the risk of relapse of depression if treatment is discontinued. In a study of pregnant women taking antidepressants before conception, a 68% relapse of depression was documented in those who discontinued medications during pregnancy compared with 25% relapse in those who continued antidepressant medications. Factors associated with relapse during pregnancy include a history of more than 5 years of depressive illness and of more than four episodes of relapse.    With no symptoms at this time, it was agreed to stay off the medication at this time with patient to report symptoms of depression recurrence. If she experiences any SI/HI tendencies, she is to report it immediately to provider/ER for prompt intervention. She was advised to continue follow up care with her Mental Health provider.       Marijuana use in pregnancy  Commended her on her abstinence.  Marijuana use in pregnancy is associated with long term neurobehavioral adverse outcomes, as well as fetal growth restriction, stillbirth, spontaneous  birth, and  intensive care unit admission. .  I stressed the importance of complete abstinence from drug use in pregnancy.       Smoking in pregnancy  I discussed with her adverse  outcomes associated with smoking in pregnancy. The increased  risks including first trimester miscarriage, fetal growth restriction,  placenta previa,  delivery (5-8%),  premature rupture of membranes, low birth weight (13-19%), and placental abruption, all of which increased  morbidity and mortality.   There is also increase risk of SIDS (two fold higher risk than non smoker) in the infancy period, asthma, colic, obesity and cognitive problems with baby if smoking is continued. She was urged to quit smoking.        Asthma, on no medication  I shared with her that in about 1/3 of patients with asthma, severity will stay the same, and in 1/3, it will get better, and 1/3 it may get worse. The ultimate goal of asthma therapy in pregnancy is maintaining adequate oxygenation of the fetus by preventing hypoxic episodes in the mother. She could take albuterol on a prn basis.  She was advised to keep a logbook of attacks and the need for inhaler treatments.  If she needs to use albuterol > three times a week, she will let me know as additional treatments like inhaled steroids will need to give her at that time. If beta-mimetic inhaler is needed more than three times per week, then she may benefit from additional treatment.    Uncontrolled asthma may be associated with several pregnancy complications, including  birth, preeclampsia, FGR, and maternal/ morbidity. Maternal hypoxia from severe asthma.  If frequent attacks are present and multiple medication use is needed in pregnancy, then serial ultrasounds in pregnancy may be beneficial. She will need to do fetal kick counts after 24 weeks till delivery. If evidence of FGR or other complications, then more formal fetal surveillance will be needed.       Low TSH level earlier in pregnancy  Patient had a low TSH earlier in the pregnancy.  She has no symptoms of hyperthyroidism with no palpitations, GI changes, tremors. TSH is frequently low in early pregnancy. Patient without symptoms, and with normal Free T4, this is normal variant for pregnancy and no treatment or  further evaluation is needed.  Expectant management.       Rubella nonimmune status  Recommend patient receive MMR vaccine postpartum as it is contraindicated in pregnancy.  Expectant management.    Fetal growth appears to be in a low-normal range at this time.  Abdominal circumference lagging a little bit behind but no evidence of fetal growth restriction with normal umbilical artery Doppler.  We will plan to do fetal kick counts, resting in lateral recumbent position, preeclampsia warnings, plan to rechecked in 3 weeks to assess interval growth and decide if formal surveillance is going to be needed or not.  The importance of quitting smoking completely was emphasized with the patient. Patient's questions were answered.  She is in agreement with plan of care.      Follow up in about 3 weeks (around 3/27/2024) for MFM follow-up, Repeat ultrasound, Room 1 or 2.     Future Appointments   Date Time Provider Department Center   3/27/2024  1:30 PM Vishnu Urbina MD [facility]   3/27/2024  1:30 PM ROOM 3, [facility]   4/2/2024  1:20 PM Tawnya Kang WHNP [facility]        Patient was evaluated by FADI Tan and Dr. Urbina.  Final assessment and recommendations as stated above were made by Dr. Urbina.    This note was created with the assistance of [employer] voice recognition software. There may be transcription errors as a result of using this technology, however minimal. Effort has been made to ensure accuracy of transcription, but any obvious errors or omissions should be clarified with the author of the document.

## 2024-03-19 ENCOUNTER — PATIENT MESSAGE (OUTPATIENT)
Dept: OTHER | Facility: OTHER | Age: 29
End: 2024-03-19
Payer: MEDICAID

## 2024-03-20 ENCOUNTER — LAB VISIT (OUTPATIENT)
Dept: LAB | Facility: HOSPITAL | Age: 29
End: 2024-03-20
Attending: OBSTETRICS & GYNECOLOGY
Payer: MEDICAID

## 2024-03-20 DIAGNOSIS — J45.909 ASTHMA AFFECTING PREGNANCY IN SECOND TRIMESTER: ICD-10-CM

## 2024-03-20 DIAGNOSIS — O99.512 ASTHMA AFFECTING PREGNANCY IN SECOND TRIMESTER: ICD-10-CM

## 2024-03-20 DIAGNOSIS — F41.9 ANXIETY DURING PREGNANCY IN SECOND TRIMESTER, ANTEPARTUM: ICD-10-CM

## 2024-03-20 DIAGNOSIS — O09.899 RUBELLA NON-IMMUNE STATUS, ANTEPARTUM: ICD-10-CM

## 2024-03-20 DIAGNOSIS — O99.342 ANXIETY DURING PREGNANCY IN SECOND TRIMESTER, ANTEPARTUM: ICD-10-CM

## 2024-03-20 DIAGNOSIS — Z28.39 RUBELLA NON-IMMUNE STATUS, ANTEPARTUM: ICD-10-CM

## 2024-03-20 DIAGNOSIS — O36.5921 POOR FETAL GROWTH AFFECTING MANAGEMENT OF MOTHER IN SECOND TRIMESTER, FETUS 1 OF MULTIPLE GESTATION: ICD-10-CM

## 2024-03-20 DIAGNOSIS — Z3A.25 25 WEEKS GESTATION OF PREGNANCY: ICD-10-CM

## 2024-03-20 DIAGNOSIS — O34.219 UTERINE SCAR FROM PREVIOUS CESAREAN DELIVERY AFFECTING PREGNANCY: ICD-10-CM

## 2024-03-20 LAB
BASOPHILS # BLD AUTO: 0.05 X10(3)/MCL (ref 0.01–0.08)
BASOPHILS NFR BLD AUTO: 0.5 % (ref 0.1–1.2)
EOSINOPHIL # BLD AUTO: 0.24 X10(3)/MCL (ref 0.04–0.36)
EOSINOPHIL NFR BLD AUTO: 2.3 % (ref 0.7–7)
ERYTHROCYTE [DISTWIDTH] IN BLOOD BY AUTOMATED COUNT: 14.2 % (ref 11–14.5)
HCT VFR BLD AUTO: 32.8 % (ref 36–48)
HGB BLD-MCNC: 10.9 G/DL (ref 11.8–16)
IMM GRANULOCYTES # BLD AUTO: 0.13 X10(3)/MCL (ref 0–0.03)
IMM GRANULOCYTES NFR BLD AUTO: 1.2 % (ref 0–0.5)
LYMPHOCYTES # BLD AUTO: 1.31 X10(3)/MCL (ref 1.16–3.74)
LYMPHOCYTES NFR BLD AUTO: 12.4 % (ref 20–55)
MCH RBC QN AUTO: 29.9 PG (ref 27–34)
MCHC RBC AUTO-ENTMCNC: 33.2 G/DL (ref 31–37)
MCV RBC AUTO: 89.9 FL (ref 79–99)
MONOCYTES # BLD AUTO: 0.64 X10(3)/MCL (ref 0.24–0.36)
MONOCYTES NFR BLD AUTO: 6.1 % (ref 4.7–12.5)
NEUTROPHILS # BLD AUTO: 8.16 X10(3)/MCL (ref 1.56–6.13)
NEUTROPHILS NFR BLD AUTO: 77.5 % (ref 37–73)
NRBC BLD AUTO-RTO: 0 %
PLATELET # BLD AUTO: 255 X10(3)/MCL (ref 140–371)
PMV BLD AUTO: 10.5 FL (ref 9.4–12.4)
RBC # BLD AUTO: 3.65 X10(6)/MCL (ref 4–5.1)
T PALLIDUM AB SER QL: NONREACTIVE
WBC # SPEC AUTO: 10.53 X10(3)/MCL (ref 4–11.5)

## 2024-03-20 PROCEDURE — 86780 TREPONEMA PALLIDUM: CPT

## 2024-03-20 PROCEDURE — 85025 COMPLETE CBC W/AUTO DIFF WBC: CPT

## 2024-03-20 PROCEDURE — 36415 COLL VENOUS BLD VENIPUNCTURE: CPT

## 2024-03-22 DIAGNOSIS — O36.5920 POOR FETAL GROWTH AFFECTING MANAGEMENT OF MOTHER IN SECOND TRIMESTER, SINGLE OR UNSPECIFIED FETUS: Primary | ICD-10-CM

## 2024-03-27 PROBLEM — O99.343 ANXIETY DURING PREGNANCY IN THIRD TRIMESTER, ANTEPARTUM: Status: ACTIVE | Noted: 2023-11-06

## 2024-03-27 PROBLEM — E55.9 VITAMIN D DEFICIENCY: Status: RESOLVED | Noted: 2023-01-24 | Resolved: 2024-03-27

## 2024-03-27 PROBLEM — O36.5930 LIGHT FOR DATES AFFECTING MANAGEMENT OF MOTHER, THIRD TRIMESTER, NOT APPLICABLE OR UNSPECIFIED FETUS: Status: ACTIVE | Noted: 2024-03-06

## 2024-03-27 PROBLEM — O99.333 TOBACCO SMOKING AFFECTING PREGNANCY IN THIRD TRIMESTER: Status: ACTIVE | Noted: 2024-03-06

## 2024-03-27 PROBLEM — F41.9 ANXIETY: Status: RESOLVED | Noted: 2023-01-24 | Resolved: 2024-03-27

## 2024-03-27 PROBLEM — O99.513 ASTHMA AFFECTING PREGNANCY IN THIRD TRIMESTER: Status: ACTIVE | Noted: 2023-11-06

## 2024-04-02 ENCOUNTER — ROUTINE PRENATAL (OUTPATIENT)
Dept: OBSTETRICS AND GYNECOLOGY | Facility: CLINIC | Age: 29
End: 2024-04-02
Payer: MEDICAID

## 2024-04-02 VITALS — DIASTOLIC BLOOD PRESSURE: 68 MMHG | WEIGHT: 138 LBS | BODY MASS INDEX: 23.69 KG/M2 | SYSTOLIC BLOOD PRESSURE: 110 MMHG

## 2024-04-02 DIAGNOSIS — Z28.39 RUBELLA NON-IMMUNE STATUS, ANTEPARTUM: ICD-10-CM

## 2024-04-02 DIAGNOSIS — O34.219 UTERINE SCAR FROM PREVIOUS CESAREAN DELIVERY AFFECTING PREGNANCY: ICD-10-CM

## 2024-04-02 DIAGNOSIS — O09.899 RUBELLA NON-IMMUNE STATUS, ANTEPARTUM: ICD-10-CM

## 2024-04-02 DIAGNOSIS — O36.5920 POOR FETAL GROWTH AFFECTING MANAGEMENT OF MOTHER IN SECOND TRIMESTER, SINGLE OR UNSPECIFIED FETUS: Primary | ICD-10-CM

## 2024-04-02 DIAGNOSIS — R79.89 LOW TSH LEVEL: ICD-10-CM

## 2024-04-02 DIAGNOSIS — O99.512 ASTHMA AFFECTING PREGNANCY IN SECOND TRIMESTER: ICD-10-CM

## 2024-04-02 DIAGNOSIS — F41.9 ANXIETY DURING PREGNANCY IN SECOND TRIMESTER, ANTEPARTUM: ICD-10-CM

## 2024-04-02 DIAGNOSIS — Z3A.30 30 WEEKS GESTATION OF PREGNANCY: ICD-10-CM

## 2024-04-02 DIAGNOSIS — J45.909 ASTHMA AFFECTING PREGNANCY IN SECOND TRIMESTER: ICD-10-CM

## 2024-04-02 DIAGNOSIS — F12.90 MARIJUANA USER: ICD-10-CM

## 2024-04-02 DIAGNOSIS — O99.342 ANXIETY DURING PREGNANCY IN SECOND TRIMESTER, ANTEPARTUM: ICD-10-CM

## 2024-04-02 PROCEDURE — 99213 OFFICE O/P EST LOW 20 MIN: CPT | Mod: TH,,,

## 2024-04-02 NOTE — PROGRESS NOTES
Chief Complaint:  Routine Prenatal Visit ([facility] appt tomorrow )    History of Present Illness:  Jamia Smith is a 28 y.o. year old  at 30w0d presents for her ob exam. She is doing well. +fetal movement. Denies vaginal bleeding, vaginal discharge, LOF or contractions. Negative preeclampsia ROS. [facility] appt tomorrow for repeat growth scan.     24  H&H: 10.9&32.8  RPR: negative  Irving: non performed  TSH, FT4, T3 not performed    POC per Dr. Urbina note (24)  Fetal growth appears to be in a low-normal range at this time.  Abdominal circumference lagging a little bit behind but no evidence of fetal growth restriction with normal umbilical artery Doppler.  We will plan to do fetal kick counts, resting in lateral recumbent position, preeclampsia warnings, plan to rechecked in 3 weeks to assess interval growth and decide if formal surveillance is going to be needed or not.  The importance of quitting smoking completely was emphasized with the patient. Patient's questions were answered.  She is in agreement with plan of care.       Review of Systems:  General/Constitutional: Fever denies. Headache denies.    Skin: Rash denies.   Respiratory: Cough denies. SOB denies. Wheezing denies .   Cardiovascular: Chest pain denies. Dizziness denies. Palpitations denies. Swelling in hands/feet denies.    Gastrointestinal: Abdominal pain denies. Constipation denies. Diarrhea denies. Heartburn denies. Nausea denies. Vomiting denies.    Genitourinary: Painful urination denies.   Gynecologic: Vaginal bleeding denies. Vaginal discharge denies. Leaking amniotic fluid denies. Contractions denies.    Psychiatric: Depressed mood denies. Suicidal thoughts denies.       Current Outpatient Medications:     PNV,calcium 72/iron/folic acid (PRENATAL VITAMIN) Tab, Take 1 tablet by mouth once daily., Disp: 30 tablet, Rfl: 1    cetirizine (ZYRTEC) 10 MG tablet, Take 1 tablet (10 mg total) by mouth once daily. (Patient not taking:  Reported on 2024), Disp: 30 tablet, Rfl: 0      Physical Exam:  /68   Wt 62.6 kg (138 lb)   LMP 2023   BMI 23.69 kg/m²     Constitutional: General appearance: healthy, well-nourished and well-developed   Psychiatric:  Orientation to time, place and person. Normal mood and affect and active, alert   Abdomen: Auscultation/Inspection/Palpation: Gravid. No tenderness or masses. Soft, nondistended   Extremities: no CCE     FH: 29  FHT: 140      Assessment/Plan  1. Poor fetal growth affecting management of mother in second trimester, single or unspecified fetus    2. 30 weeks gestation of pregnancy  -     POCT Urinalysis, Dipstick, Automated, W/O Scope    3. Anxiety during pregnancy in second trimester, antepartum    4. Rubella non-immune status, antepartum    5. Asthma affecting pregnancy in second trimester    6. Marijuana user    7. Low TSH level    8. Uterine scar from previous  delivery affecting pregnancy         PLAN  Reviewed standard labor unit and kick count precautions.  Discussed pre-eclampsia precautions.  Discussed COVID-19 risks, social distancing, and isolation if respiratory symptoms develop.   Keep MFM appt 24  Needs abimbola and thyroid studies to be drawn.     RTC 2 weeks with Dr. Hood for a routine tummy check

## 2024-04-04 NOTE — PROGRESS NOTES
HPI  28 y.o.  at 32w0d here for OB check.  Doing well. Rescheduled appt with MFM to .  Reports positive fetal movement. denies LOF, CTX.      ROS  Review of Systems   Constitutional:  Negative for chills and fever.   Gastrointestinal:  Negative for abdominal pain, constipation and diarrhea.   Genitourinary:  Negative for flank pain, genital sores, pelvic pain, urgency, vaginal bleeding, vaginal discharge, vaginal pain, postcoital bleeding and vaginal odor.         OBJECTIVE  BP (!) 108/58   Wt 63.5 kg (140 lb)   LMP 2023   BMI 24.03 kg/m²     Gen: No distress  Abdomen: Gravid, non-tender  Extremities: No edema    FHT: 140s    ASSESSMENT  1. Anemia during pregnancy in third trimester    2. 32 weeks gestation of pregnancy  - POCT Urinalysis, Dipstick, Automated, W/O Scope    3. Anxiety during pregnancy in third trimester, antepartum    4. Low TSH level    5. Uterine scar from previous  delivery affecting pregnancy    6. Rubella non-immune status, antepartum    7. Asthma affecting pregnancy in third trimester    8. Tobacco smoking affecting pregnancy in third trimester    9. Light for dates affecting management of mother, third trimester, not applicable or unspecified fetus        PLAN  Reviewed standard labor unit and kick count precautions.  Discussed pre-eclampsia precautions.  Discussed COVID-19 risks, social distancing, and isolation if respiratory symptoms develop.     [place], Thyroid studies to be collected. Discussed compliance.   Keep MFM appt next week     RTC 2 weeks     This note was transcribed by Obdulia Del Angel. There may be transcription errors as a result, however minimal. Effort has been made to ensure accuracy of transcription, but any obvious errors or omissions should be clarified with the author of the document.

## 2024-04-10 PROBLEM — O99.013 ANEMIA DURING PREGNANCY IN THIRD TRIMESTER: Status: ACTIVE | Noted: 2023-05-15

## 2024-04-10 PROBLEM — Z34.90 PREGNANCY: Status: RESOLVED | Noted: 2022-08-06 | Resolved: 2024-04-10

## 2024-04-16 ENCOUNTER — PATIENT MESSAGE (OUTPATIENT)
Dept: OTHER | Facility: OTHER | Age: 29
End: 2024-04-16
Payer: MEDICAID

## 2024-04-16 ENCOUNTER — ROUTINE PRENATAL (OUTPATIENT)
Dept: OBSTETRICS AND GYNECOLOGY | Facility: CLINIC | Age: 29
End: 2024-04-16
Payer: MEDICAID

## 2024-04-16 VITALS — DIASTOLIC BLOOD PRESSURE: 58 MMHG | BODY MASS INDEX: 24.03 KG/M2 | SYSTOLIC BLOOD PRESSURE: 108 MMHG | WEIGHT: 140 LBS

## 2024-04-16 DIAGNOSIS — J45.909 ASTHMA AFFECTING PREGNANCY IN THIRD TRIMESTER: ICD-10-CM

## 2024-04-16 DIAGNOSIS — F41.9 ANXIETY DURING PREGNANCY IN THIRD TRIMESTER, ANTEPARTUM: ICD-10-CM

## 2024-04-16 DIAGNOSIS — O36.5930 LIGHT FOR DATES AFFECTING MANAGEMENT OF MOTHER, THIRD TRIMESTER, NOT APPLICABLE OR UNSPECIFIED FETUS: ICD-10-CM

## 2024-04-16 DIAGNOSIS — R79.89 LOW TSH LEVEL: ICD-10-CM

## 2024-04-16 DIAGNOSIS — O99.513 ASTHMA AFFECTING PREGNANCY IN THIRD TRIMESTER: ICD-10-CM

## 2024-04-16 DIAGNOSIS — Z28.39 RUBELLA NON-IMMUNE STATUS, ANTEPARTUM: ICD-10-CM

## 2024-04-16 DIAGNOSIS — O34.219 UTERINE SCAR FROM PREVIOUS CESAREAN DELIVERY AFFECTING PREGNANCY: ICD-10-CM

## 2024-04-16 DIAGNOSIS — O99.333 TOBACCO SMOKING AFFECTING PREGNANCY IN THIRD TRIMESTER: ICD-10-CM

## 2024-04-16 DIAGNOSIS — O99.343 ANXIETY DURING PREGNANCY IN THIRD TRIMESTER, ANTEPARTUM: ICD-10-CM

## 2024-04-16 DIAGNOSIS — Z3A.32 32 WEEKS GESTATION OF PREGNANCY: ICD-10-CM

## 2024-04-16 DIAGNOSIS — O99.013 ANEMIA DURING PREGNANCY IN THIRD TRIMESTER: Primary | ICD-10-CM

## 2024-04-16 DIAGNOSIS — O09.899 RUBELLA NON-IMMUNE STATUS, ANTEPARTUM: ICD-10-CM

## 2024-04-16 PROCEDURE — 99214 OFFICE O/P EST MOD 30 MIN: CPT | Mod: TH,,, | Performed by: OBSTETRICS & GYNECOLOGY

## 2024-04-24 NOTE — PROGRESS NOTES
HPI  28 y.o.  at 33w6d here for OB check.  Rescheduled MFM appt again due to transportation issues.   Reports positive fetal movement. denies LOF, CTX.       ROS  Review of Systems   Constitutional:  Negative for chills and fever.   Gastrointestinal:  Negative for abdominal pain, constipation and diarrhea.   Genitourinary:  Negative for flank pain, genital sores, pelvic pain, urgency, vaginal bleeding, vaginal discharge, vaginal pain, postcoital bleeding and vaginal odor.         OBJECTIVE  /66   Wt 63.6 kg (140 lb 3.2 oz)   LMP 2023   BMI 24.07 kg/m²     Gen: No distress  Abdomen: Gravid, non-tender  Extremities: No edema  FHT: 135  FH: 30 cm    ASSESSMENT  1. Uterine scar from previous  delivery affecting pregnancy  - GTT 1 Hour; Future    2. 33 weeks gestation of pregnancy  - POCT Urinalysis, Dipstick, Automated, W/O Scope  - GTT 1 Hour; Future    3. Anxiety during pregnancy in third trimester, antepartum  - GTT 1 Hour; Future    4. Anemia during pregnancy in third trimester  - GTT 1 Hour; Future    5. Rubella non-immune status, antepartum  - GTT 1 Hour; Future    6. Asthma affecting pregnancy in third trimester  - GTT 1 Hour; Future    7. Tobacco smoking affecting pregnancy in third trimester  - GTT 1 Hour; Future    8. Light for dates affecting management of mother, third trimester, not applicable or unspecified fetus  - GTT 1 Hour; Future        PLAN  Reviewed standard labor unit and kick count precautions.  Discussed pre-eclampsia precautions.  Discussed COVID-19 risks, social distancing, and isolation if respiratory symptoms develop.     Thyroid studies, [place] not collected   Keep MFM appt 24    Discussed option for medicaid transportation.  They will need 3 days advance notice.     Cont Fe    RTC 2 weeks ob check     This note was transcribed by Obdulia Del Angel. There may be transcription errors as a result, however minimal. Effort has been made to ensure accuracy of  transcription, but any obvious errors or omissions should be clarified with the author of the document.

## 2024-04-29 ENCOUNTER — LAB VISIT (OUTPATIENT)
Dept: LAB | Facility: HOSPITAL | Age: 29
End: 2024-04-29
Attending: OBSTETRICS & GYNECOLOGY
Payer: MEDICAID

## 2024-04-29 ENCOUNTER — TELEPHONE (OUTPATIENT)
Dept: OBSTETRICS AND GYNECOLOGY | Facility: CLINIC | Age: 29
End: 2024-04-29

## 2024-04-29 ENCOUNTER — ROUTINE PRENATAL (OUTPATIENT)
Dept: OBSTETRICS AND GYNECOLOGY | Facility: CLINIC | Age: 29
End: 2024-04-29
Payer: MEDICAID

## 2024-04-29 VITALS
BODY MASS INDEX: 24.07 KG/M2 | WEIGHT: 140.19 LBS | SYSTOLIC BLOOD PRESSURE: 114 MMHG | DIASTOLIC BLOOD PRESSURE: 66 MMHG

## 2024-04-29 DIAGNOSIS — F41.9 ANXIETY DURING PREGNANCY IN SECOND TRIMESTER, ANTEPARTUM: ICD-10-CM

## 2024-04-29 DIAGNOSIS — O09.899 RUBELLA NON-IMMUNE STATUS, ANTEPARTUM: ICD-10-CM

## 2024-04-29 DIAGNOSIS — Z28.39 RUBELLA NON-IMMUNE STATUS, ANTEPARTUM: ICD-10-CM

## 2024-04-29 DIAGNOSIS — J45.909 ASTHMA AFFECTING PREGNANCY IN THIRD TRIMESTER: ICD-10-CM

## 2024-04-29 DIAGNOSIS — O99.513 ASTHMA AFFECTING PREGNANCY IN THIRD TRIMESTER: ICD-10-CM

## 2024-04-29 DIAGNOSIS — O99.342 ANXIETY DURING PREGNANCY IN SECOND TRIMESTER, ANTEPARTUM: ICD-10-CM

## 2024-04-29 DIAGNOSIS — O99.333 TOBACCO SMOKING AFFECTING PREGNANCY IN THIRD TRIMESTER: ICD-10-CM

## 2024-04-29 DIAGNOSIS — O99.810 ABNORMAL O'SULLIVAN GLUCOSE CHALLENGE TEST, ANTEPARTUM: Primary | ICD-10-CM

## 2024-04-29 DIAGNOSIS — Z3A.33 33 WEEKS GESTATION OF PREGNANCY: ICD-10-CM

## 2024-04-29 DIAGNOSIS — O99.013 ANEMIA DURING PREGNANCY IN THIRD TRIMESTER: ICD-10-CM

## 2024-04-29 DIAGNOSIS — O99.512 ASTHMA AFFECTING PREGNANCY IN SECOND TRIMESTER: ICD-10-CM

## 2024-04-29 DIAGNOSIS — Z3A.17 17 WEEKS GESTATION OF PREGNANCY: ICD-10-CM

## 2024-04-29 DIAGNOSIS — O36.5930 LIGHT FOR DATES AFFECTING MANAGEMENT OF MOTHER, THIRD TRIMESTER, NOT APPLICABLE OR UNSPECIFIED FETUS: ICD-10-CM

## 2024-04-29 DIAGNOSIS — O99.343 ANXIETY DURING PREGNANCY IN THIRD TRIMESTER, ANTEPARTUM: ICD-10-CM

## 2024-04-29 DIAGNOSIS — R79.89 LOW TSH LEVEL: ICD-10-CM

## 2024-04-29 DIAGNOSIS — J45.909 ASTHMA AFFECTING PREGNANCY IN SECOND TRIMESTER: ICD-10-CM

## 2024-04-29 DIAGNOSIS — O34.219 UTERINE SCAR FROM PREVIOUS CESAREAN DELIVERY AFFECTING PREGNANCY: Primary | ICD-10-CM

## 2024-04-29 DIAGNOSIS — O34.219 UTERINE SCAR FROM PREVIOUS CESAREAN DELIVERY AFFECTING PREGNANCY: ICD-10-CM

## 2024-04-29 DIAGNOSIS — F41.9 ANXIETY DURING PREGNANCY IN THIRD TRIMESTER, ANTEPARTUM: ICD-10-CM

## 2024-04-29 LAB
BILIRUB UR QL STRIP: NORMAL
GLUCOSE 1H P 100 G GLC PO SERPL-MCNC: 145 MG/DL (ref 100–180)
GLUCOSE UR QL STRIP: NEGATIVE
KETONES UR QL STRIP: NORMAL
LEUKOCYTE ESTERASE UR QL STRIP: NEGATIVE
PH, POC UA: NORMAL
POC BLOOD, URINE: NORMAL
POC NITRATES, URINE: NEGATIVE
PROT UR QL STRIP: NEGATIVE
SP GR UR STRIP: NORMAL (ref 1–1.03)
T3 SERPL-MCNC: 151.58 NG/DL (ref 60–180)
T4 FREE SERPL-MCNC: 0.87 NG/DL (ref 0.78–2.19)
TSH SERPL-ACNC: 1.2 UIU/ML (ref 0.36–3.74)
UROBILINOGEN UR STRIP-ACNC: NORMAL (ref 0.3–2.2)

## 2024-04-29 PROCEDURE — 82950 GLUCOSE TEST: CPT

## 2024-04-29 PROCEDURE — 84439 ASSAY OF FREE THYROXINE: CPT

## 2024-04-29 PROCEDURE — 84480 ASSAY TRIIODOTHYRONINE (T3): CPT

## 2024-04-29 PROCEDURE — 36415 COLL VENOUS BLD VENIPUNCTURE: CPT

## 2024-04-29 PROCEDURE — 99214 OFFICE O/P EST MOD 30 MIN: CPT | Mod: TH,,, | Performed by: OBSTETRICS & GYNECOLOGY

## 2024-04-29 PROCEDURE — 84443 ASSAY THYROID STIM HORMONE: CPT

## 2024-04-29 NOTE — TELEPHONE ENCOUNTER
----- Message from Davide Hood MD sent at 4/29/2024  4:53 PM CDT -----  Needs to do fasting 3 hr GTT ASAP

## 2024-04-30 ENCOUNTER — TELEPHONE (OUTPATIENT)
Dept: OBSTETRICS AND GYNECOLOGY | Facility: CLINIC | Age: 29
End: 2024-04-30
Payer: MEDICAID

## 2024-04-30 NOTE — TELEPHONE ENCOUNTER
Placed call to pt, notified and educated on importance of draw ASAP. Reports will go in the morning prior to MFM appt tomorrow. 3 hour GTT order in system.

## 2024-04-30 NOTE — TELEPHONE ENCOUNTER
----- Message from Wilma Bassett LPN sent at 4/29/2024  5:10 PM CDT -----  Attempted to notify pt x 1, no answer, no voicemail. Order placed for 3 hr GTT.

## 2024-05-01 ENCOUNTER — PROCEDURE VISIT (OUTPATIENT)
Dept: MATERNAL FETAL MEDICINE | Facility: CLINIC | Age: 29
End: 2024-05-01
Payer: MEDICAID

## 2024-05-01 ENCOUNTER — OFFICE VISIT (OUTPATIENT)
Dept: MATERNAL FETAL MEDICINE | Facility: CLINIC | Age: 29
End: 2024-05-01
Payer: MEDICAID

## 2024-05-01 ENCOUNTER — LAB VISIT (OUTPATIENT)
Dept: LAB | Facility: HOSPITAL | Age: 29
End: 2024-05-01
Attending: OBSTETRICS & GYNECOLOGY
Payer: MEDICAID

## 2024-05-01 VITALS
SYSTOLIC BLOOD PRESSURE: 112 MMHG | BODY MASS INDEX: 24.04 KG/M2 | DIASTOLIC BLOOD PRESSURE: 58 MMHG | WEIGHT: 140.81 LBS | HEART RATE: 81 BPM | HEIGHT: 64 IN

## 2024-05-01 DIAGNOSIS — O34.219 UTERINE SCAR FROM PREVIOUS CESAREAN DELIVERY AFFECTING PREGNANCY: ICD-10-CM

## 2024-05-01 DIAGNOSIS — J45.909 ASTHMA AFFECTING PREGNANCY IN THIRD TRIMESTER: ICD-10-CM

## 2024-05-01 DIAGNOSIS — O99.333 TOBACCO SMOKING AFFECTING PREGNANCY IN THIRD TRIMESTER: ICD-10-CM

## 2024-05-01 DIAGNOSIS — O99.343 ANXIETY DURING PREGNANCY IN THIRD TRIMESTER, ANTEPARTUM: Primary | ICD-10-CM

## 2024-05-01 DIAGNOSIS — Z3A.33 33 WEEKS GESTATION OF PREGNANCY: ICD-10-CM

## 2024-05-01 DIAGNOSIS — O36.5930 LIGHT FOR DATES AFFECTING MANAGEMENT OF MOTHER, THIRD TRIMESTER, NOT APPLICABLE OR UNSPECIFIED FETUS: ICD-10-CM

## 2024-05-01 DIAGNOSIS — O99.513 ASTHMA AFFECTING PREGNANCY IN THIRD TRIMESTER: ICD-10-CM

## 2024-05-01 DIAGNOSIS — F41.9 ANXIETY DURING PREGNANCY IN THIRD TRIMESTER, ANTEPARTUM: Primary | ICD-10-CM

## 2024-05-01 DIAGNOSIS — O36.5920 POOR FETAL GROWTH AFFECTING MANAGEMENT OF MOTHER IN SECOND TRIMESTER, SINGLE OR UNSPECIFIED FETUS: ICD-10-CM

## 2024-05-01 DIAGNOSIS — O99.013 ANEMIA DURING PREGNANCY IN THIRD TRIMESTER: ICD-10-CM

## 2024-05-01 DIAGNOSIS — O99.810 ABNORMAL O'SULLIVAN GLUCOSE CHALLENGE TEST, ANTEPARTUM: ICD-10-CM

## 2024-05-01 DIAGNOSIS — Z28.39 RUBELLA NON-IMMUNE STATUS, ANTEPARTUM: ICD-10-CM

## 2024-05-01 DIAGNOSIS — O09.899 RUBELLA NON-IMMUNE STATUS, ANTEPARTUM: ICD-10-CM

## 2024-05-01 PROBLEM — R79.89 LOW TSH LEVEL: Status: RESOLVED | Noted: 2024-01-02 | Resolved: 2024-05-01

## 2024-05-01 LAB
GLUCOSE 1H P 100 G GLC PO SERPL-MCNC: 169 MG/DL (ref 100–180)
GLUCOSE 2H P 100 G GLC PO SERPL-MCNC: 75 MG/DL
GLUCOSE 3H P 100 G GLC PO SERPL-MCNC: 43 MG/DL
GLUCOSE P FAST SERPL-MCNC: 89 MG/DL (ref 70–115)
POCT GLUCOSE: 87 MG/DL (ref 70–110)

## 2024-05-01 PROCEDURE — 99213 OFFICE O/P EST LOW 20 MIN: CPT | Mod: TH,S$GLB,, | Performed by: OBSTETRICS & GYNECOLOGY

## 2024-05-01 PROCEDURE — 76816 OB US FOLLOW-UP PER FETUS: CPT | Mod: S$GLB,,, | Performed by: OBSTETRICS & GYNECOLOGY

## 2024-05-01 PROCEDURE — 3008F BODY MASS INDEX DOCD: CPT | Mod: CPTII,S$GLB,, | Performed by: OBSTETRICS & GYNECOLOGY

## 2024-05-01 PROCEDURE — 82951 GLUCOSE TOLERANCE TEST (GTT): CPT

## 2024-05-01 PROCEDURE — 1159F MED LIST DOCD IN RCRD: CPT | Mod: CPTII,S$GLB,, | Performed by: OBSTETRICS & GYNECOLOGY

## 2024-05-01 PROCEDURE — 3074F SYST BP LT 130 MM HG: CPT | Mod: CPTII,S$GLB,, | Performed by: OBSTETRICS & GYNECOLOGY

## 2024-05-01 PROCEDURE — 82950 GLUCOSE TEST: CPT

## 2024-05-01 PROCEDURE — 82947 ASSAY GLUCOSE BLOOD QUANT: CPT

## 2024-05-01 PROCEDURE — 3078F DIAST BP <80 MM HG: CPT | Mod: CPTII,S$GLB,, | Performed by: OBSTETRICS & GYNECOLOGY

## 2024-05-01 PROCEDURE — 82952 GTT-ADDED SAMPLES: CPT

## 2024-05-01 PROCEDURE — 76819 FETAL BIOPHYS PROFIL W/O NST: CPT | Mod: S$GLB,,, | Performed by: OBSTETRICS & GYNECOLOGY

## 2024-05-01 PROCEDURE — 76820 UMBILICAL ARTERY ECHO: CPT | Mod: S$GLB,,, | Performed by: OBSTETRICS & GYNECOLOGY

## 2024-05-01 PROCEDURE — 1160F RVW MEDS BY RX/DR IN RCRD: CPT | Mod: CPTII,S$GLB,, | Performed by: OBSTETRICS & GYNECOLOGY

## 2024-05-01 PROCEDURE — 36415 COLL VENOUS BLD VENIPUNCTURE: CPT

## 2024-05-01 RX ORDER — TRIAMCINOLONE ACETONIDE 0.25 MG/G
CREAM TOPICAL
COMMUNITY
Start: 2024-04-26 | End: 2024-05-27

## 2024-05-01 RX ORDER — FOLIC ACID 1 MG/1
1 TABLET ORAL DAILY
Qty: 30 TABLET | Refills: 3 | Status: SHIPPED | OUTPATIENT
Start: 2024-05-01 | End: 2024-06-10 | Stop reason: ALTCHOICE

## 2024-05-01 RX ORDER — FERROUS SULFATE 325(65) MG
325 TABLET ORAL 2 TIMES DAILY
Qty: 60 TABLET | Refills: 3 | Status: SHIPPED | OUTPATIENT
Start: 2024-05-01 | End: 2024-06-10 | Stop reason: ALTCHOICE

## 2024-05-01 RX ORDER — BLACK COHOSH ROOT 200 MG
250 CAPSULE ORAL 2 TIMES DAILY
Qty: 60 EACH | Refills: 3 | Status: SHIPPED | OUTPATIENT
Start: 2024-05-01 | End: 2024-05-31

## 2024-05-01 NOTE — PROGRESS NOTES
Maternal Fetal Medicine Follow Up    Subjective:     Patient ID: [id number]    Chief Complaint: visit w/ rpt (Pt denies any complications)      HPI: Jamia Smith is a 28 y.o. female  at 34w1d gestation with Estimated Date of Delivery: 24  who is here for follow-up consultation by [facility].    She had concern for fetal growth restriction on outside ultrasound, with low-normal fetal growth seen on consult ultrasound.  She had negative cell free DNA.  She has history of anxiety and is not currently on any medication and feeling well with no acute symptoms.  She has history of asthma, not currently on any medication and has not had any exacerbations recently with no recent use of albuterol.  Her prenatal labs showed that she was rubella nonimmune.  She used to smoke half a pack of cigarettes a day but quit completely early April .  She was noted to be mildly anemic on 3/20/2024 with H&H 10.9/32.8.      Patient had a 3 hour glucose tolerance test this morning and that was reviewed and was normal.    Patient is here to recheck fetal growth today.  She is accompanied by her partner Malik.       Interval history since last [facility] visit: None.. She denies any leaking fluid, vaginal bleeding, contractions, decreased fetal movement. Denies headaches, visual disturbances, or epigastric pain.    Pregnancy complications include:   Patient Active Problem List   Diagnosis    Asthma    Mild depression    Anemia during pregnancy in third trimester    Asthma affecting pregnancy in third trimester    Anxiety during pregnancy in third trimester, antepartum    Uterine scar from previous  delivery affecting pregnancy    Rubella non-immune status, antepartum    Tobacco smoking affecting pregnancy in third trimester    Light for dates affecting management of mother, third trimester, not applicable or unspecified fetus       No changes to medical, surgical, family, social, or obstetric history.    Medications:  Current  "Outpatient Medications   Medication Instructions    ferrous sulfate (FEOSOL) 325 mg, Oral, 2 times daily    folic acid (FOLVITE) 1 mg, Oral, Daily    PNV,calcium 72/iron/folic acid (PRENATAL VITAMIN) Tab 1 tablet, Oral, Daily    triamcinolone acetonide 0.025% (KENALOG) 0.025 % cream Topical (Top)    VITAMIN C 250 mg, Oral, 2 times daily       Review of Systems   12 point review of systems conducted, negative except as stated in the history of present illness. See HPI for details.      Objective:     Visit Vitals  BP (!) 112/58   Pulse 81   Ht 5' 4" (1.626 m)   Wt 63.9 kg (140 lb 12.8 oz)   LMP 2023   BMI 24.17 kg/m²        Physical Exam  Vitals and nursing note reviewed.   Constitutional:       Appearance: Normal appearance.   HENT:      Head: Normocephalic and atraumatic.      Nose: Nose normal. No congestion.   Cardiovascular:      Rate and Rhythm: Normal rate.   Pulmonary:      Effort: Pulmonary effort is normal.   Skin:     Findings: No rash.   Neurological:      Mental Status: She is alert and oriented to person, place, and time.   Psychiatric:         Mood and Affect: Mood normal.         Behavior: Behavior normal.         Thought Content: Thought content normal.         Judgment: Judgment normal.         Assessment/Plan:     28 y.o.  female with IUP at 34w1d    Low normal fetal growth  There is normal fetal growth with an EFW of 2153 g at the 17% and the AC at the 47% on 3/27/2024.  AFV is normal.   BPP 88 with normal umbilical artery Doppler    Causes of fetal growth restriction were discussed briefly.  Discussed options for prenatal genetic testing.  Patient already had negative cell free DNA.  Discussed risks and benefits of amniocentesis.  Amniocentesis was offered and patient declined.  Reviewed need for  evaluation.    With continued normal fetal growth no follow-up was scheduled today with the patient do fetal kick counts and keep her follow-up with Dr. Hood.      Anxiety on no " medication  With no symptoms at this time, it was agreed to stay off the medication at this time with patient to report symptoms of depression recurrence. If she experiences any SI/HI tendencies, she is to report it immediately to provider/ER for prompt intervention. She was advised to continue follow up care with her Mental Health provider.       Smoking in pregnancy  Reviewed with her adverse  outcomes associated with smoking in pregnancy, including miscarriage, FGR, placenta previa,  delivery, PPROM, low birthweight, and placental abruption, with associated risks of  mortality. She was again urged to quit smoking.          Asthma, on no medication  Reviewed the risks with asthma in pregnancy, including  birth, preeclampsia, FGR, and maternal/ morbidity.    With symptoms controlled, can use albuterol on a p.r.n. basis. If she needs to use the albuterol more than three times a week she may benefit from additional medication like inhaled steroids. She was advised to keep a logbook of attacks and the need for inhaler treatments       Rubella nonimmune status  Recommend patient receive MMR vaccine postpartum as it is contraindicated in pregnancy.  Expectant management.      Anemia in pregnancy  The World Health Organization (WHO) defines anemia as a hemoglobin level <11 g/dL (approximately equivalent to a hematocrit <33 percent) in the first trimester, <10.5 g/dL in the second trimester, <10.5 to 11 g/dL in the third trimester, or <10 g/dL postpartum. Anemia affects approximately 30 percent of reproductive-age females and 40 percent of pregnant individuals, mostly due to iron deficiency.       Physiologic anemia of pregnancy and iron deficiency are the two most common causes of anemia in pregnancy. Much less common causes of anemia include hemoglobinopathies (sickle cell, thalassemia), RBC membrane disorders (hereditary spherocytosis and had hereditary Elliptocytosis), and  acquired anemias (folate deficiency, vitamin B12 deficiency, other vitamin deficiencies, autoimmune hemolytic anemia Anemia, hypothyroidism and chronic kidney disease). All gravidas with anemia or symptoms of anemia should have prompt testing for iron deficiency because iron deficiency can progress to anemia; iron deficiency is the most common pathologic cause of anemia in pregnancy.    I discussed with her that iron deficiency during the first two trimesters of pregnancy is associated with a 2-fold increased risk for  delivery and a 3-fold increased risk for delivering a low-birth-weight baby.    We will start the patient oral hematinic therapy with ferrous sulfate 325 mg BID, vitamin-C 250 mg BID, and folic acid 1 mg daily.  Recommend intermittent CBC throughout pregnancy to assess response to therapy.     Follow up for No follow up scheduled.  Keep F/U with primary OB.     Future Appointments   Date Time Provider Department Center   2024 10:30 AM Davide Hood MD [facility]      Patient was evaluated and examined by Dr. Urbina. FADI Tan, helped in pre charting of part of note.     This note was created with the assistance of [employer] voice recognition software. There may be transcription errors as a result of using this technology, however minimal. Effort has been made to ensure accuracy of transcription, but any obvious errors or omissions should be clarified with the author of the document.

## 2024-05-07 ENCOUNTER — PATIENT MESSAGE (OUTPATIENT)
Dept: OTHER | Facility: OTHER | Age: 29
End: 2024-05-07
Payer: MEDICAID

## 2024-05-13 ENCOUNTER — ROUTINE PRENATAL (OUTPATIENT)
Dept: OBSTETRICS AND GYNECOLOGY | Facility: CLINIC | Age: 29
End: 2024-05-13
Payer: MEDICAID

## 2024-05-13 VITALS
SYSTOLIC BLOOD PRESSURE: 120 MMHG | WEIGHT: 139.13 LBS | BODY MASS INDEX: 23.88 KG/M2 | DIASTOLIC BLOOD PRESSURE: 70 MMHG

## 2024-05-13 DIAGNOSIS — F41.9 ANXIETY DURING PREGNANCY IN THIRD TRIMESTER, ANTEPARTUM: Primary | ICD-10-CM

## 2024-05-13 DIAGNOSIS — Z3A.35 35 WEEKS GESTATION OF PREGNANCY: ICD-10-CM

## 2024-05-13 DIAGNOSIS — J45.909 ASTHMA AFFECTING PREGNANCY IN THIRD TRIMESTER: ICD-10-CM

## 2024-05-13 DIAGNOSIS — O99.513 ASTHMA AFFECTING PREGNANCY IN THIRD TRIMESTER: ICD-10-CM

## 2024-05-13 DIAGNOSIS — O99.343 ANXIETY DURING PREGNANCY IN THIRD TRIMESTER, ANTEPARTUM: Primary | ICD-10-CM

## 2024-05-13 DIAGNOSIS — O36.5930 LIGHT FOR DATES AFFECTING MANAGEMENT OF MOTHER, THIRD TRIMESTER, NOT APPLICABLE OR UNSPECIFIED FETUS: ICD-10-CM

## 2024-05-13 DIAGNOSIS — O99.013 ANEMIA DURING PREGNANCY IN THIRD TRIMESTER: ICD-10-CM

## 2024-05-13 DIAGNOSIS — O34.219 UTERINE SCAR FROM PREVIOUS CESAREAN DELIVERY AFFECTING PREGNANCY: ICD-10-CM

## 2024-05-13 PROCEDURE — 99213 OFFICE O/P EST LOW 20 MIN: CPT | Mod: TH,,, | Performed by: OBSTETRICS & GYNECOLOGY

## 2024-05-13 NOTE — PROGRESS NOTES
HPI  28 y.o.  at 35w6d here for OB check.  Reports positive fetal movement. denies LOF, CTX. Reports pelvic pressure.       ROS  Review of Systems   Constitutional:  Negative for chills and fever.   Gastrointestinal:  Negative for abdominal pain, constipation and diarrhea.   Genitourinary:  Negative for flank pain, genital sores, pelvic pain, urgency, vaginal bleeding, vaginal discharge, vaginal pain, postcoital bleeding and vaginal odor.         OBJECTIVE  /70   Wt 63.1 kg (139 lb 1.8 oz)   LMP 2023   BMI 23.88 kg/m²     General Exam:    General Appearance: alert, in no acute distress, normal, well nourished.  Psych:  Orientation: time, place, person.  Mood/Affect: Normal   Abdomen:  - Soft. Non-tender. No rebound tenderness or guardingNo masses. Liver normal. Spleen normal. No hernia palpable.  Pelvis:   - Vulva: Normal   - Perianal skin: Normal   - Urethra: Normal meatus. Q-tip: Not performed   - Vagina: NormalVaginal discharge present: . Cystocele: Absent. Rectocele: Absent   - Cervix: Normal. Cervical motion tenderness Absent   - Uterus:  Non-tender.   - Adnexal: Normal      FHT: 160s  Cervix: 0/0/-3    ASSESSMENT  1. Anxiety during pregnancy in third trimester, antepartum    2. 35 weeks gestation of pregnancy    3. Uterine scar from previous  delivery affecting pregnancy    4. Anemia during pregnancy in third trimester    5. Light for dates affecting management of mother, third trimester, not applicable or unspecified fetus    6. Asthma affecting pregnancy in third trimester        PLAN  Reviewed standard labor unit and kick count precautions.  Discussed pre-eclampsia precautions.  Discussed COVID-19 risks, social distancing, and isolation if respiratory symptoms develop.     MFM note 24;  There is normal fetal growth with an EFW of 2153 g at the 17% and the AC at the 47% on 3/27/2024.  AFV is normal.   BPP 8/8 with normal umbilical artery Doppler  Cont Fe  No further MFM appt     Precautions     Plan for delivery 6/6/24    RTC 1 week pre admit     This note was transcribed by Obdulia Del Angel. There may be transcription errors as a result, however minimal. Effort has been made to ensure accuracy of transcription, but any obvious errors or omissions should be clarified with the author of the document.

## 2024-05-15 NOTE — PROGRESS NOTES
History & Physical    Subjective     History of Present Illness:  Patient is a 28 y.o. female  at 36w6d IVANNA 24 here today for ob pre admit. Normal fetal movement. denies LOF, CTX.     Chief Complaint   Patient presents with    Routine Prenatal Visit     Pre admit.        Review of patient's allergies indicates:   Allergen Reactions    Iodinated contrast media Shortness Of Breath    Iodine Rash and Swelling    Shrimp        Current Outpatient Medications   Medication Sig Dispense Refill    ascorbic acid, vitamin C, (VITAMIN C) 500 mg TbSR Take 250 mg by mouth 2 (two) times a day. 60 each 3    ferrous sulfate (FEOSOL) 325 mg (65 mg iron) Tab tablet Take 1 tablet (325 mg total) by mouth 2 (two) times daily. 60 tablet 3    folic acid (FOLVITE) 1 MG tablet Take 1 tablet (1 mg total) by mouth once daily. 30 tablet 3    PNV,calcium 72/iron/folic acid (PRENATAL VITAMIN) Tab Take 1 tablet by mouth once daily. 30 tablet 1    triamcinolone acetonide 0.025% (KENALOG) 0.025 % cream Apply topically. (Patient not taking: Reported on 2024)       No current facility-administered medications for this visit.       Past Medical History:   Diagnosis Date    Anxiety disorder, unspecified     not currently    Asthma     Dormant    H. pylori infection     Marijuana user 2023    Vitamin D deficiency      Past Surgical History:   Procedure Laterality Date     SECTION N/A 2023    Procedure:  SECTION;  Surgeon: Davide Hood MD;  Location: [facility];  Service: OB/GYN;  Laterality: N/A;    CHOLECYSTECTOMY       Family History   Problem Relation Name Age of Onset    No Known Problems Paternal Grandfather      No Known Problems Paternal Grandmother      No Known Problems Maternal Grandmother      No Known Problems Maternal Grandfather      No Known Problems Father      No Known Problems Mother      No Known Problems Brother      No Known Problems Sister      Breast cancer Neg Hx      Cervical  "cancer Neg Hx      Uterine cancer Neg Hx      Ovarian cancer Neg Hx      Colon cancer Neg Hx       Social History     Tobacco Use    Smoking status: Former     Types: Cigarettes     Start date: 2024     Quit date: 2016     Years since quittin.3     Passive exposure: Current    Smokeless tobacco: Never    Tobacco comments:     3/21/23 per pt "I smoke  only 1-3 cigarettes a day."     Recently quit   Substance Use Topics    Alcohol use: Not Currently    Drug use: Not Currently     Types: Marijuana        Review of Systems:  Review of Systems   Respiratory: Negative.     Cardiovascular: Negative.    Gastrointestinal: Negative.    Genitourinary: Negative.    All other systems reviewed and are negative.         Objective     Vital Signs (Most Recent)  BP: 110/70 (24 0950)     64.9 kg (143 lb)     Physical Exam:  Physical Exam  Gen: No distress  Abdomen: Gravid, non-tender  Extremities: No edema  Cervix: 1.5/20/-3  FHT:140s           Assessment and Plan   1. Uterine scar from previous  delivery affecting pregnancy  - Case Request Operating Room:  SECTION  - Admit to Inpatient; Standing  - Vital signs; Standing  - Verify informed consent; Standing  - Fetal monitoring WITH contraction monitoring; Standing  - Avina to Gravity; Standing  - Insert peripheral IV; Standing  - Chlorhexidine (CHG) 2% Wipes; Standing  - Clip and Prep Suprapubic; Standing  - Notify Physician; Standing  - Diet NPO; Standing  - Place sequential compression device; Standing  - miSOPROStoL tablet 1,000 mcg  - Chlorohexidine Gluconate Bath; Standing  - Type & Screen; Standing  - RAPID HIV; Standing  - CBC auto differential; Standing  - Type & Screen; Standing  - Hepatitis B surface antigen; Standing  - Drug Screen, Urine; Standing  - Inpatient consult to Anesthesiology; Standing  - Full code; Standing    2. 36 weeks gestation of pregnancy  - POCT Urinalysis, Dipstick, Automated, W/O Scope  - Strep Group B by PCR; " Future  - C.trach/N.gonor AMP RNA; Future  - Trichomonas vaginalis Amplified RNA; Future  - C.trach/N.gonor AMP RNA  - Trichomonas vaginalis Amplified RNA  - Strep Group B by PCR    3. Anxiety during pregnancy in third trimester, antepartum  - Case Request Operating Room:  SECTION  - Admit to Inpatient; Standing  - Vital signs; Standing  - Verify informed consent; Standing  - Fetal monitoring WITH contraction monitoring; Standing  - Avina to Gravity; Standing  - Insert peripheral IV; Standing  - Chlorhexidine (CHG) 2% Wipes; Standing  - Clip and Prep Suprapubic; Standing  - Notify Physician; Standing  - Diet NPO; Standing  - Place sequential compression device; Standing  - miSOPROStoL tablet 1,000 mcg  - Chlorohexidine Gluconate Bath; Standing  - Type & Screen; Standing  - RAPID HIV; Standing  - CBC auto differential; Standing  - Type & Screen; Standing  - Hepatitis B surface antigen; Standing  - Drug Screen, Urine; Standing  - Inpatient consult to Anesthesiology; Standing  - Full code; Standing    4. Rubella non-immune status, antepartum  - Case Request Operating Room:  SECTION  - Admit to Inpatient; Standing  - Vital signs; Standing  - Verify informed consent; Standing  - Fetal monitoring WITH contraction monitoring; Standing  - Avina to Gravity; Standing  - Insert peripheral IV; Standing  - Chlorhexidine (CHG) 2% Wipes; Standing  - Clip and Prep Suprapubic; Standing  - Notify Physician; Standing  - Diet NPO; Standing  - Place sequential compression device; Standing  - miSOPROStoL tablet 1,000 mcg  - Chlorohexidine Gluconate Bath; Standing  - Type & Screen; Standing  - RAPID HIV; Standing  - CBC auto differential; Standing  - Type & Screen; Standing  - Hepatitis B surface antigen; Standing  - Drug Screen, Urine; Standing  - Inpatient consult to Anesthesiology; Standing  - Full code; Standing    5. Asthma affecting pregnancy in third trimester  - Case Request Operating Room:  SECTION  -  Admit to Inpatient; Standing  - Vital signs; Standing  - Verify informed consent; Standing  - Fetal monitoring WITH contraction monitoring; Standing  - Avina to Gravity; Standing  - Insert peripheral IV; Standing  - Chlorhexidine (CHG) 2% Wipes; Standing  - Clip and Prep Suprapubic; Standing  - Notify Physician; Standing  - Diet NPO; Standing  - Place sequential compression device; Standing  - miSOPROStoL tablet 1,000 mcg  - Chlorohexidine Gluconate Bath; Standing  - Type & Screen; Standing  - RAPID HIV; Standing  - CBC auto differential; Standing  - Type & Screen; Standing  - Hepatitis B surface antigen; Standing  - Drug Screen, Urine; Standing  - Inpatient consult to Anesthesiology; Standing  - Full code; Standing    6. Tobacco smoking affecting pregnancy in third trimester  - Case Request Operating Room:  SECTION  - Admit to Inpatient; Standing  - Vital signs; Standing  - Verify informed consent; Standing  - Fetal monitoring WITH contraction monitoring; Standing  - Avina to Gravity; Standing  - Insert peripheral IV; Standing  - Chlorhexidine (CHG) 2% Wipes; Standing  - Clip and Prep Suprapubic; Standing  - Notify Physician; Standing  - Diet NPO; Standing  - Place sequential compression device; Standing  - miSOPROStoL tablet 1,000 mcg  - Chlorohexidine Gluconate Bath; Standing  - Type & Screen; Standing  - RAPID HIV; Standing  - CBC auto differential; Standing  - Type & Screen; Standing  - Hepatitis B surface antigen; Standing  - Drug Screen, Urine; Standing  - Inpatient consult to Anesthesiology; Standing  - Full code; Standing        PLAN:    Discussed delivery process and plan.  Consent given for delivery.   Discussed labor unit, kick count, pre-eclampsia, rupture   membrane precautions.   GBS  GC CZ TV collected on urine     Reviewed standard labor unit and kick count precautions.  Discussed pre-eclampsia precautions.  Discussed COVID-19 risks, social distancing, and isolation if respiratory symptoms  develop.     Cont Fe  Precautions      Plan for delivery 6/6/24    RTC 1 week ob check     This note was transcribed by Obdulia Del Angel. There may be transcription errors as a result, however minimal. Effort has been made to ensure accuracy of transcription, but any obvious errors or omissions should be clarified with the author of the document.

## 2024-05-15 NOTE — H&P (VIEW-ONLY)
History & Physical    Subjective     History of Present Illness:  Patient is a 28 y.o. female  at 36w6d [facility] here today for ob pre admit. Normal fetal movement. denies LOF, CTX.     Chief Complaint   Patient presents with    Routine Prenatal Visit     Pre admit.        Review of patient's allergies indicates:   Allergen Reactions    Iodinated contrast media Shortness Of Breath    Iodine Rash and Swelling    Shrimp        Current Outpatient Medications   Medication Sig Dispense Refill    ascorbic acid, vitamin C, (VITAMIN C) 500 mg TbSR Take 250 mg by mouth 2 (two) times a day. 60 each 3    ferrous sulfate (FEOSOL) 325 mg (65 mg iron) Tab tablet Take 1 tablet (325 mg total) by mouth 2 (two) times daily. 60 tablet 3    folic acid (FOLVITE) 1 MG tablet Take 1 tablet (1 mg total) by mouth once daily. 30 tablet 3    PNV,calcium 72/iron/folic acid (PRENATAL VITAMIN) Tab Take 1 tablet by mouth once daily. 30 tablet 1    triamcinolone acetonide 0.025% (KENALOG) 0.025 % cream Apply topically. (Patient not taking: Reported on 2024)       No current facility-administered medications for this visit.       Past Medical History:   Diagnosis Date    Anxiety disorder, unspecified     not currently    Asthma     Dormant    H. pylori infection     Marijuana user 2023    Vitamin D deficiency      Past Surgical History:   Procedure Laterality Date     SECTION N/A 2023    Procedure:  SECTION;  Surgeon: Davide Hood MD;  Location: [facility];  Service: OB/GYN;  Laterality: N/A;    CHOLECYSTECTOMY       Family History   Problem Relation Name Age of Onset    No Known Problems Paternal Grandfather      No Known Problems Paternal Grandmother      No Known Problems Maternal Grandmother      No Known Problems Maternal Grandfather      No Known Problems Father      No Known Problems Mother      No Known Problems Brother      No Known Problems Sister      Breast cancer Neg Hx      Cervical  "cancer Neg Hx      Uterine cancer Neg Hx      Ovarian cancer Neg Hx      Colon cancer Neg Hx       Social History     Tobacco Use    Smoking status: Former     Types: Cigarettes     Start date: 2024     Quit date: 2016     Years since quittin.3     Passive exposure: Current    Smokeless tobacco: Never    Tobacco comments:     3/21/23 per pt "I smoke  only 1-3 cigarettes a day."     Recently quit   Substance Use Topics    Alcohol use: Not Currently    Drug use: Not Currently     Types: Marijuana        Review of Systems:  Review of Systems   Respiratory: Negative.     Cardiovascular: Negative.    Gastrointestinal: Negative.    Genitourinary: Negative.    All other systems reviewed and are negative.         Objective     Vital Signs (Most Recent)  BP: 110/70 (24 0950)     64.9 kg (143 lb)     Physical Exam:  Physical Exam  Gen: No distress  Abdomen: Gravid, non-tender  Extremities: No edema  Cervix: 1.5/20/-3  FHT:140s           Assessment and Plan   1. Uterine scar from previous  delivery affecting pregnancy  - Case Request Operating Room:  SECTION  - Admit to Inpatient; Standing  - Vital signs; Standing  - Verify informed consent; Standing  - Fetal monitoring WITH contraction monitoring; Standing  - Avina to Gravity; Standing  - Insert peripheral IV; Standing  - Chlorhexidine (CHG) 2% Wipes; Standing  - Clip and Prep Suprapubic; Standing  - Notify Physician; Standing  - Diet NPO; Standing  - Place sequential compression device; Standing  - miSOPROStoL tablet 1,000 mcg  - Chlorohexidine Gluconate Bath; Standing  - Type & Screen; Standing  - RAPID HIV; Standing  - CBC auto differential; Standing  - Type & Screen; Standing  - Hepatitis B surface antigen; Standing  - Drug Screen, Urine; Standing  - Inpatient consult to Anesthesiology; Standing  - Full code; Standing    2. 36 weeks gestation of pregnancy  - POCT Urinalysis, Dipstick, Automated, W/O Scope  - Strep Group B by PCR; " Future  - C.trach/N.gonor AMP RNA; Future  - Trichomonas vaginalis Amplified RNA; Future  - C.trach/N.gonor AMP RNA  - Trichomonas vaginalis Amplified RNA  - Strep Group B by PCR    3. Anxiety during pregnancy in third trimester, antepartum  - Case Request Operating Room:  SECTION  - Admit to Inpatient; Standing  - Vital signs; Standing  - Verify informed consent; Standing  - Fetal monitoring WITH contraction monitoring; Standing  - Avina to Gravity; Standing  - Insert peripheral IV; Standing  - Chlorhexidine (CHG) 2% Wipes; Standing  - Clip and Prep Suprapubic; Standing  - Notify Physician; Standing  - Diet NPO; Standing  - Place sequential compression device; Standing  - miSOPROStoL tablet 1,000 mcg  - Chlorohexidine Gluconate Bath; Standing  - Type & Screen; Standing  - RAPID HIV; Standing  - CBC auto differential; Standing  - Type & Screen; Standing  - Hepatitis B surface antigen; Standing  - Drug Screen, Urine; Standing  - Inpatient consult to Anesthesiology; Standing  - Full code; Standing    4. Rubella non-immune status, antepartum  - Case Request Operating Room:  SECTION  - Admit to Inpatient; Standing  - Vital signs; Standing  - Verify informed consent; Standing  - Fetal monitoring WITH contraction monitoring; Standing  - Avina to Gravity; Standing  - Insert peripheral IV; Standing  - Chlorhexidine (CHG) 2% Wipes; Standing  - Clip and Prep Suprapubic; Standing  - Notify Physician; Standing  - Diet NPO; Standing  - Place sequential compression device; Standing  - miSOPROStoL tablet 1,000 mcg  - Chlorohexidine Gluconate Bath; Standing  - Type & Screen; Standing  - RAPID HIV; Standing  - CBC auto differential; Standing  - Type & Screen; Standing  - Hepatitis B surface antigen; Standing  - Drug Screen, Urine; Standing  - Inpatient consult to Anesthesiology; Standing  - Full code; Standing    5. Asthma affecting pregnancy in third trimester  - Case Request Operating Room:  SECTION  -  Admit to Inpatient; Standing  - Vital signs; Standing  - Verify informed consent; Standing  - Fetal monitoring WITH contraction monitoring; Standing  - Avina to Gravity; Standing  - Insert peripheral IV; Standing  - Chlorhexidine (CHG) 2% Wipes; Standing  - Clip and Prep Suprapubic; Standing  - Notify Physician; Standing  - Diet NPO; Standing  - Place sequential compression device; Standing  - miSOPROStoL tablet 1,000 mcg  - Chlorohexidine Gluconate Bath; Standing  - Type & Screen; Standing  - RAPID HIV; Standing  - CBC auto differential; Standing  - Type & Screen; Standing  - Hepatitis B surface antigen; Standing  - Drug Screen, Urine; Standing  - Inpatient consult to Anesthesiology; Standing  - Full code; Standing    6. Tobacco smoking affecting pregnancy in third trimester  - Case Request Operating Room:  SECTION  - Admit to Inpatient; Standing  - Vital signs; Standing  - Verify informed consent; Standing  - Fetal monitoring WITH contraction monitoring; Standing  - Avina to Gravity; Standing  - Insert peripheral IV; Standing  - Chlorhexidine (CHG) 2% Wipes; Standing  - Clip and Prep Suprapubic; Standing  - Notify Physician; Standing  - Diet NPO; Standing  - Place sequential compression device; Standing  - miSOPROStoL tablet 1,000 mcg  - Chlorohexidine Gluconate Bath; Standing  - Type & Screen; Standing  - RAPID HIV; Standing  - CBC auto differential; Standing  - Type & Screen; Standing  - Hepatitis B surface antigen; Standing  - Drug Screen, Urine; Standing  - Inpatient consult to Anesthesiology; Standing  - Full code; Standing        PLAN:    Discussed delivery process and plan.  Consent given for delivery.   Discussed labor unit, kick count, pre-eclampsia, rupture   membrane precautions.   GBS  GC CZ TV collected on urine     Reviewed standard labor unit and kick count precautions.  Discussed pre-eclampsia precautions.  Discussed COVID-19 risks, social distancing, and isolation if respiratory symptoms  develop.     Cont Fe  Precautions      Plan for delivery 6/6/24    RTC 1 week ob check     This note was transcribed by Obdulia Del Angel. There may be transcription errors as a result, however minimal. Effort has been made to ensure accuracy of transcription, but any obvious errors or omissions should be clarified with the author of the document.

## 2024-05-20 ENCOUNTER — ROUTINE PRENATAL (OUTPATIENT)
Dept: OBSTETRICS AND GYNECOLOGY | Facility: CLINIC | Age: 29
End: 2024-05-20
Payer: MEDICAID

## 2024-05-20 VITALS — BODY MASS INDEX: 24.55 KG/M2 | WEIGHT: 143 LBS | DIASTOLIC BLOOD PRESSURE: 70 MMHG | SYSTOLIC BLOOD PRESSURE: 110 MMHG

## 2024-05-20 DIAGNOSIS — J45.909 ASTHMA AFFECTING PREGNANCY IN THIRD TRIMESTER: ICD-10-CM

## 2024-05-20 DIAGNOSIS — O34.219 UTERINE SCAR FROM PREVIOUS CESAREAN DELIVERY AFFECTING PREGNANCY: Primary | ICD-10-CM

## 2024-05-20 DIAGNOSIS — O99.513 ASTHMA AFFECTING PREGNANCY IN THIRD TRIMESTER: ICD-10-CM

## 2024-05-20 DIAGNOSIS — O09.899 RUBELLA NON-IMMUNE STATUS, ANTEPARTUM: ICD-10-CM

## 2024-05-20 DIAGNOSIS — F41.9 ANXIETY DURING PREGNANCY IN THIRD TRIMESTER, ANTEPARTUM: ICD-10-CM

## 2024-05-20 DIAGNOSIS — O99.333 TOBACCO SMOKING AFFECTING PREGNANCY IN THIRD TRIMESTER: ICD-10-CM

## 2024-05-20 DIAGNOSIS — Z28.39 RUBELLA NON-IMMUNE STATUS, ANTEPARTUM: ICD-10-CM

## 2024-05-20 DIAGNOSIS — Z3A.36 36 WEEKS GESTATION OF PREGNANCY: ICD-10-CM

## 2024-05-20 DIAGNOSIS — O99.343 ANXIETY DURING PREGNANCY IN THIRD TRIMESTER, ANTEPARTUM: ICD-10-CM

## 2024-05-20 PROCEDURE — 87661 TRICHOMONAS VAGINALIS AMPLIF: CPT | Performed by: OBSTETRICS & GYNECOLOGY

## 2024-05-20 PROCEDURE — 99214 OFFICE O/P EST MOD 30 MIN: CPT | Mod: TH,,, | Performed by: OBSTETRICS & GYNECOLOGY

## 2024-05-20 PROCEDURE — 87591 N.GONORRHOEAE DNA AMP PROB: CPT | Performed by: OBSTETRICS & GYNECOLOGY

## 2024-05-20 PROCEDURE — 87653 STREP B DNA AMP PROBE: CPT | Performed by: OBSTETRICS & GYNECOLOGY

## 2024-05-20 RX ORDER — CARBOPROST TROMETHAMINE 250 UG/ML
250 INJECTION, SOLUTION INTRAMUSCULAR
Status: CANCELLED | OUTPATIENT
Start: 2024-05-20

## 2024-05-20 RX ORDER — CEFAZOLIN SODIUM 2 G/50ML
2 SOLUTION INTRAVENOUS ONCE AS NEEDED
Status: CANCELLED | OUTPATIENT
Start: 2024-05-20 | End: 2035-10-17

## 2024-05-20 RX ORDER — FAMOTIDINE 10 MG/ML
20 INJECTION INTRAVENOUS
Status: CANCELLED | OUTPATIENT
Start: 2024-05-20

## 2024-05-20 RX ORDER — MISOPROSTOL 100 UG/1
1000 TABLET ORAL
Status: CANCELLED | OUTPATIENT
Start: 2024-05-20

## 2024-05-20 RX ORDER — METHYLERGONOVINE MALEATE 0.2 MG/ML
200 INJECTION INTRAVENOUS
Status: CANCELLED | OUTPATIENT
Start: 2024-05-20

## 2024-05-20 RX ORDER — SODIUM CHLORIDE, SODIUM LACTATE, POTASSIUM CHLORIDE, CALCIUM CHLORIDE 600; 310; 30; 20 MG/100ML; MG/100ML; MG/100ML; MG/100ML
INJECTION, SOLUTION INTRAVENOUS CONTINUOUS
Status: CANCELLED | OUTPATIENT
Start: 2024-05-20

## 2024-05-20 RX ORDER — CLINDAMYCIN PHOSPHATE 900 MG/50ML
900 INJECTION, SOLUTION INTRAVENOUS ONCE AS NEEDED
Status: CANCELLED | OUTPATIENT
Start: 2024-05-20 | End: 2035-10-17

## 2024-05-20 RX ORDER — MUPIROCIN 20 MG/G
OINTMENT TOPICAL
Status: CANCELLED | OUTPATIENT
Start: 2024-05-20

## 2024-05-20 RX ORDER — OXYTOCIN/RINGER'S LACTATE 30/500 ML
334 PLASTIC BAG, INJECTION (ML) INTRAVENOUS ONCE
Status: CANCELLED | OUTPATIENT
Start: 2024-05-20 | End: 2024-05-20

## 2024-05-20 RX ORDER — OXYTOCIN/RINGER'S LACTATE 30/500 ML
95 PLASTIC BAG, INJECTION (ML) INTRAVENOUS ONCE
Status: CANCELLED | OUTPATIENT
Start: 2024-05-20 | End: 2024-05-20

## 2024-05-20 RX ORDER — SODIUM CITRATE AND CITRIC ACID MONOHYDRATE 334; 500 MG/5ML; MG/5ML
30 SOLUTION ORAL
Status: CANCELLED | OUTPATIENT
Start: 2024-05-20

## 2024-05-21 LAB
C TRACH RRNA SPEC QL NAA+PROBE: NEGATIVE
N GONORRHOEA RRNA SPEC QL NAA+PROBE: NEGATIVE
SPECIMEN SOURCE: NORMAL
STREP B PCR (OHS): NOT DETECTED
T VAGINALIS RRNA SPEC QL NAA+PROBE: NEGATIVE

## 2024-05-22 ENCOUNTER — HOSPITAL ENCOUNTER (OUTPATIENT)
Dept: PREADMISSION TESTING | Facility: HOSPITAL | Age: 29
Discharge: HOME | End: 2024-05-22
Payer: MEDICAID

## 2024-05-22 DIAGNOSIS — O99.343 ANXIETY DURING PREGNANCY IN THIRD TRIMESTER, ANTEPARTUM: ICD-10-CM

## 2024-05-22 DIAGNOSIS — O99.333 TOBACCO SMOKING AFFECTING PREGNANCY IN THIRD TRIMESTER: ICD-10-CM

## 2024-05-22 DIAGNOSIS — Z28.39 RUBELLA NON-IMMUNE STATUS, ANTEPARTUM: ICD-10-CM

## 2024-05-22 DIAGNOSIS — O09.899 RUBELLA NON-IMMUNE STATUS, ANTEPARTUM: ICD-10-CM

## 2024-05-22 DIAGNOSIS — Z3A.37 37 WEEKS GESTATION OF PREGNANCY: Primary | ICD-10-CM

## 2024-05-22 DIAGNOSIS — O34.219 UTERINE SCAR FROM PREVIOUS CESAREAN DELIVERY AFFECTING PREGNANCY: ICD-10-CM

## 2024-05-22 DIAGNOSIS — O99.513 ASTHMA AFFECTING PREGNANCY IN THIRD TRIMESTER: ICD-10-CM

## 2024-05-22 DIAGNOSIS — J45.909 ASTHMA AFFECTING PREGNANCY IN THIRD TRIMESTER: ICD-10-CM

## 2024-05-22 DIAGNOSIS — F41.9 ANXIETY DURING PREGNANCY IN THIRD TRIMESTER, ANTEPARTUM: ICD-10-CM

## 2024-05-22 LAB
BASOPHILS # BLD AUTO: 0.02 X10(3)/MCL (ref 0.01–0.08)
BASOPHILS NFR BLD AUTO: 0.2 % (ref 0.1–1.2)
EOSINOPHIL # BLD AUTO: 0.19 X10(3)/MCL (ref 0.04–0.36)
EOSINOPHIL NFR BLD AUTO: 2.3 % (ref 0.7–7)
ERYTHROCYTE [DISTWIDTH] IN BLOOD BY AUTOMATED COUNT: 15.7 % (ref 11–14.5)
HBV SURFACE AG SERPL QL IA: NEGATIVE
HBV SURFACE AG SERPL QL IA: NORMAL
HCT VFR BLD AUTO: 32.3 % (ref 36–48)
HGB BLD-MCNC: 10.8 G/DL (ref 11.8–16)
IMM GRANULOCYTES # BLD AUTO: 0.12 X10(3)/MCL (ref 0–0.03)
IMM GRANULOCYTES NFR BLD AUTO: 1.5 % (ref 0–0.5)
LYMPHOCYTES # BLD AUTO: 1.22 X10(3)/MCL (ref 1.16–3.74)
LYMPHOCYTES NFR BLD AUTO: 15 % (ref 20–55)
MCH RBC QN AUTO: 29.7 PG (ref 27–34)
MCHC RBC AUTO-ENTMCNC: 33.4 G/DL (ref 31–37)
MCV RBC AUTO: 88.7 FL (ref 79–99)
MONOCYTES # BLD AUTO: 0.52 X10(3)/MCL (ref 0.24–0.36)
MONOCYTES NFR BLD AUTO: 6.4 % (ref 4.7–12.5)
NEUTROPHILS # BLD AUTO: 6.05 X10(3)/MCL (ref 1.56–6.13)
NEUTROPHILS NFR BLD AUTO: 74.6 % (ref 37–73)
NRBC BLD AUTO-RTO: 0 %
PLATELET # BLD AUTO: 224 X10(3)/MCL (ref 140–371)
PMV BLD AUTO: 11.1 FL (ref 9.4–12.4)
RBC # BLD AUTO: 3.64 X10(6)/MCL (ref 4–5.1)
WBC # SPEC AUTO: 8.12 X10(3)/MCL (ref 4–11.5)

## 2024-05-22 PROCEDURE — 85025 COMPLETE CBC W/AUTO DIFF WBC: CPT | Performed by: OBSTETRICS & GYNECOLOGY

## 2024-05-22 PROCEDURE — 87389 HIV-1 AG W/HIV-1&-2 AB AG IA: CPT | Performed by: OBSTETRICS & GYNECOLOGY

## 2024-05-22 PROCEDURE — 87340 HEPATITIS B SURFACE AG IA: CPT | Performed by: OBSTETRICS & GYNECOLOGY

## 2024-05-22 NOTE — DISCHARGE INSTRUCTIONS
OB Pre-Operative Instructions    You are scheduled for an operative procedure on JUNE 6 AT 6:45 AM. PLEASE CHECK INTO THE OB DEPARTMENT AT 4:45 AM THE MORNING OF YOUR SURGERY.     Please follow the instructions listed below.     Should you develop a cold, temperature, sore throat, cough, or any other indication of illness prior to your scheduled procedure, please notify your physician.    DO NOT EAT OR DRINK ANYTHING AFTER 12:00 MIDNIGHT the night before your surgery or the morning of your procedure until after your procedure is completed and instructed by your nurse. This includes coffee, water, gum, and mints. You may brush your teeth, but do not swallow any water. Do not use any tobacco products the morning of the procedure (including dips, chews, smoking).    Please bathe and shampoo your hair before coming to the hospital.    Dress casually and wear loose, comfortable clothing. Please remove all make-up and nail polish. You will be given a patient gown for surgery. You must remove all undergarments, jewelry, and dentures unless instructed otherwise. Please leave all valuables at home or in the care of your family.    Any questions or further information should be directed to the out-patient nurse at [phone] between the hours of 8:00 a.m. and 3:00 p.m., Monday through Friday.    PATIENT PRE-OP INSTRUCTIONS:    Dear patient:    To help prevent an infection after your surgery, you will be given 2 towelettes to use the night before your surgery after you shower. Following these instructions carefully may help prevent an infection after your procedure.     Shower: You must shower the evening before your surgery!  A shower is best because it helps to wash away the dirt and germs.  A bath is acceptable if a shower is not available.  Be sure to rinse thoroughly with clean water.     1.  Do not shave the area of your body where surgery will be done.   2.  Wash with regular soap and water and rinse off. (If bathing  change the water.)  3.  Shampoo with your regular shampoo and rinse your hair.   4.  Use a clean towel to dry off your body.   5.  Do not use lotion, cream, or powder afterward.   6.  See instructions for towelettes and follow.   7.  Put on clean clothing and be sure to have clean sheets on your bed.   8.  Wear clean clothes the day of your procedure.     Scott 2% Chlorhexidine Gluconate* Clothes:  On the evening before your surgery after your shower follow these instructions:  Once dry after showering vigorously  wipe the area where your surgery will be performed with a Scott cloth for approximately 3 minutes. Dispose of the first cloth and allow area to air dry for 1 minute. Do not rinse or dry off area.  Use the second cloth to wipe off the rest of your body excluding your face and perineal/buttock areas.  Dispose of cloth and allow area to air dry. Do not rinse.     Condition of Skin:  Please tell your nurse if you are having any issues with your skin.  Any rash, scratch, or break in the skin including insect bites should be reported. Let your nurse know as soon as you arrive.     Prevent infection:  Maintaining good hygiene is very important in infection prevention.  Handwashing is especially helpful.  Cleaning your hands with an alcohol based hand  is also effective.  Please be sure to follow your doctor's instructions in the pre and post operative period!    Things to Bring to the Hospital:    To help make your stay as comfortable as possible you should bring the following items when you come to the hospital to deliver your baby:    1.  TOOTHBRUSH  2.  TOOTHPASTE  3.  SHAMPOO/CONDITIONER   4.  DEODORANT  5.  HAIRBRUSH OR COMB  6.  UNDERGARMENTS  7.  SOAP OR BODY WASH  8.  SANITARY NAPKINS/OVERNIGHTS IF YOU PREFER TO USE YOUR OWN PRODUCTS.   9.  NURSING PADS FOR YOUR BRA AND A BREAST PUMP IF YOU OWN ONE.     Other items needed for mom and baby include:    1.  Bras (2-3) should be packed and should be  ones that were worn during pregnancy or nursing bras for breastfeeding.   2.  Gowns, slippers, and a robe.  3.  One outfit for baby to go home in as well as a blanket.  The hospital will provide baby clothes, blankets, diapers, and wipes during hospital stay.    4.  Federally approved car seat is required for the infant to go home at discharge.      OB VISITATION POLICY    The OB Department is open to family for patient visitation.  Smoking is not allowed on the premises.  Visitors must check with staff before entering a patient's room if there is a sign posted on the door or in the more.   Visitors may be in the room with baby if they are free from infection or any signs and symptoms of illness.  All visitors must use hand  or soap and water before touching the baby.   No children under age 12 are allowed to sleep overnight or to be left unattended in room.   Our rooms only accommodate one overnight guest.  While in labor and delivery room, you will be allowed 2 support persons if you choose and one additional guest will be allowed at the discretion of the physician or nurse.   The 2-3 labor and delivery support people may be interchangeable at the discretion of the OB nurse in charge of your care.   Any other visitors will be directed to the waiting room or post-partum room until after delivery.   No one will be allowed to stay in the labor and delivery hallway.  After delivery, visitors will not be limited unless a procedure is in progress or your conditions warrants it.   Videos and photographs are not allowed to be taken during the delivery process. They may be taken after the baby is born and declared in good condition by nursing staff or physician.   If you will be having a delivery by , you will be allowed (1) support person in operating room unless the procedure is an emergency or under general anesthesia. No video or photography is allowed until after the procedure is complete.  The  support person present will be asked to leave the operating room with the baby after delivery or if any problems arise.   Family and friends should be made familiar with the visitor policy for our facility, so that we may insure that the safety and well-being of you and your baby during this very important time.

## 2024-05-22 NOTE — PRE-PROCEDURE INSTRUCTIONS
OB pre-admit instructions given for upcoming delivery. Patient states understanding of information. Encouraged any questions and concerns. Lab work completed. Patient preference includes bottle feeding and would like Dr. HALEY to take care of infant during hospital stay.

## 2024-05-23 ENCOUNTER — HOSPITAL ENCOUNTER (OUTPATIENT)
Facility: HOSPITAL | Age: 29
Discharge: HOME | End: 2024-05-24
Attending: OBSTETRICS & GYNECOLOGY | Admitting: OBSTETRICS & GYNECOLOGY
Payer: MEDICAID

## 2024-05-23 DIAGNOSIS — Z36.89 ENCOUNTER FOR TRIAGE IN PREGNANT PATIENT: ICD-10-CM

## 2024-05-23 LAB — HIV 1+2 AB+HIV1 P24 AG SERPL QL IA: NONREACTIVE

## 2024-05-23 PROCEDURE — 99211 OFF/OP EST MAY X REQ PHY/QHP: CPT

## 2024-05-23 RX ORDER — SODIUM CHLORIDE, SODIUM LACTATE, POTASSIUM CHLORIDE, CALCIUM CHLORIDE 600; 310; 30; 20 MG/100ML; MG/100ML; MG/100ML; MG/100ML
INJECTION, SOLUTION INTRAVENOUS CONTINUOUS
Status: DISCONTINUED | OUTPATIENT
Start: 2024-05-24 | End: 2024-05-24 | Stop reason: HOSPADM

## 2024-05-23 RX ORDER — ONDANSETRON 4 MG/1
8 TABLET, ORALLY DISINTEGRATING ORAL EVERY 8 HOURS PRN
Status: DISCONTINUED | OUTPATIENT
Start: 2024-05-24 | End: 2024-05-24 | Stop reason: HOSPADM

## 2024-05-23 RX ORDER — ACETAMINOPHEN 500 MG
500 TABLET ORAL EVERY 6 HOURS PRN
Status: DISCONTINUED | OUTPATIENT
Start: 2024-05-24 | End: 2024-05-24 | Stop reason: HOSPADM

## 2024-05-23 NOTE — PROGRESS NOTES
HPI  28 y.o.  at 37w6d here for OB check.  Reports positive fetal movement. trini LOF, CTX.       ROS  Review of Systems   Constitutional:  Negative for chills and fever.   Gastrointestinal:  Negative for abdominal pain, constipation and diarrhea.   Genitourinary:  Negative for flank pain, genital sores, pelvic pain, urgency, vaginal bleeding, vaginal discharge, vaginal pain, postcoital bleeding and vaginal odor.         OBJECTIVE  LMP 2023     Gen: No distress  Abdomen: Gravid, non-tender  Extremities: No edema  Cervix:   FHT:   FH:     ASSESSMENT  There are no diagnoses linked to this encounter.      PLAN  Reviewed standard labor unit and kick count precautions.  Discussed pre-eclampsia precautions.  Discussed COVID-19 risks, social distancing, and isolation if respiratory symptoms develop.     Cont Fe  Precautions      Plan for delivery 24      RTC

## 2024-05-24 VITALS — DIASTOLIC BLOOD PRESSURE: 72 MMHG | SYSTOLIC BLOOD PRESSURE: 104 MMHG | HEART RATE: 85 BPM

## 2024-05-24 LAB
BACTERIA #/AREA URNS AUTO: ABNORMAL /HPF
BILIRUB UR QL STRIP.AUTO: NEGATIVE
CLARITY UR: ABNORMAL
COLOR UR AUTO: YELLOW
GLUCOSE UR QL STRIP: NEGATIVE
HGB UR QL STRIP: NEGATIVE
KETONES UR QL STRIP: NEGATIVE
LEUKOCYTE ESTERASE UR QL STRIP: ABNORMAL
NITRITE UR QL STRIP: NEGATIVE
PH UR STRIP: 7.5 [PH]
PROT UR QL STRIP: NEGATIVE
RBC #/AREA URNS AUTO: ABNORMAL /HPF
SP GR UR STRIP.AUTO: 1.01 (ref 1–1.03)
SQUAMOUS #/AREA URNS AUTO: ABNORMAL /HPF
UROBILINOGEN UR STRIP-ACNC: 0.2
WBC #/AREA URNS AUTO: ABNORMAL /HPF

## 2024-05-24 PROCEDURE — 87086 URINE CULTURE/COLONY COUNT: CPT | Performed by: OBSTETRICS & GYNECOLOGY

## 2024-05-24 PROCEDURE — 81001 URINALYSIS AUTO W/SCOPE: CPT | Performed by: OBSTETRICS & GYNECOLOGY

## 2024-05-24 PROCEDURE — 59025 FETAL NON-STRESS TEST: CPT

## 2024-05-24 NOTE — NURSING
2310- Pt presented to  with c/o round ligament pain that started 1 hr ago. Pt placed in exam room and on EFM/TOCO. UA, vitals, SVE obtained. Cervix 1.5/thick/high. Pt confirms positive fetal movement. Pt denies any LOF or vaginal bleeding. POC discussed with pt, pt verbalizes understanding.     0230- SVE performed. No cervical change noted.     0240- Notified  of pt complaint, vital signs, fetal strip, contraction pattern, and SVE. Orders given to discharge pt home.     0245- Pt education given. Pt verbalizes understanding and has no further questions.     0256- Pt ambulated from  in stable condition with significant other.

## 2024-05-26 LAB — BACTERIA UR CULT: NORMAL

## 2024-05-27 ENCOUNTER — ROUTINE PRENATAL (OUTPATIENT)
Dept: OBSTETRICS AND GYNECOLOGY | Facility: CLINIC | Age: 29
End: 2024-05-27
Payer: MEDICAID

## 2024-05-27 VITALS — BODY MASS INDEX: 24.72 KG/M2 | WEIGHT: 144 LBS | SYSTOLIC BLOOD PRESSURE: 116 MMHG | DIASTOLIC BLOOD PRESSURE: 70 MMHG

## 2024-05-27 DIAGNOSIS — O34.219 UTERINE SCAR FROM PREVIOUS CESAREAN DELIVERY AFFECTING PREGNANCY: Primary | ICD-10-CM

## 2024-05-27 DIAGNOSIS — O99.333 TOBACCO SMOKING AFFECTING PREGNANCY IN THIRD TRIMESTER: ICD-10-CM

## 2024-05-27 DIAGNOSIS — Z28.39 RUBELLA NON-IMMUNE STATUS, ANTEPARTUM: ICD-10-CM

## 2024-05-27 DIAGNOSIS — O99.343 ANXIETY DURING PREGNANCY IN THIRD TRIMESTER, ANTEPARTUM: ICD-10-CM

## 2024-05-27 DIAGNOSIS — J45.909 ASTHMA AFFECTING PREGNANCY IN THIRD TRIMESTER: ICD-10-CM

## 2024-05-27 DIAGNOSIS — F41.9 ANXIETY DURING PREGNANCY IN THIRD TRIMESTER, ANTEPARTUM: ICD-10-CM

## 2024-05-27 DIAGNOSIS — O09.899 RUBELLA NON-IMMUNE STATUS, ANTEPARTUM: ICD-10-CM

## 2024-05-27 DIAGNOSIS — O99.013 ANEMIA DURING PREGNANCY IN THIRD TRIMESTER: ICD-10-CM

## 2024-05-27 DIAGNOSIS — Z3A.37 37 WEEKS GESTATION OF PREGNANCY: ICD-10-CM

## 2024-05-27 DIAGNOSIS — O99.513 ASTHMA AFFECTING PREGNANCY IN THIRD TRIMESTER: ICD-10-CM

## 2024-05-27 PROCEDURE — 99213 OFFICE O/P EST LOW 20 MIN: CPT | Mod: TH,,, | Performed by: OBSTETRICS & GYNECOLOGY

## 2024-05-27 NOTE — PROGRESS NOTES
HPI  28 y.o.  at 37w6d here for OB check.  Reports positive fetal movement. Pt presented to [facility] for contractions; resolved with IVF. Denies contractions today.       ROS  Review of Systems   Constitutional:  Negative for chills and fever.   Gastrointestinal:  Negative for abdominal pain, constipation and diarrhea.   Genitourinary:  Negative for flank pain, genital sores, pelvic pain, urgency, vaginal bleeding, vaginal discharge, vaginal pain, postcoital bleeding and vaginal odor.         OBJECTIVE  /70   Wt 65.3 kg (144 lb)   LMP 2023   BMI 24.72 kg/m²     Gen: No distress  Abdomen: Gravid, non-tender  Extremities: No edema  Cervix: 1.5/0/-3  FHT: 140  FH: 37cm      ASSESSMENT  1. Uterine scar from previous  delivery affecting pregnancy    2. 37 weeks gestation of pregnancy  - POCT Urinalysis, Dipstick, Automated, W/O Scope    3. Anxiety during pregnancy in third trimester, antepartum    4. Rubella non-immune status, antepartum    5. Asthma affecting pregnancy in third trimester    6. Tobacco smoking affecting pregnancy in third trimester    7. Anemia during pregnancy in third trimester        PLAN  Reviewed standard labor unit and kick count precautions.  Discussed pre-eclampsia precautions.  Discussed COVID-19 risks, social distancing, and isolation if respiratory symptoms develop.     Cont Fe  Precautions      Plan for delivery 24      RTC 1 week for ob check      This note was transcribed by Jennifer Florentino MA. There may be transcription errors as a result, however minimal. Effort has been made to ensure accuracy of transcription, but any obvious errors or omissions should be clarified with the author of the document.

## 2024-05-29 ENCOUNTER — HOSPITAL ENCOUNTER (INPATIENT)
Facility: HOSPITAL | Age: 29
LOS: 2 days | Discharge: HOME | End: 2024-05-31
Attending: OBSTETRICS & GYNECOLOGY | Admitting: OBSTETRICS & GYNECOLOGY
Payer: MEDICAID

## 2024-05-29 ENCOUNTER — TELEPHONE (OUTPATIENT)
Dept: OBSTETRICS AND GYNECOLOGY | Facility: CLINIC | Age: 29
End: 2024-05-29
Payer: MEDICAID

## 2024-05-29 ENCOUNTER — ANESTHESIA EVENT (OUTPATIENT)
Dept: SURGERY | Facility: HOSPITAL | Age: 29
End: 2024-05-29
Payer: MEDICAID

## 2024-05-29 ENCOUNTER — ANESTHESIA (OUTPATIENT)
Dept: SURGERY | Facility: HOSPITAL | Age: 29
End: 2024-05-29
Payer: MEDICAID

## 2024-05-29 DIAGNOSIS — O99.013 ANEMIA DURING PREGNANCY IN THIRD TRIMESTER: ICD-10-CM

## 2024-05-29 DIAGNOSIS — O99.513 ASTHMA AFFECTING PREGNANCY IN THIRD TRIMESTER: ICD-10-CM

## 2024-05-29 DIAGNOSIS — Z98.891 STATUS POST CESAREAN SECTION: Primary | ICD-10-CM

## 2024-05-29 DIAGNOSIS — J45.909 ASTHMA AFFECTING PREGNANCY IN THIRD TRIMESTER: ICD-10-CM

## 2024-05-29 DIAGNOSIS — Z28.39 RUBELLA NON-IMMUNE STATUS, ANTEPARTUM: ICD-10-CM

## 2024-05-29 DIAGNOSIS — N85.8 ALTERATION IN COMFORT ASSOCIATED WITH UTERINE CONTRACTIONS: ICD-10-CM

## 2024-05-29 DIAGNOSIS — F41.9 ANXIETY DURING PREGNANCY IN THIRD TRIMESTER, ANTEPARTUM: ICD-10-CM

## 2024-05-29 DIAGNOSIS — O99.343 ANXIETY DURING PREGNANCY IN THIRD TRIMESTER, ANTEPARTUM: ICD-10-CM

## 2024-05-29 DIAGNOSIS — O09.899 RUBELLA NON-IMMUNE STATUS, ANTEPARTUM: ICD-10-CM

## 2024-05-29 DIAGNOSIS — O34.219 UTERINE SCAR FROM PREVIOUS CESAREAN DELIVERY AFFECTING PREGNANCY: ICD-10-CM

## 2024-05-29 DIAGNOSIS — O99.333 TOBACCO SMOKING AFFECTING PREGNANCY IN THIRD TRIMESTER: ICD-10-CM

## 2024-05-29 LAB
ABO AND RH: NORMAL
AMPHET UR QL SCN: NEGATIVE
ANTIBODY SCREEN: NORMAL
BARBITURATE SCN PRESENT UR: NEGATIVE
BASOPHILS # BLD AUTO: 0.03 X10(3)/MCL (ref 0.01–0.08)
BASOPHILS NFR BLD AUTO: 0.2 % (ref 0.1–1.2)
BENZODIAZ UR QL SCN: NEGATIVE
CANNABINOIDS UR QL SCN: NEGATIVE
COCAINE UR QL SCN: NEGATIVE
EOSINOPHIL # BLD AUTO: 0.11 X10(3)/MCL (ref 0.04–0.36)
EOSINOPHIL NFR BLD AUTO: 0.7 % (ref 0.7–7)
ERYTHROCYTE [DISTWIDTH] IN BLOOD BY AUTOMATED COUNT: 15.3 % (ref 11–14.5)
HCT VFR BLD AUTO: 35.1 % (ref 36–48)
HGB BLD-MCNC: 11.6 G/DL (ref 11.8–16)
IMM GRANULOCYTES # BLD AUTO: 0.12 X10(3)/MCL (ref 0–0.03)
IMM GRANULOCYTES NFR BLD AUTO: 0.7 % (ref 0–0.5)
LYMPHOCYTES # BLD AUTO: 1.42 X10(3)/MCL (ref 1.16–3.74)
LYMPHOCYTES NFR BLD AUTO: 8.5 % (ref 20–55)
MCH RBC QN AUTO: 29.4 PG (ref 27–34)
MCHC RBC AUTO-ENTMCNC: 33 G/DL (ref 31–37)
MCV RBC AUTO: 88.9 FL (ref 79–99)
METHADONE UR QL SCN: NEGATIVE
MONOCYTES # BLD AUTO: 1.28 X10(3)/MCL (ref 0.24–0.36)
MONOCYTES NFR BLD AUTO: 7.7 % (ref 4.7–12.5)
NEUTROPHILS # BLD AUTO: 13.69 X10(3)/MCL (ref 1.56–6.13)
NEUTROPHILS NFR BLD AUTO: 82.2 % (ref 37–73)
NRBC BLD AUTO-RTO: 0 %
OPIATES UR QL SCN: NEGATIVE
PCP UR QL: NEGATIVE
PH UR: 7 [PH] (ref 5–8)
PLATELET # BLD AUTO: 230 X10(3)/MCL (ref 140–371)
PMV BLD AUTO: 10.2 FL (ref 9.4–12.4)
RBC # BLD AUTO: 3.95 X10(6)/MCL (ref 4–5.1)
SPECIMEN OUTDATE: NORMAL
WBC # SPEC AUTO: 16.65 X10(3)/MCL (ref 4–11.5)

## 2024-05-29 PROCEDURE — 63600175 PHARM REV CODE 636 W HCPCS: Performed by: OBSTETRICS & GYNECOLOGY

## 2024-05-29 PROCEDURE — 37000008 HC ANESTHESIA 1ST 15 MINUTES: Performed by: OBSTETRICS & GYNECOLOGY

## 2024-05-29 PROCEDURE — 36000708 HC OR TIME LEV III 1ST 15 MIN: Performed by: OBSTETRICS & GYNECOLOGY

## 2024-05-29 PROCEDURE — 59025 FETAL NON-STRESS TEST: CPT

## 2024-05-29 PROCEDURE — 80307 DRUG TEST PRSMV CHEM ANLYZR: CPT | Performed by: OBSTETRICS & GYNECOLOGY

## 2024-05-29 PROCEDURE — 36415 COLL VENOUS BLD VENIPUNCTURE: CPT | Performed by: OBSTETRICS & GYNECOLOGY

## 2024-05-29 PROCEDURE — 59514 CESAREAN DELIVERY ONLY: CPT | Mod: AT,,, | Performed by: OBSTETRICS & GYNECOLOGY

## 2024-05-29 PROCEDURE — 36000709 HC OR TIME LEV III EA ADD 15 MIN: Performed by: OBSTETRICS & GYNECOLOGY

## 2024-05-29 PROCEDURE — 86901 BLOOD TYPING SEROLOGIC RH(D): CPT | Performed by: OBSTETRICS & GYNECOLOGY

## 2024-05-29 PROCEDURE — D9220A PRA ANESTHESIA: Mod: QZ,,, | Performed by: NURSE ANESTHETIST, CERTIFIED REGISTERED

## 2024-05-29 PROCEDURE — 63600175 PHARM REV CODE 636 W HCPCS: Performed by: NURSE ANESTHETIST, CERTIFIED REGISTERED

## 2024-05-29 PROCEDURE — 11000001 HC ACUTE MED/SURG PRIVATE ROOM

## 2024-05-29 PROCEDURE — 37000009 HC ANESTHESIA EA ADD 15 MINS: Performed by: OBSTETRICS & GYNECOLOGY

## 2024-05-29 PROCEDURE — 85025 COMPLETE CBC W/AUTO DIFF WBC: CPT | Performed by: OBSTETRICS & GYNECOLOGY

## 2024-05-29 PROCEDURE — 71000033 HC RECOVERY, INTIAL HOUR: Performed by: OBSTETRICS & GYNECOLOGY

## 2024-05-29 PROCEDURE — 99211 OFF/OP EST MAY X REQ PHY/QHP: CPT | Mod: 25

## 2024-05-29 RX ORDER — OXYTOCIN 10 [USP'U]/ML
10 INJECTION, SOLUTION INTRAMUSCULAR; INTRAVENOUS ONCE AS NEEDED
Status: DISCONTINUED | OUTPATIENT
Start: 2024-05-29 | End: 2024-05-31 | Stop reason: HOSPADM

## 2024-05-29 RX ORDER — MISOPROSTOL 100 UG/1
800 TABLET ORAL ONCE AS NEEDED
Status: DISCONTINUED | OUTPATIENT
Start: 2024-05-29 | End: 2024-05-31 | Stop reason: HOSPADM

## 2024-05-29 RX ORDER — AMOXICILLIN 250 MG
1 CAPSULE ORAL NIGHTLY PRN
Status: DISCONTINUED | OUTPATIENT
Start: 2024-05-29 | End: 2024-05-31 | Stop reason: HOSPADM

## 2024-05-29 RX ORDER — SODIUM CHLORIDE, SODIUM LACTATE, POTASSIUM CHLORIDE, CALCIUM CHLORIDE 600; 310; 30; 20 MG/100ML; MG/100ML; MG/100ML; MG/100ML
INJECTION, SOLUTION INTRAVENOUS CONTINUOUS
Status: DISCONTINUED | OUTPATIENT
Start: 2024-05-29 | End: 2024-05-29

## 2024-05-29 RX ORDER — PRENATAL WITH FERROUS FUM AND FOLIC ACID 3080; 920; 120; 400; 22; 1.84; 3; 20; 10; 1; 12; 200; 27; 25; 2 [IU]/1; [IU]/1; MG/1; [IU]/1; MG/1; MG/1; MG/1; MG/1; MG/1; MG/1; UG/1; MG/1; MG/1; MG/1; MG/1
1 TABLET ORAL DAILY
Status: DISCONTINUED | OUTPATIENT
Start: 2024-05-30 | End: 2024-05-31 | Stop reason: HOSPADM

## 2024-05-29 RX ORDER — DIPHENHYDRAMINE HCL 25 MG
25 CAPSULE ORAL EVERY 4 HOURS PRN
Status: DISCONTINUED | OUTPATIENT
Start: 2024-05-29 | End: 2024-05-31 | Stop reason: HOSPADM

## 2024-05-29 RX ORDER — KETOROLAC TROMETHAMINE 30 MG/ML
30 INJECTION, SOLUTION INTRAMUSCULAR; INTRAVENOUS EVERY 8 HOURS
Status: COMPLETED | OUTPATIENT
Start: 2024-05-29 | End: 2024-05-30

## 2024-05-29 RX ORDER — OXYCODONE AND ACETAMINOPHEN 10; 325 MG/1; MG/1
1 TABLET ORAL EVERY 4 HOURS PRN
Status: DISCONTINUED | OUTPATIENT
Start: 2024-05-29 | End: 2024-05-31 | Stop reason: HOSPADM

## 2024-05-29 RX ORDER — CARBOPROST TROMETHAMINE 250 UG/ML
250 INJECTION, SOLUTION INTRAMUSCULAR
Status: DISCONTINUED | OUTPATIENT
Start: 2024-05-29 | End: 2024-05-31 | Stop reason: HOSPADM

## 2024-05-29 RX ORDER — OXYTOCIN/RINGER'S LACTATE 30/500 ML
334 PLASTIC BAG, INJECTION (ML) INTRAVENOUS ONCE
Status: COMPLETED | OUTPATIENT
Start: 2024-05-29 | End: 2024-05-29

## 2024-05-29 RX ORDER — SIMETHICONE 80 MG
1 TABLET,CHEWABLE ORAL EVERY 6 HOURS PRN
Status: DISCONTINUED | OUTPATIENT
Start: 2024-05-29 | End: 2024-05-31 | Stop reason: HOSPADM

## 2024-05-29 RX ORDER — OXYTOCIN/RINGER'S LACTATE 30/500 ML
334 PLASTIC BAG, INJECTION (ML) INTRAVENOUS ONCE AS NEEDED
Status: COMPLETED | OUTPATIENT
Start: 2024-05-29 | End: 2024-05-29

## 2024-05-29 RX ORDER — BISACODYL 10 MG/1
10 SUPPOSITORY RECTAL ONCE AS NEEDED
Status: DISCONTINUED | OUTPATIENT
Start: 2024-05-29 | End: 2024-05-31 | Stop reason: HOSPADM

## 2024-05-29 RX ORDER — CEFAZOLIN SODIUM 2 G/50ML
2 SOLUTION INTRAVENOUS ONCE AS NEEDED
Status: COMPLETED | OUTPATIENT
Start: 2024-05-29 | End: 2024-05-29

## 2024-05-29 RX ORDER — BUPIVACAINE HYDROCHLORIDE 7.5 MG/ML
INJECTION, SOLUTION EPIDURAL; RETROBULBAR
Status: COMPLETED | OUTPATIENT
Start: 2024-05-29 | End: 2024-05-29

## 2024-05-29 RX ORDER — HYDROCORTISONE 25 MG/G
CREAM TOPICAL 3 TIMES DAILY PRN
Status: DISCONTINUED | OUTPATIENT
Start: 2024-05-29 | End: 2024-05-31 | Stop reason: HOSPADM

## 2024-05-29 RX ORDER — DIPHENOXYLATE HYDROCHLORIDE AND ATROPINE SULFATE 2.5; .025 MG/1; MG/1
2 TABLET ORAL EVERY 6 HOURS PRN
Status: DISCONTINUED | OUTPATIENT
Start: 2024-05-29 | End: 2024-05-31 | Stop reason: HOSPADM

## 2024-05-29 RX ORDER — SODIUM CHLORIDE 0.9 % (FLUSH) 0.9 %
10 SYRINGE (ML) INJECTION
Status: DISCONTINUED | OUTPATIENT
Start: 2024-05-29 | End: 2024-05-31 | Stop reason: HOSPADM

## 2024-05-29 RX ORDER — ADHESIVE BANDAGE
30 BANDAGE TOPICAL 2 TIMES DAILY PRN
Status: DISCONTINUED | OUTPATIENT
Start: 2024-05-30 | End: 2024-05-31 | Stop reason: HOSPADM

## 2024-05-29 RX ORDER — OXYTOCIN/RINGER'S LACTATE 30/500 ML
95 PLASTIC BAG, INJECTION (ML) INTRAVENOUS ONCE
Status: DISCONTINUED | OUTPATIENT
Start: 2024-05-29 | End: 2024-05-31 | Stop reason: HOSPADM

## 2024-05-29 RX ORDER — MISOPROSTOL 100 UG/1
1000 TABLET ORAL
Status: DISCONTINUED | OUTPATIENT
Start: 2024-05-29 | End: 2024-05-29

## 2024-05-29 RX ORDER — CLINDAMYCIN PHOSPHATE 900 MG/50ML
900 INJECTION, SOLUTION INTRAVENOUS ONCE AS NEEDED
Status: DISCONTINUED | OUTPATIENT
Start: 2024-05-29 | End: 2024-05-29

## 2024-05-29 RX ORDER — FAMOTIDINE 10 MG/ML
20 INJECTION INTRAVENOUS
Status: DISCONTINUED | OUTPATIENT
Start: 2024-05-29 | End: 2024-05-29

## 2024-05-29 RX ORDER — HYDROCODONE BITARTRATE AND ACETAMINOPHEN 7.5; 325 MG/1; MG/1
1 TABLET ORAL EVERY 4 HOURS PRN
Status: DISCONTINUED | OUTPATIENT
Start: 2024-05-29 | End: 2024-05-31 | Stop reason: HOSPADM

## 2024-05-29 RX ORDER — MIDAZOLAM HYDROCHLORIDE 1 MG/ML
INJECTION INTRAMUSCULAR; INTRAVENOUS
Status: DISCONTINUED | OUTPATIENT
Start: 2024-05-29 | End: 2024-05-29

## 2024-05-29 RX ORDER — ACETAMINOPHEN 500 MG
500 TABLET ORAL EVERY 6 HOURS PRN
Status: DISCONTINUED | OUTPATIENT
Start: 2024-05-29 | End: 2024-05-29

## 2024-05-29 RX ORDER — ONDANSETRON 4 MG/1
8 TABLET, ORALLY DISINTEGRATING ORAL EVERY 8 HOURS PRN
Status: DISCONTINUED | OUTPATIENT
Start: 2024-05-29 | End: 2024-05-29

## 2024-05-29 RX ORDER — CARBOPROST TROMETHAMINE 250 UG/ML
250 INJECTION, SOLUTION INTRAMUSCULAR
Status: DISCONTINUED | OUTPATIENT
Start: 2024-05-29 | End: 2024-05-29

## 2024-05-29 RX ORDER — MUPIROCIN 20 MG/G
OINTMENT TOPICAL 2 TIMES DAILY
Status: DISCONTINUED | OUTPATIENT
Start: 2024-05-29 | End: 2024-05-30

## 2024-05-29 RX ORDER — METHYLERGONOVINE MALEATE 0.2 MG/ML
200 INJECTION INTRAVENOUS ONCE AS NEEDED
Status: DISCONTINUED | OUTPATIENT
Start: 2024-05-29 | End: 2024-05-31 | Stop reason: HOSPADM

## 2024-05-29 RX ORDER — SODIUM CITRATE AND CITRIC ACID MONOHYDRATE 334; 500 MG/5ML; MG/5ML
30 SOLUTION ORAL
Status: DISCONTINUED | OUTPATIENT
Start: 2024-05-29 | End: 2024-05-29

## 2024-05-29 RX ORDER — HYDROMORPHONE HYDROCHLORIDE 1 MG/ML
1 INJECTION, SOLUTION INTRAMUSCULAR; INTRAVENOUS; SUBCUTANEOUS EVERY 4 HOURS PRN
Status: DISCONTINUED | OUTPATIENT
Start: 2024-05-29 | End: 2024-05-31 | Stop reason: HOSPADM

## 2024-05-29 RX ORDER — DOCUSATE SODIUM 100 MG/1
200 CAPSULE, LIQUID FILLED ORAL 2 TIMES DAILY
Status: DISCONTINUED | OUTPATIENT
Start: 2024-05-29 | End: 2024-05-31 | Stop reason: HOSPADM

## 2024-05-29 RX ORDER — ACETAMINOPHEN 10 MG/ML
INJECTION, SOLUTION INTRAVENOUS
Status: DISCONTINUED | OUTPATIENT
Start: 2024-05-29 | End: 2024-05-29

## 2024-05-29 RX ORDER — IBUPROFEN 600 MG/1
600 TABLET ORAL EVERY 6 HOURS PRN
Status: DISCONTINUED | OUTPATIENT
Start: 2024-05-30 | End: 2024-05-30

## 2024-05-29 RX ORDER — MUPIROCIN 20 MG/G
OINTMENT TOPICAL
Status: DISCONTINUED | OUTPATIENT
Start: 2024-05-29 | End: 2024-05-29

## 2024-05-29 RX ORDER — OXYTOCIN/RINGER'S LACTATE 30/500 ML
95 PLASTIC BAG, INJECTION (ML) INTRAVENOUS ONCE AS NEEDED
Status: DISCONTINUED | OUTPATIENT
Start: 2024-05-29 | End: 2024-05-31 | Stop reason: HOSPADM

## 2024-05-29 RX ORDER — TRANEXAMIC ACID 10 MG/ML
1000 INJECTION, SOLUTION INTRAVENOUS ONCE
Status: DISCONTINUED | OUTPATIENT
Start: 2024-05-29 | End: 2024-05-31 | Stop reason: HOSPADM

## 2024-05-29 RX ORDER — FENTANYL CITRATE 50 UG/ML
INJECTION, SOLUTION INTRAMUSCULAR; INTRAVENOUS
Status: DISCONTINUED | OUTPATIENT
Start: 2024-05-29 | End: 2024-05-29

## 2024-05-29 RX ORDER — OXYTOCIN/RINGER'S LACTATE 30/500 ML
95 PLASTIC BAG, INJECTION (ML) INTRAVENOUS ONCE
Status: DISCONTINUED | OUTPATIENT
Start: 2024-05-29 | End: 2024-05-29

## 2024-05-29 RX ORDER — METHYLERGONOVINE MALEATE 0.2 MG/ML
200 INJECTION INTRAVENOUS
Status: DISCONTINUED | OUTPATIENT
Start: 2024-05-29 | End: 2024-05-29

## 2024-05-29 RX ORDER — ONDANSETRON 4 MG/1
8 TABLET, ORALLY DISINTEGRATING ORAL EVERY 8 HOURS PRN
Status: DISCONTINUED | OUTPATIENT
Start: 2024-05-29 | End: 2024-05-31 | Stop reason: HOSPADM

## 2024-05-29 RX ADMIN — OXYTOCIN 334 MILLI-UNITS/MIN: 10 INJECTION INTRAVENOUS at 11:05

## 2024-05-29 RX ADMIN — KETOROLAC TROMETHAMINE 30 MG: 30 INJECTION, SOLUTION INTRAMUSCULAR at 10:05

## 2024-05-29 RX ADMIN — FENTANYL CITRATE 100 MCG: 50 INJECTION, SOLUTION INTRAMUSCULAR; INTRAVENOUS at 09:05

## 2024-05-29 RX ADMIN — MIDAZOLAM HYDROCHLORIDE 2 MG: 1 INJECTION, SOLUTION INTRAMUSCULAR; INTRAVENOUS at 09:05

## 2024-05-29 RX ADMIN — FENTANYL CITRATE 50 MCG: 50 INJECTION, SOLUTION INTRAMUSCULAR; INTRAVENOUS at 09:05

## 2024-05-29 RX ADMIN — SODIUM CHLORIDE, POTASSIUM CHLORIDE, SODIUM LACTATE AND CALCIUM CHLORIDE 1000 ML: 600; 310; 30; 20 INJECTION, SOLUTION INTRAVENOUS at 06:05

## 2024-05-29 RX ADMIN — Medication 999 ML/HR: at 09:05

## 2024-05-29 RX ADMIN — SODIUM CHLORIDE, POTASSIUM CHLORIDE, SODIUM LACTATE AND CALCIUM CHLORIDE: 600; 310; 30; 20 INJECTION, SOLUTION INTRAVENOUS at 08:05

## 2024-05-29 RX ADMIN — HYDROMORPHONE HYDROCHLORIDE 1 MG: 1 INJECTION, SOLUTION INTRAMUSCULAR; INTRAVENOUS; SUBCUTANEOUS at 10:05

## 2024-05-29 RX ADMIN — ACETAMINOPHEN 1000 MG: 1000 INJECTION, SOLUTION INTRAVENOUS at 09:05

## 2024-05-29 RX ADMIN — CEFAZOLIN SODIUM 2 G: 2 SOLUTION INTRAVENOUS at 08:05

## 2024-05-29 RX ADMIN — BUPIVACAINE HYDROCHLORIDE 1.8 ML: 7.5 INJECTION, SOLUTION EPIDURAL; RETROBULBAR at 08:05

## 2024-05-29 NOTE — NURSING
PT PRESENTED TO UNIT WITH C/O CONTRACTIONS THAT STARTED AROUND 1430. PT ESAU EVERY 2-3 MIN, STRIP REACTIVE. SVE 2CM ON ARRIVAL, 2.5 CM AFTER ONE HOUR. UPDATED DR NUGENT, ORDERS FOR PT TO BE ADMITTED AND KEPT FOR SECTION. IV STARTED AND BOLUS LR GIVEN.    Azelaic Acid Counseling: Patient counseled that medicine may cause skin irritation and to avoid applying near the eyes.  In the event of skin irritation, the patient was advised to reduce the amount of the drug applied or use it less frequently.   The patient verbalized understanding of the proper use and possible adverse effects of azelaic acid.  All of the patient's questions and concerns were addressed.

## 2024-05-29 NOTE — TELEPHONE ENCOUNTER
"Pt placed call to office, 38w1d, c/o "severe pelvic pain/pressure with discharge since this morning. I am having to wobble to the bathroom because of the pain.". per Dr Augustine, instructed pt to report to [facility] now for evaluation. Pt voiced understanding.   "

## 2024-05-30 LAB
BASOPHILS # BLD AUTO: 0.02 X10(3)/MCL (ref 0.01–0.08)
BASOPHILS NFR BLD AUTO: 0.1 % (ref 0.1–1.2)
EOSINOPHIL # BLD AUTO: 0.02 X10(3)/MCL (ref 0.04–0.36)
EOSINOPHIL NFR BLD AUTO: 0.1 % (ref 0.7–7)
ERYTHROCYTE [DISTWIDTH] IN BLOOD BY AUTOMATED COUNT: 14.7 % (ref 11–14.5)
HCT VFR BLD AUTO: 31.6 % (ref 36–48)
HGB BLD-MCNC: 10.7 G/DL (ref 11.8–16)
IMM GRANULOCYTES # BLD AUTO: 0.11 X10(3)/MCL (ref 0–0.03)
IMM GRANULOCYTES NFR BLD AUTO: 0.6 % (ref 0–0.5)
LYMPHOCYTES # BLD AUTO: 1.13 X10(3)/MCL (ref 1.16–3.74)
LYMPHOCYTES NFR BLD AUTO: 6.2 % (ref 20–55)
MCH RBC QN AUTO: 29.7 PG (ref 27–34)
MCHC RBC AUTO-ENTMCNC: 33.9 G/DL (ref 31–37)
MCV RBC AUTO: 87.8 FL (ref 79–99)
MONOCYTES # BLD AUTO: 1.32 X10(3)/MCL (ref 0.24–0.36)
MONOCYTES NFR BLD AUTO: 7.3 % (ref 4.7–12.5)
NEUTROPHILS # BLD AUTO: 15.59 X10(3)/MCL (ref 1.56–6.13)
NEUTROPHILS NFR BLD AUTO: 85.7 % (ref 37–73)
NRBC BLD AUTO-RTO: 0 %
PLATELET # BLD AUTO: 211 X10(3)/MCL (ref 140–371)
PMV BLD AUTO: 10.2 FL (ref 9.4–12.4)
RBC # BLD AUTO: 3.6 X10(6)/MCL (ref 4–5.1)
WBC # SPEC AUTO: 18.19 X10(3)/MCL (ref 4–11.5)

## 2024-05-30 PROCEDURE — 85025 COMPLETE CBC W/AUTO DIFF WBC: CPT | Performed by: OBSTETRICS & GYNECOLOGY

## 2024-05-30 PROCEDURE — 99231 SBSQ HOSP IP/OBS SF/LOW 25: CPT | Mod: ,,,

## 2024-05-30 PROCEDURE — 11000001 HC ACUTE MED/SURG PRIVATE ROOM

## 2024-05-30 PROCEDURE — 63600175 PHARM REV CODE 636 W HCPCS: Performed by: OBSTETRICS & GYNECOLOGY

## 2024-05-30 PROCEDURE — 36415 COLL VENOUS BLD VENIPUNCTURE: CPT | Performed by: OBSTETRICS & GYNECOLOGY

## 2024-05-30 PROCEDURE — 25000003 PHARM REV CODE 250: Performed by: OBSTETRICS & GYNECOLOGY

## 2024-05-30 RX ORDER — IBUPROFEN 400 MG/1
800 TABLET ORAL EVERY 8 HOURS
Status: DISCONTINUED | OUTPATIENT
Start: 2024-05-30 | End: 2024-05-31 | Stop reason: HOSPADM

## 2024-05-30 RX ADMIN — PRENATAL VITAMINS-IRON FUMARATE 27 MG IRON-FOLIC ACID 0.8 MG TABLET 1 TABLET: at 08:05

## 2024-05-30 RX ADMIN — IBUPROFEN 800 MG: 400 TABLET, FILM COATED ORAL at 09:05

## 2024-05-30 RX ADMIN — KETOROLAC TROMETHAMINE 30 MG: 30 INJECTION, SOLUTION INTRAMUSCULAR at 05:05

## 2024-05-30 RX ADMIN — ONDANSETRON 8 MG: 4 TABLET, ORALLY DISINTEGRATING ORAL at 12:05

## 2024-05-30 RX ADMIN — DOCUSATE SODIUM 200 MG: 100 CAPSULE, LIQUID FILLED ORAL at 08:05

## 2024-05-30 RX ADMIN — OXYCODONE HYDROCHLORIDE AND ACETAMINOPHEN 1 TABLET: 10; 325 TABLET ORAL at 12:05

## 2024-05-30 RX ADMIN — OXYCODONE HYDROCHLORIDE AND ACETAMINOPHEN 1 TABLET: 10; 325 TABLET ORAL at 06:05

## 2024-05-30 RX ADMIN — DOCUSATE SODIUM 200 MG: 100 CAPSULE, LIQUID FILLED ORAL at 09:05

## 2024-05-30 RX ADMIN — KETOROLAC TROMETHAMINE 30 MG: 30 INJECTION, SOLUTION INTRAMUSCULAR at 01:05

## 2024-05-30 RX ADMIN — OXYCODONE HYDROCHLORIDE AND ACETAMINOPHEN 1 TABLET: 10; 325 TABLET ORAL at 08:05

## 2024-05-30 NOTE — INTERVAL H&P NOTE
The patient has been examined and the H&P has been reviewed:  28 y.o. female  at 38w1d presented in labor with regular, painful uterine contractions.           Active Hospital Problems    Diagnosis  POA    *Uterine scar from previous  delivery affecting pregnancy [O34.219]  Yes    Alteration in comfort associated with uterine contractions [N85.8]  Yes    Tobacco smoking affecting pregnancy in third trimester [O99.333]  Yes    Rubella non-immune status, antepartum [O09.899, Z28.39]  Not Applicable    Asthma affecting pregnancy in third trimester [O99.513, J45.909]  Yes    Anxiety during pregnancy in third trimester, antepartum [O99.343, F41.9]  Yes    Anemia during pregnancy in third trimester [O99.013]  Yes      Resolved Hospital Problems   No resolved problems to display.   To OR for repeat

## 2024-05-30 NOTE — ANESTHESIA POSTPROCEDURE EVALUATION
Anesthesia Post Evaluation    Patient: Jamia Smith    Procedure(s) Performed: Procedure(s) (LRB):   SECTION (N/A)    Final Anesthesia Type: spinal      Patient location during evaluation: PACU  Patient participation: Yes- Able to Participate  Level of consciousness: awake and alert, awake and oriented  Post-procedure vital signs: reviewed and stable  Pain management: adequate  Airway patency: patent  MILY mitigation strategies: Preoperative initiation of continuous positive airway pressure (CPAP) or non-invasive positive pressure ventilation (NIPPV), Multimodal analgesia and Use of major conduction anesthesia (spinal/epidural) or peripheral nerve block  PONV status at discharge: No PONV  Anesthetic complications: no      Cardiovascular status: blood pressure returned to baseline  Respiratory status: unassisted, room air and spontaneous ventilation  Hydration status: euvolemic  Follow-up not needed.              Vitals Value Taken Time   /92 24 2153   Temp 98.2 24 2155   Pulse 113 24 2154   Resp 17 24 2155   SpO2 98 % 24   Vitals shown include unfiled device data.      No case tracking events are documented in the log.      Pain/Erick Score: No data recorded

## 2024-05-30 NOTE — ANESTHESIA PREPROCEDURE EVALUATION
05/29/2024  Jamia Smith is a 28 y.o., female.      Pre-op Assessment    I have reviewed the Patient Summary Reports.     I have reviewed the Nursing Notes. I have reviewed the NPO Status.   I have reviewed the Medications.     Review of Systems  Anesthesia Hx:  No problems with previous Anesthesia             Denies Family Hx of Anesthesia complications.    Denies Personal Hx of Anesthesia complications.                    Social:  Smoker       Hematology/Oncology:  Hematology Normal   Oncology Normal                                   EENT/Dental:  EENT/Dental Normal           Cardiovascular:  Cardiovascular Normal Exercise tolerance: good                                           Pulmonary:    Asthma asymptomatic                   Renal/:  Renal/ Normal                 Hepatic/GI:     GERD             Musculoskeletal:  Musculoskeletal Normal                Neurological:  Neurology Normal                                      Endocrine:  Endocrine Normal            Dermatological:  Skin Normal    Psych:  Psychiatric History anxiety                 Physical Exam  General: Well nourished, Cooperative, Alert and Oriented    Airway:  Mallampati: II / II  Mouth Opening: Normal  TM Distance: Normal  Tongue: Normal  Neck ROM: Normal ROM    Dental:  Intact        Anesthesia Plan  Type of Anesthesia, risks & benefits discussed:    Anesthesia Type: Spinal  Intra-op Monitoring Plan: Standard ASA Monitors  Post Op Pain Control Plan: multimodal analgesia  Induction:  IV  Airway Plan: Direct  Informed Consent: Informed consent signed with the Patient and all parties understand the risks and agree with anesthesia plan.  All questions answered. Patient consented to blood products? Yes  ASA Score: 2 Emergent  Day of Surgery Review of History & Physical: H&P Update referred to the surgeon/provider.I have interviewed  and examined the patient. I have reviewed the patient's H&P dated: There are no significant changes.     Ready For Surgery From Anesthesia Perspective.     .

## 2024-05-30 NOTE — OP NOTE
DATE OF PROCEDURE: 2024    PRE-OP DIAGNOSIS:  38w1d     * Normal labor [O80, Z37.9]     * Uterine scar from previous  delivery affecting pregnancy [O34.219]     * Maternal asthma complicating pregnancy [O99.519, J45.909]     * Maternal tobacco use in third trimester [O99.333]     * Maternal anemia in pregnancy, antepartum, third trimester [O99.013]     * Anxiety disorder affecting pregnancy, antepartum [O99.340, F41.9]    POST-OP DIAGNOSIS:  38w1d  Post-Op Diagnosis Codes:     * Normal labor [O80, Z37.9]     * Uterine scar from previous  delivery affecting pregnancy [O34.219]     * Maternal asthma complicating pregnancy [O99.519, J45.909]     * Maternal tobacco use in third trimester [O99.333]     * Maternal anemia in pregnancy, antepartum, third trimester [O99.013]     * Anxiety disorder affecting pregnancy, antepartum [O99.340, F41.9]    PROCEDURE:   Procedure(s) (LRB):   SECTION (N/A)    SURGEON: Davide Hood MD    ANESTHESIA: Spinal    ESTIMATED BLOOD LOSS:   500 cc    After consents were reviewed patient was taken to the operating room where a time-out was held.  She was placed on the OR table in the dorsal supine position.  She was prepped and draped in standard sterile fashion.  A Pfannenstiel skin incision was made and carried down to the underlying fascia.  The fascia was incised and rectus muscles were dissected superiorly and inferiorly.  Muscles were  in the midline the peritoneum was tented up and entered sharply with Collins scissors.  The vesicouterine peritoneal fold was incised with Metzenbaum scissors and a bladder flap was created.  The bladder was deflected with bladder blade.  A low-transverse hysterotomy was made the fetal head was grasped and brought through the hysterotomy.  The remainder of the baby was delivered and placed on the mother's abdomen the cord was milked clamped and cut and baby was handed off to awaiting nurses for initial resuscitation.   Apgar scores were 8 and 9 at 1 and 5 minutes, respectively.  A section of cord was obtained for cord gases.  The placenta was delivered spontaneously intact and handed off.  The uterus was exteriorized and cleaned with a dry lap.  The hysterotomy was then closed with a running locking layer of 0 Monocryl and imbricated with a 2nd layer of 0 Monocryl.  The pelvis was irrigated and freed of all clots and debris.  After ensuring hemostasis muscles and peritoneum were closed with interrupted sutures of 0 chromic gut.  Fascia was closed with a PDS suture. Subcutaneous tissues were irrigated, and Yg's fascia was closed with 3-0 plain gut.  Skin was closed with staples. Bandage was applied.  Patient was awakened and taken to the recovery room in stable condition having tolerated the procedure very well.  Sponge, lap, needle count correct at end of procedure.    I agree with anesthesia's plan of care.

## 2024-05-30 NOTE — ANESTHESIA PROCEDURE NOTES
Spinal    Diagnosis: Repeat C/S  Patient location during procedure: OR  Start time: 5/29/2024 8:45 PM  Timeout: 5/29/2024 8:39 PM  End time: 5/29/2024 8:52 PM    Staffing  Authorizing Provider: Demetrius Pitts CRNA  Performing Provider: Demetrius Pitts CRNA    Staffing  Performed by: Demetrius Pitts CRNA  Authorized by: Demetrius Pitts CRNA    Preanesthetic Checklist  Completed: patient identified, IV checked, site marked, risks and benefits discussed, surgical consent, monitors and equipment checked, pre-op evaluation and timeout performed  Spinal Block  Patient position: sitting  Prep: ChloraPrep  Patient monitoring: cardiac monitor, continuous pulse ox, continuous capnometry and frequent blood pressure checks  Approach: midline  Location: L4-5  Injection technique: single shot  CSF Fluid: clear free-flowing CSF  Needle  Needle type: pencil-tip   Needle gauge: 25 G  Needle length: 3.5 in  Additional Documentation: incremental injection  Needle localization: anatomical landmarks  Assessment  Sensory level: T4   Dermatomal levels determined by alcohol wipe  Medications:    Medications: bupivacaine (pf) (MARCAINE) injection 0.75% - Intraspinal   1.8 mL - 5/29/2024 8:54:00 PM

## 2024-05-30 NOTE — PROGRESS NOTES
PostPartum Progress Note        Subjective:      Post-Partum Day #1 after uncomplicated repeat CS  delivery.    Patient is without complaints. Lochia decreasing. Bottle feeding. Pain is well controlled. Patient is ambulating and urinating without complications. Tolerating full regular diet. Pain well controlled. Overall mother and baby are doing well.     Objective:      Temp:  [97.8 °F (36.6 °C)-98.7 °F (37.1 °C)] 98.6 °F (37 °C)  Pulse:  [] 94  Resp:  [16-20] 18  SpO2:  [97 %-100 %] 100 %  BP: (107-154)/(60-99) 132/84    Intake/Output Summary (Last 24 hours) at 2024 1635  Last data filed at 2024 0504  Gross per 24 hour   Intake 483 ml   Output 2690 ml   Net -2207 ml     There is no height or weight on file to calculate BMI.    General: no acute distress  Abdomen: soft, non-tender, non-distended; Fundus firm and at the umbilicus. Incision: CDI, appropriate TTP. Closed with staples, medipore dressing in place    Extremities: non-tender, symmetric, trace edema    Lab Results   Component Value Date/Time    GROUPTRH O POS 2022 12:00 AM    ABORH O POS 2024 06:16 PM    ABSCREEN NEG 2024 06:16 PM     Recent Results (from the past 336 hour(s))   CBC with Differential    Collection Time: 24  3:02 AM   Result Value Ref Range    WBC 18.19 (H) 4.00 - 11.50 x10(3)/mcL    Hgb 10.7 (L) 11.8 - 16.0 g/dL    Hct 31.6 (L) 36.0 - 48.0 %    Platelet 211 140 - 371 x10(3)/mcL   CBC with Differential    Collection Time: 24  6:16 PM   Result Value Ref Range    WBC 16.65 (H) 4.00 - 11.50 x10(3)/mcL    Hgb 11.6 (L) 11.8 - 16.0 g/dL    Hct 35.1 (L) 36.0 - 48.0 %    Platelet 230 140 - 371 x10(3)/mcL   CBC with Differential    Collection Time: 24  1:27 PM   Result Value Ref Range    WBC 8.12 4.00 - 11.50 x10(3)/mcL    Hgb 10.8 (L) 11.8 - 16.0 g/dL    Hct 32.3 (L) 36.0 - 48.0 %    Platelet 224 140 - 371 x10(3)/mcL          Assessment:     28 y.o.  S/P CS Post-Partum Day #1  - Doing  Well      Plan:     1. Continue routine postpartum care ambulation encouraged   2. Plan for D/C in AM  3. Breast Feeding encouraged, lactation consult  4. Rhogam per protocol

## 2024-05-30 NOTE — BRIEF OP NOTE
[facility]    SUMMARY     Surgery Date: 2024     Surgeons and Role:     * Davide Hood MD - Primary    Assisting Surgeon: None    Pre-op Diagnosis:       * Normal labor [O80, Z37.9]     * Uterine scar from previous  delivery affecting pregnancy [O34.219]     * Maternal asthma complicating pregnancy [O99.519, J45.909]     * Maternal tobacco use in third trimester [O99.333]     * Maternal anemia in pregnancy, antepartum, third trimester [O99.013]     * Anxiety disorder affecting pregnancy, antepartum [O99.340, F41.9]    Post-op Diagnosis:  Post-Op Diagnosis Codes:     * Normal labor [O80, Z37.9]     * Uterine scar from previous  delivery affecting pregnancy [O34.219]     * Maternal asthma complicating pregnancy [O99.519, J45.909]     * Maternal tobacco use in third trimester [O99.333]     * Maternal anemia in pregnancy, antepartum, third trimester [O99.013]     * Anxiety disorder affecting pregnancy, antepartum [O99.340, F41.9]    Procedure(s) (LRB):   SECTION (N/A)    Anesthesia: Spinal/Epidural    Implants:  * No implants in log *    Operative Findings: see op note    Estimated Blood Loss: * No values recorded between 2024  9:03 PM and 2024  9:40 PM *    Estimated Blood Loss has been documented.         Specimens:   Specimen (24h ago, onward)      None            [id number]

## 2024-05-31 VITALS
DIASTOLIC BLOOD PRESSURE: 71 MMHG | SYSTOLIC BLOOD PRESSURE: 120 MMHG | HEART RATE: 71 BPM | TEMPERATURE: 97 F | RESPIRATION RATE: 18 BRPM | OXYGEN SATURATION: 100 %

## 2024-05-31 LAB
BASOPHILS # BLD AUTO: 0.05 X10(3)/MCL (ref 0.01–0.08)
BASOPHILS NFR BLD AUTO: 0.4 % (ref 0.1–1.2)
EOSINOPHIL # BLD AUTO: 0.35 X10(3)/MCL (ref 0.04–0.36)
EOSINOPHIL NFR BLD AUTO: 3.1 % (ref 0.7–7)
ERYTHROCYTE [DISTWIDTH] IN BLOOD BY AUTOMATED COUNT: 15.1 % (ref 11–14.5)
HCT VFR BLD AUTO: 30.9 % (ref 36–48)
HGB BLD-MCNC: 10.1 G/DL (ref 11.8–16)
IMM GRANULOCYTES # BLD AUTO: 0.1 X10(3)/MCL (ref 0–0.03)
IMM GRANULOCYTES NFR BLD AUTO: 0.9 % (ref 0–0.5)
LYMPHOCYTES # BLD AUTO: 1.39 X10(3)/MCL (ref 1.16–3.74)
LYMPHOCYTES NFR BLD AUTO: 12.2 % (ref 20–55)
MCH RBC QN AUTO: 29.2 PG (ref 27–34)
MCHC RBC AUTO-ENTMCNC: 32.7 G/DL (ref 31–37)
MCV RBC AUTO: 89.3 FL (ref 79–99)
MONOCYTES # BLD AUTO: 0.87 X10(3)/MCL (ref 0.24–0.36)
MONOCYTES NFR BLD AUTO: 7.7 % (ref 4.7–12.5)
NEUTROPHILS # BLD AUTO: 8.6 X10(3)/MCL (ref 1.56–6.13)
NEUTROPHILS NFR BLD AUTO: 75.7 % (ref 37–73)
NRBC BLD AUTO-RTO: 0 %
PLATELET # BLD AUTO: 247 X10(3)/MCL (ref 140–371)
PMV BLD AUTO: 10.5 FL (ref 9.4–12.4)
RBC # BLD AUTO: 3.46 X10(6)/MCL (ref 4–5.1)
WBC # SPEC AUTO: 11.36 X10(3)/MCL (ref 4–11.5)

## 2024-05-31 PROCEDURE — 25000003 PHARM REV CODE 250: Performed by: OBSTETRICS & GYNECOLOGY

## 2024-05-31 PROCEDURE — 36415 COLL VENOUS BLD VENIPUNCTURE: CPT | Performed by: OBSTETRICS & GYNECOLOGY

## 2024-05-31 PROCEDURE — 85025 COMPLETE CBC W/AUTO DIFF WBC: CPT | Performed by: OBSTETRICS & GYNECOLOGY

## 2024-05-31 RX ORDER — HYDROCODONE BITARTRATE AND ACETAMINOPHEN 7.5; 325 MG/1; MG/1
1 TABLET ORAL EVERY 4 HOURS PRN
Qty: 15 TABLET | Refills: 0 | Status: SHIPPED | OUTPATIENT
Start: 2024-05-31 | End: 2024-06-10 | Stop reason: ALTCHOICE

## 2024-05-31 RX ORDER — IBUPROFEN 800 MG/1
800 TABLET ORAL EVERY 8 HOURS
Qty: 90 TABLET | Refills: 0 | Status: SHIPPED | OUTPATIENT
Start: 2024-05-31 | End: 2024-06-10 | Stop reason: ALTCHOICE

## 2024-05-31 RX ADMIN — PRENATAL VITAMINS-IRON FUMARATE 27 MG IRON-FOLIC ACID 0.8 MG TABLET 1 TABLET: at 08:05

## 2024-05-31 RX ADMIN — OXYCODONE HYDROCHLORIDE AND ACETAMINOPHEN 1 TABLET: 10; 325 TABLET ORAL at 08:05

## 2024-05-31 RX ADMIN — OXYCODONE HYDROCHLORIDE AND ACETAMINOPHEN 1 TABLET: 10; 325 TABLET ORAL at 12:05

## 2024-05-31 RX ADMIN — DOCUSATE SODIUM 200 MG: 100 CAPSULE, LIQUID FILLED ORAL at 08:05

## 2024-05-31 RX ADMIN — IBUPROFEN 800 MG: 400 TABLET, FILM COATED ORAL at 05:05

## 2024-05-31 NOTE — DISCHARGE SUMMARY
[facility]  Discharge Note  Short Stay    Procedure(s) (LRB):   SECTION (N/A)      OUTCOME: Patient tolerated treatment/procedure well without complication and is now ready for discharge.    DISPOSITION: Home or Self Care    FINAL DIAGNOSIS:  Uterine scar from previous  delivery affecting pregnancy    FOLLOWUP: In clinic om  at 8 am    DISCHARGE INSTRUCTIONS:    1. Call the office for any bleeding >2 pads/hour for >2 hours, temperature >100.4, pain that is uncontrolled with medications, or for any other concerns.  2. Pre Eclampsia precaustions  3. No driving while on narcotics.     Clinical Reference Documents Added to Patient Instructions         Document     ( DELIVERY) DISCHARGE INSTRUCTIONS (ENGLISH)    POSTPARTUM DEPRESSION DISCHARGE INSTRUCTIONS (ENGLISH)            TIME SPENT ON DISCHARGE: 30 minutes

## 2024-05-31 NOTE — DISCHARGE SUMMARY
Delivery Discharge Summary  Obstetrics        Discharge Provider: Regla Christensen MD    Admission date: 2024  Discharge date: 2024    Admit Dx:   Patient Active Problem List   Diagnosis    Asthma    Mild depression    Anemia during pregnancy in third trimester    Asthma affecting pregnancy in third trimester    Anxiety during pregnancy in third trimester, antepartum    Uterine scar from previous  delivery affecting pregnancy    Rubella non-immune status, antepartum    Tobacco smoking affecting pregnancy in third trimester    Light for dates affecting management of mother, third trimester, not applicable or unspecified fetus    Alteration in comfort associated with uterine contractions     Discharge Dx:    Repeat  at 38 weeks  Active labor    Hospital Course:  Jamia Smith is a 28 y.o. now  who was admitted on 2024 for repeat  d/t spontaneous active labor. Patient delivered a viable . Please see delivery note for further details. Pt was in stable condition post delivery.  Her postpartum course was uncomplicated. On the date of discharge, patient's pain is controlled with oral pain medications. She is tolerating ambulation without SOB or CP, and PO diet without N/V. Reported lochia is within the normal range. Pt in stable condition and ready for discharge.       DISCHARGE PHYSICAL EXAM  Vitals:    24 0028 24 0300 24 0725 24 0846   BP:  119/71 120/71    BP Location:   Right arm    Patient Position:   Lying    Pulse:  81 71    Resp: 18 18  18   Temp:  97 °F (36.1 °C) 97.2 °F (36.2 °C)    TempSrc:  Oral Temporal    SpO2:            General: no acute distress  Abdomen: soft, appropriately tender for POD #2. No guarding,  non-distended; Fundus firm periumbilical  Incision: Intact, no evidence of infection,   Extremities: non-tender, symmetric, trace BLE edema, neg Maria Ines's Bilateral     Pertinent studies:  Lab Results   Component Value  Date/Time    GROUPTRH O POS 11/11/2022 12:00 AM    ABORH O POS 05/29/2024 06:16 PM    ABSCREEN NEG 05/29/2024 06:16 PM     Recent Results (from the past 336 hour(s))   CBC with Differential    Collection Time: 05/31/24  5:45 AM   Result Value Ref Range    WBC 11.36 4.00 - 11.50 x10(3)/mcL    Hgb 10.1 (L) 11.8 - 16.0 g/dL    Hct 30.9 (L) 36.0 - 48.0 %    Platelet 247 140 - 371 x10(3)/mcL   CBC with Differential    Collection Time: 05/30/24  3:02 AM   Result Value Ref Range    WBC 18.19 (H) 4.00 - 11.50 x10(3)/mcL    Hgb 10.7 (L) 11.8 - 16.0 g/dL    Hct 31.6 (L) 36.0 - 48.0 %    Platelet 211 140 - 371 x10(3)/mcL   CBC with Differential    Collection Time: 05/29/24  6:16 PM   Result Value Ref Range    WBC 16.65 (H) 4.00 - 11.50 x10(3)/mcL    Hgb 11.6 (L) 11.8 - 16.0 g/dL    Hct 35.1 (L) 36.0 - 48.0 %    Platelet 230 140 - 371 x10(3)/mcL         Disposition: To home, self care    Follow Up: June 6 at 8 am    Patient Instructions:   1. Call the office for any bleeding >2 pads/hour for >2 hours, temperature >100.4, pain that is uncontrolled with medications, or for any other concerns.  2. Pre Eclampsia precaustions  3. No driving while on narcotics.        Regla Christensen MD

## 2024-05-31 NOTE — NURSING
PATIENT INSTRUCTED ON DISCHARGE INSTRUCTIONS. PATIENT VERBALIZED UNDERSTANDING. PATIENT DISCHARGED IN STABLE CONDITION VIA WHEELCHAIR WITH FAMILY BY SIDE.

## 2024-05-31 NOTE — DISCHARGE INSTRUCTIONS
Postoperative Instructions      Decreased activity until follow up appointment.  No driving for 2 weeks, or while taking pain medications.   Pelvic rest: no intercourse, tampons, or douching.  No heavy lifting or straining. No pushing or pulling.  Showers only, no tub baths.  Notify MD for:   excessive bright red vaginal bleeding  Fever  pain unrelieved by medication  excessive nausea or vomiting  foul odor or drainage from incision site/vagina  redness or swelling around incision sites.

## 2024-06-06 ENCOUNTER — CLINICAL SUPPORT (OUTPATIENT)
Dept: OBSTETRICS AND GYNECOLOGY | Facility: CLINIC | Age: 29
End: 2024-06-06
Payer: MEDICAID

## 2024-06-06 VITALS
SYSTOLIC BLOOD PRESSURE: 122 MMHG | BODY MASS INDEX: 22.32 KG/M2 | HEIGHT: 64 IN | WEIGHT: 130.75 LBS | DIASTOLIC BLOOD PRESSURE: 70 MMHG

## 2024-06-06 DIAGNOSIS — Z48.02 ENCOUNTER FOR STAPLE REMOVAL: Primary | ICD-10-CM

## 2024-06-06 NOTE — PROGRESS NOTES
"    Chief Complaint     Suture / Staple Removal (Staple Removal,  2024)    HPI:     Patient is a 28 y.o.  presents today for staple removal.     LMP: 23  Frequency: monthly  Cycle length: 7 days   Flow: heavy  Intermenstrual bleeding: No  Postcoital bleeding: No  Dysmenorrhea: Yes moderate  Sexually active: yes   Dyspareunia: No  Contraception: none   H/o STI: No   Last pap: 2/15/22 neg w/ neg gc/cz  H/o abnl pap: No   Colposcopy: no  Ga      Review of Systems:       Review of Systems     Physical Exam:    /70   Ht 5' 4" (1.626 m)   Wt 59.3 kg (130 lb 11.7 oz)   LMP 2023   Breastfeeding Yes   BMI 22.44 kg/m²     Physical Exam     No signs or symptoms of infection noted to surgical incision. Staples removed and steri-strips applied. Patient tolerated well    Assessment:   1. Encounter for staple removal        Plan:   Patient doing well today with no complaints.    Instructed to keep surgical incision clean and dry.   Shower bathe only until steri-strips fall off. Notify MD of signs/symptoms of infection reviewed with patient.   ED precautions given. Patient verbalized understanding.    RTC for postpartum exam.    "

## 2024-06-10 ENCOUNTER — CLINICAL SUPPORT (OUTPATIENT)
Dept: OBSTETRICS AND GYNECOLOGY | Facility: CLINIC | Age: 29
End: 2024-06-10
Payer: MEDICAID

## 2024-06-10 VITALS
DIASTOLIC BLOOD PRESSURE: 70 MMHG | WEIGHT: 124.81 LBS | SYSTOLIC BLOOD PRESSURE: 116 MMHG | BODY MASS INDEX: 21.31 KG/M2 | HEIGHT: 64 IN

## 2024-06-10 DIAGNOSIS — Z98.891 S/P CESAREAN SECTION: Primary | ICD-10-CM

## 2024-06-10 DIAGNOSIS — Z48.02 ENCOUNTER FOR STAPLE REMOVAL: ICD-10-CM

## 2024-06-10 NOTE — PROGRESS NOTES
"  Chief Complaint     Wound Check (C/o "I still have a staple on my incision. When I left the hospital they put some steri-strips on my incision. I think one covered a staple and it was missed the other day for my staple removal appt. No pain, just a pulling sensation.")    HPI:     Patient is a 28 y.o.  presents today for staple removal.     LMP: 23  Frequency: monthly  Cycle length: 7 days   Flow: heavy  Intermenstrual bleeding: No  Postcoital bleeding: No  Dysmenorrhea: Yes moderate  Sexually active: yes   Dyspareunia: No  Contraception: none   H/o STI: No   Last pap: 2/15/22 neg w/ neg gc/cz  H/o abnl pap: No   Colposcopy: no  Ga      Review of Systems:       Review of Systems     Physical Exam:    /70 (BP Location: Left arm, Patient Position: Sitting)   Ht 5' 4" (1.626 m)   Wt 56.6 kg (124 lb 12.8 oz)   LMP 2023   Breastfeeding No   BMI 21.42 kg/m²     Physical Exam     No signs or symptoms of infection noted to surgical incision. One staple noted to incision. Staple removed. Patient tolerated well.    Assessment:   1. S/P  section    2. Encounter for staple removal             Plan:   1. S/P  section    2. Encounter for staple removal        Patient doing well today with no complaints.   Instructed to keep surgical incision clean and dry.   Notify MD of signs/symptoms of infection reviewed with patient.   ED precautions given. Patient verbalized understanding.    RTC for postpartum exam.      "

## 2024-07-01 PROBLEM — O36.5930 LIGHT FOR DATES AFFECTING MANAGEMENT OF MOTHER, THIRD TRIMESTER, NOT APPLICABLE OR UNSPECIFIED FETUS: Status: RESOLVED | Noted: 2024-03-06 | Resolved: 2024-07-01

## 2024-07-11 ENCOUNTER — POSTPARTUM VISIT (OUTPATIENT)
Dept: OBSTETRICS AND GYNECOLOGY | Facility: CLINIC | Age: 29
End: 2024-07-11
Payer: MEDICAID

## 2024-07-11 VITALS
SYSTOLIC BLOOD PRESSURE: 122 MMHG | BODY MASS INDEX: 21.51 KG/M2 | DIASTOLIC BLOOD PRESSURE: 70 MMHG | HEIGHT: 64 IN | WEIGHT: 126 LBS

## 2024-07-11 DIAGNOSIS — F32.A MILD DEPRESSION: ICD-10-CM

## 2024-07-11 PROBLEM — O99.013 ANEMIA DURING PREGNANCY IN THIRD TRIMESTER: Status: RESOLVED | Noted: 2023-05-15 | Resolved: 2024-07-11

## 2024-07-11 PROBLEM — Z28.39 RUBELLA NON-IMMUNE STATUS, ANTEPARTUM: Status: RESOLVED | Noted: 2023-12-04 | Resolved: 2024-07-11

## 2024-07-11 PROBLEM — O99.513 ASTHMA AFFECTING PREGNANCY IN THIRD TRIMESTER: Status: RESOLVED | Noted: 2023-11-06 | Resolved: 2024-07-11

## 2024-07-11 PROBLEM — O99.343 ANXIETY DURING PREGNANCY IN THIRD TRIMESTER, ANTEPARTUM: Status: RESOLVED | Noted: 2023-11-06 | Resolved: 2024-07-11

## 2024-07-11 PROBLEM — N85.8 ALTERATION IN COMFORT ASSOCIATED WITH UTERINE CONTRACTIONS: Status: RESOLVED | Noted: 2024-05-29 | Resolved: 2024-07-11

## 2024-07-11 PROBLEM — F41.9 ANXIETY DURING PREGNANCY IN THIRD TRIMESTER, ANTEPARTUM: Status: RESOLVED | Noted: 2023-11-06 | Resolved: 2024-07-11

## 2024-07-11 PROBLEM — J45.909 ASTHMA AFFECTING PREGNANCY IN THIRD TRIMESTER: Status: RESOLVED | Noted: 2023-11-06 | Resolved: 2024-07-11

## 2024-07-11 PROBLEM — O09.899 RUBELLA NON-IMMUNE STATUS, ANTEPARTUM: Status: RESOLVED | Noted: 2023-12-04 | Resolved: 2024-07-11

## 2024-07-11 PROBLEM — O99.333 TOBACCO SMOKING AFFECTING PREGNANCY IN THIRD TRIMESTER: Status: RESOLVED | Noted: 2024-03-06 | Resolved: 2024-07-11

## 2024-07-11 PROBLEM — O34.219 UTERINE SCAR FROM PREVIOUS CESAREAN DELIVERY AFFECTING PREGNANCY: Status: RESOLVED | Noted: 2023-11-06 | Resolved: 2024-07-11

## 2024-08-03 ENCOUNTER — HOSPITAL ENCOUNTER (EMERGENCY)
Facility: HOSPITAL | Age: 29
Discharge: HOME | End: 2024-08-03
Attending: GENERAL ACUTE CARE HOSPITAL
Payer: MEDICAID

## 2024-08-03 VITALS
OXYGEN SATURATION: 98 % | TEMPERATURE: 98 F | SYSTOLIC BLOOD PRESSURE: 118 MMHG | WEIGHT: 122 LBS | HEIGHT: 61 IN | DIASTOLIC BLOOD PRESSURE: 65 MMHG | RESPIRATION RATE: 16 BRPM | BODY MASS INDEX: 23.03 KG/M2 | HEART RATE: 71 BPM

## 2024-08-03 DIAGNOSIS — R05.9 COUGH: ICD-10-CM

## 2024-08-03 DIAGNOSIS — J45.901 EXACERBATION OF ASTHMA, UNSPECIFIED ASTHMA SEVERITY, UNSPECIFIED WHETHER PERSISTENT: Primary | ICD-10-CM

## 2024-08-03 LAB
FLUAV AG UPPER RESP QL IA.RAPID: NOT DETECTED
FLUBV AG UPPER RESP QL IA.RAPID: NOT DETECTED
SARS-COV-2 RNA RESP QL NAA+PROBE: NOT DETECTED

## 2024-08-03 PROCEDURE — 99284 EMERGENCY DEPT VISIT MOD MDM: CPT | Mod: 25

## 2024-08-03 PROCEDURE — 0240U COVID/FLU A&B PCR: CPT

## 2024-08-03 PROCEDURE — 25000003 PHARM REV CODE 250

## 2024-08-03 PROCEDURE — 94640 AIRWAY INHALATION TREATMENT: CPT

## 2024-08-03 PROCEDURE — 25000242 PHARM REV CODE 250 ALT 637 W/ HCPCS

## 2024-08-03 PROCEDURE — 96372 THER/PROPH/DIAG INJ SC/IM: CPT

## 2024-08-03 PROCEDURE — 94761 N-INVAS EAR/PLS OXIMETRY MLT: CPT

## 2024-08-03 PROCEDURE — 63600175 PHARM REV CODE 636 W HCPCS

## 2024-08-03 RX ORDER — DEXAMETHASONE SODIUM PHOSPHATE 4 MG/ML
8 INJECTION, SOLUTION INTRA-ARTICULAR; INTRALESIONAL; INTRAMUSCULAR; INTRAVENOUS; SOFT TISSUE
Status: COMPLETED | OUTPATIENT
Start: 2024-08-03 | End: 2024-08-03

## 2024-08-03 RX ORDER — ALBUTEROL SULFATE 0.83 MG/ML
1.25 SOLUTION RESPIRATORY (INHALATION)
Status: DISCONTINUED | OUTPATIENT
Start: 2024-08-03 | End: 2024-08-03

## 2024-08-03 RX ORDER — BENZONATATE 100 MG/1
100 CAPSULE ORAL 3 TIMES DAILY PRN
Status: DISCONTINUED | OUTPATIENT
Start: 2024-08-03 | End: 2024-08-03 | Stop reason: HOSPADM

## 2024-08-03 RX ORDER — IPRATROPIUM BROMIDE AND ALBUTEROL SULFATE 2.5; .5 MG/3ML; MG/3ML
3 SOLUTION RESPIRATORY (INHALATION)
Status: COMPLETED | OUTPATIENT
Start: 2024-08-03 | End: 2024-08-03

## 2024-08-03 RX ORDER — MONTELUKAST SODIUM 10 MG/1
10 TABLET ORAL NIGHTLY
Qty: 30 TABLET | Refills: 0 | Status: SHIPPED | OUTPATIENT
Start: 2024-08-03 | End: 2024-09-02

## 2024-08-03 RX ORDER — ALBUTEROL SULFATE 90 UG/1
1-2 INHALANT RESPIRATORY (INHALATION) EVERY 6 HOURS PRN
Qty: 6.7 G | Refills: 1 | Status: SHIPPED | OUTPATIENT
Start: 2024-08-03 | End: 2025-08-03

## 2024-08-03 RX ORDER — BENZONATATE 100 MG/1
100 CAPSULE ORAL 3 TIMES DAILY PRN
Qty: 30 CAPSULE | Refills: 0 | Status: SHIPPED | OUTPATIENT
Start: 2024-08-03 | End: 2024-08-13

## 2024-08-03 RX ADMIN — IPRATROPIUM BROMIDE AND ALBUTEROL SULFATE 3 ML: .5; 3 SOLUTION RESPIRATORY (INHALATION) at 06:08

## 2024-08-03 RX ADMIN — DEXAMETHASONE SODIUM PHOSPHATE 8 MG: 4 INJECTION, SOLUTION INTRA-ARTICULAR; INTRALESIONAL; INTRAMUSCULAR; INTRAVENOUS; SOFT TISSUE at 07:08

## 2024-08-03 RX ADMIN — BENZONATATE 100 MG: 100 CAPSULE ORAL at 06:08

## 2024-11-06 ENCOUNTER — HOSPITAL ENCOUNTER (EMERGENCY)
Facility: HOSPITAL | Age: 29
Discharge: HOME | End: 2024-11-06
Attending: FAMILY MEDICINE
Payer: MEDICAID

## 2024-11-06 VITALS
RESPIRATION RATE: 17 BRPM | HEIGHT: 61 IN | BODY MASS INDEX: 23.6 KG/M2 | OXYGEN SATURATION: 96 % | SYSTOLIC BLOOD PRESSURE: 125 MMHG | WEIGHT: 125 LBS | DIASTOLIC BLOOD PRESSURE: 76 MMHG | HEART RATE: 74 BPM | TEMPERATURE: 99 F

## 2024-11-06 DIAGNOSIS — M25.571 RIGHT ANKLE PAIN: ICD-10-CM

## 2024-11-06 LAB — B-HCG UR QL: NEGATIVE

## 2024-11-06 PROCEDURE — 63600175 PHARM REV CODE 636 W HCPCS: Performed by: PHYSICIAN ASSISTANT

## 2024-11-06 PROCEDURE — 96372 THER/PROPH/DIAG INJ SC/IM: CPT | Performed by: PHYSICIAN ASSISTANT

## 2024-11-06 PROCEDURE — 99284 EMERGENCY DEPT VISIT MOD MDM: CPT | Mod: 25

## 2024-11-06 PROCEDURE — 81025 URINE PREGNANCY TEST: CPT | Performed by: PHYSICIAN ASSISTANT

## 2024-11-06 RX ORDER — KETOROLAC TROMETHAMINE 30 MG/ML
60 INJECTION, SOLUTION INTRAMUSCULAR; INTRAVENOUS
Status: COMPLETED | OUTPATIENT
Start: 2024-11-06 | End: 2024-11-06

## 2024-11-06 RX ORDER — METHOCARBAMOL 750 MG/1
1500 TABLET, FILM COATED ORAL 3 TIMES DAILY
Qty: 42 TABLET | Refills: 0 | Status: SHIPPED | OUTPATIENT
Start: 2024-11-06 | End: 2024-11-13

## 2024-11-06 RX ORDER — DICLOFENAC SODIUM 50 MG/1
50 TABLET, DELAYED RELEASE ORAL 3 TIMES DAILY
Qty: 21 TABLET | Refills: 0 | Status: SHIPPED | OUTPATIENT
Start: 2024-11-06 | End: 2024-11-13

## 2024-11-06 RX ADMIN — KETOROLAC TROMETHAMINE 60 MG: 60 INJECTION, SOLUTION INTRAMUSCULAR at 01:11

## 2024-11-06 NOTE — ED PROVIDER NOTES
"Encounter Date: 2024       History     Chief Complaint   Patient presents with    Ankle Pain     R ankle pain after "rolling" her ankle 3 times while walking last night.     28-year-old female presents to ED for evaluation of right ankle pain and swelling.  Patient reports that she was sprain her ankle 3 times yesterday when she was rolling it.  States she took ibuprofen and Tylenol last night.  Reports that she woke up this morning and her ankle is more swollen and she was having difficulty walking on it.  Denies any fall.    The history is provided by the patient. No  was used.     Review of patient's allergies indicates:   Allergen Reactions    Iodinated contrast media Shortness Of Breath    Iodine Rash and Swelling    Shrimp      Past Medical History:   Diagnosis Date    Anxiety disorder, unspecified     not currently    Asthma     Dormant    H. pylori infection     Marijuana user 2023    Vitamin D deficiency      Past Surgical History:   Procedure Laterality Date     SECTION N/A 2023    Procedure:  SECTION;  Surgeon: Davide Hood MD;  Location: [facility] OR;  Service: OB/GYN;  Laterality: N/A;     SECTION N/A 2024    Procedure:  SECTION;  Surgeon: Davide Hood MD;  Location: [facility] OR;  Service: OB/GYN;  Laterality: N/A;    CHOLECYSTECTOMY       Family History   Problem Relation Name Age of Onset    No Known Problems Paternal Grandfather      No Known Problems Paternal Grandmother      No Known Problems Maternal Grandmother      No Known Problems Maternal Grandfather      No Known Problems Father      No Known Problems Mother      No Known Problems Brother      No Known Problems Sister      Breast cancer Neg Hx      Cervical cancer Neg Hx      Uterine cancer Neg Hx      Ovarian cancer Neg Hx      Colon cancer Neg Hx       Social History     Tobacco Use    Smoking status: Former     Types: Cigarettes     " "Start date: 2024     Quit date: 2016     Years since quittin.8     Passive exposure: Current    Smokeless tobacco: Never    Tobacco comments:     3/21/23 per pt "I smoke  only 1-3 cigarettes a day."     Recently quit   Substance Use Topics    Alcohol use: Not Currently    Drug use: Not Currently     Types: Marijuana     Review of Systems   Constitutional:  Negative for chills, fatigue and fever.   Respiratory:  Negative for shortness of breath.    Cardiovascular:  Negative for chest pain.   Gastrointestinal:  Negative for abdominal pain, diarrhea, nausea and vomiting.   Genitourinary:  Negative for dysuria, flank pain, frequency and urgency.   Musculoskeletal:  Positive for arthralgias, joint swelling and myalgias. Negative for back pain.   All other systems reviewed and are negative.      Physical Exam     Initial Vitals [24 1239]   BP Pulse Resp Temp SpO2   111/68 82 18 98.6 °F (37 °C) 96 %      MAP       --         Physical Exam    Nursing note and vitals reviewed.  Constitutional: She appears well-developed and well-nourished.   HENT:   Head: Normocephalic and atraumatic.   Right Ear: Tympanic membrane and external ear normal.   Left Ear: Tympanic membrane and external ear normal. Mouth/Throat: Uvula is midline, oropharynx is clear and moist and mucous membranes are normal. No trismus in the jaw. No uvula swelling. No oropharyngeal exudate, posterior oropharyngeal edema or posterior oropharyngeal erythema.   Eyes: Conjunctivae are normal. Pupils are equal, round, and reactive to light.   Neck: Neck supple.   Normal range of motion.  Cardiovascular:  Normal rate, regular rhythm and normal heart sounds.           Pulmonary/Chest: Breath sounds normal. She has no wheezes. She has no rhonchi. She has no rales.   Abdominal: Abdomen is soft. Bowel sounds are normal. There is no abdominal tenderness.   Musculoskeletal:         General: Normal range of motion.      Cervical back: Normal range of " motion and neck supple.      Right ankle: Swelling present. Tenderness present over the lateral malleolus. Normal range of motion.      Comments: Tenderness and swelling noted to lateral malleolus.  Patient was full range of motion.  DP pulses 2+     Neurological: She is alert and oriented to person, place, and time.   Skin: Skin is warm and dry.   Psychiatric: She has a normal mood and affect.       ED Course   Procedures  Labs Reviewed   PREGNANCY TEST, URINE RAPID - Normal       Result Value    hCG Qualitative, Urine Negative            Imaging Results              X-Ray Ankle Complete Right (Preliminary result)  Result time 11/06/24 13:01:31      Wet Read by iWlma Lindsay PA (11/06/24 13:01:31, [facility], Emergency Medicine)    No acute fracture or dislocation noted.                                     Medications   ketorolac injection 60 mg (60 mg Intramuscular Given 11/6/24 1316)     Medical Decision Making  28-year-old female presents to ED for evaluation of right ankle pain and swelling.  Patient reports that she was sprain her ankle 3 times yesterday when she was rolling it.  States she took ibuprofen and Tylenol last night.  Reports that she woke up this morning and her ankle is more swollen and she was having difficulty walking on it.  Denies any fall.    Differential diagnosis includes but isn't limited to contusion, sprain, strain, fracture, dislocation    Amount and/or Complexity of Data Reviewed  Radiology: ordered and independent interpretation performed.  Discussion of management or test interpretation with external provider(s): Patient GCS 15 and neuro intact.  Ambulatory with steady gait presents to ED for evaluation of right ankle pain and swelling.  Swelling noted to lateral malleolus.  Patient was full range of motion.  Reports spraining her ankle 3 times yesterday.  Patient given Toradol here.  X-ray obtained showing no acute fracture.  Will Ace wrap and give short  course of NSAIDs and muscle relaxers.  Discussed return ED precautions.  Patient verbalizes understanding and agrees plan care    Risk  Prescription drug management.                                      Clinical Impression:  Final diagnoses:  [M25.571] Right ankle pain          ED Disposition Condition    Discharge Stable          ED Prescriptions       Medication Sig Dispense Start Date End Date Auth. Provider    diclofenac (VOLTAREN) 50 MG EC tablet Take 1 tablet (50 mg total) by mouth 3 (three) times daily. for 7 days 21 tablet 11/6/2024 11/13/2024 Wilma Lindsay PA    methocarbamoL (ROBAXIN) 750 MG Tab Take 2 tablets (1,500 mg total) by mouth 3 (three) times daily. for 7 days 42 tablet 11/6/2024 11/13/2024 Wilma Lindsay PA          Follow-up Information       Follow up With Specialties Details Why Contact Info    Yann Norris APRN Family Medicine   [address] [place] [address]  [address]  F[facility] CLINIC [facility] [address]  [phone]               Wilma Lindsay PA  11/06/24 [id number]

## 2024-11-06 NOTE — DISCHARGE INSTRUCTIONS
Use ice and elevation, 20 minutes on and 20 minutes off.  Wear Ace bandage for support.    You have been prescribed Diclofenac for pain. This is an Non-Steroidal Anti-Inflammatory (NSAID) Medication. Please do not take any additional NSAIDs while you are taking this medication including (Advil, Aleve, Motrin, Ibuprofen, Mobic\meloxicam, Naprosyn, Toradol, ketoralac, etc.). Please stop taking this medication if you experience: weakness, itching, yellow skin or eyes, joint pains, vomiting blood, blood or black stools, unusual weight gain, or swelling in your arms, legs, hands, or feet.     You have been prescribed Robaxin (Methocarbamol) for muscle spasms/pain. Please do not take this medication while working, drinking alcohol, swimming, or while driving/operating heavy machinery. This medication may cause drowsiness, dizziness, impair judgment, and reduce physical capabilities.You should not drive, operate heavy machinery, or make life changing decisions while taking this medication.

## 2024-11-08 ENCOUNTER — HOSPITAL ENCOUNTER (OUTPATIENT)
Dept: RADIOLOGY | Facility: HOSPITAL | Age: 29
Discharge: HOME | End: 2024-11-08
Attending: SURGERY
Payer: MEDICAID

## 2024-11-08 DIAGNOSIS — L84 CORNS AND CALLOSITIES: ICD-10-CM

## 2024-11-11 ENCOUNTER — HOSPITAL ENCOUNTER (OUTPATIENT)
Dept: RADIOLOGY | Facility: HOSPITAL | Age: 29
Discharge: HOME | End: 2024-11-11
Attending: SURGERY
Payer: MEDICAID

## 2024-11-11 PROCEDURE — 76882 US LMTD JT/FCL EVL NVASC XTR: CPT | Mod: TC,LT

## 2024-12-03 ENCOUNTER — OFFICE VISIT (OUTPATIENT)
Dept: FAMILY MEDICINE | Facility: CLINIC | Age: 29
End: 2024-12-03
Payer: MEDICAID

## 2024-12-03 VITALS
TEMPERATURE: 98 F | DIASTOLIC BLOOD PRESSURE: 60 MMHG | BODY MASS INDEX: 22.73 KG/M2 | HEART RATE: 86 BPM | HEIGHT: 61 IN | WEIGHT: 120.38 LBS | SYSTOLIC BLOOD PRESSURE: 108 MMHG | OXYGEN SATURATION: 98 %

## 2024-12-03 DIAGNOSIS — R10.84 GENERALIZED ABDOMINAL PAIN: ICD-10-CM

## 2024-12-03 DIAGNOSIS — R11.0 NAUSEA: ICD-10-CM

## 2024-12-03 DIAGNOSIS — R10.13 EPIGASTRIC PAIN: Primary | ICD-10-CM

## 2024-12-03 LAB
BILIRUB SERPL-MCNC: NEGATIVE MG/DL
BLOOD URINE, POC: NORMAL
CLARITY, POC UA: CLEAR
COLOR, POC UA: YELLOW
GLUCOSE UR QL STRIP: NEGATIVE
KETONES UR QL STRIP: NEGATIVE
LEUKOCYTE ESTERASE URINE, POC: NEGATIVE
NITRITE, POC UA: NEGATIVE
PH, POC UA: 6.5
PROTEIN, POC: NEGATIVE
SPECIFIC GRAVITY, POC UA: 1.03
UROBILINOGEN, POC UA: NORMAL

## 2024-12-03 PROCEDURE — 81002 URINALYSIS NONAUTO W/O SCOPE: CPT | Mod: ,,, | Performed by: NURSE PRACTITIONER

## 2024-12-03 PROCEDURE — 1160F RVW MEDS BY RX/DR IN RCRD: CPT | Mod: CPTII,,, | Performed by: NURSE PRACTITIONER

## 2024-12-03 PROCEDURE — 3078F DIAST BP <80 MM HG: CPT | Mod: CPTII,,, | Performed by: NURSE PRACTITIONER

## 2024-12-03 PROCEDURE — 3074F SYST BP LT 130 MM HG: CPT | Mod: CPTII,,, | Performed by: NURSE PRACTITIONER

## 2024-12-03 PROCEDURE — 1159F MED LIST DOCD IN RCRD: CPT | Mod: CPTII,,, | Performed by: NURSE PRACTITIONER

## 2024-12-03 PROCEDURE — 99213 OFFICE O/P EST LOW 20 MIN: CPT | Mod: ,,, | Performed by: NURSE PRACTITIONER

## 2024-12-03 PROCEDURE — 3008F BODY MASS INDEX DOCD: CPT | Mod: CPTII,,, | Performed by: NURSE PRACTITIONER

## 2024-12-03 NOTE — PROGRESS NOTES
Patient ID: Jamia Smith  : 1995     Chief Complaint: Nausea and Abdominal Pain (H-pylori issues)    Allergies: Patient is allergic to iodinated contrast media, iodine, and shrimp.     History of Present Illness:  The patient is a 28 y.o. White female who presents to clinic for evaluation and management with a chief complaint of Nausea and Abdominal Pain (H-pylori issues)   Patient reports nausea, abdominal pain, trouble eating. Reports these are all very similar to when she had hpylori infection and would like to be rechecked.     Nausea  This is a recurrent problem. The current episode started 1 to 4 weeks ago. The problem occurs intermittently. The problem has been waxing and waning. Associated symptoms include abdominal pain, anorexia, nausea and vomiting. Pertinent negatives include no arthralgias, change in bowel habit, chest pain, chills, congestion, coughing, diaphoresis, fatigue, fever, headaches, joint swelling, myalgias, neck pain, numbness, rash, sore throat, swollen glands, urinary symptoms, vertigo, visual change or weakness. The symptoms are aggravated by eating. She has tried nothing for the symptoms.        Past Medical History:  has a past medical history of Anxiety disorder, unspecified, Asthma, H. pylori infection, Marijuana user, and Vitamin D deficiency.    Social History:  reports that she quit smoking about 8 years ago. Her smoking use included cigarettes. She started smoking about 7 months ago. She has been exposed to tobacco smoke. She has never used smokeless tobacco. She reports that she does not currently use alcohol. She reports that she does not currently use drugs after having used the following drugs: Marijuana.    Care Team: Patient Care Team:  Yann Norris APRN as PCP - General (Family Medicine)  Davide Hood MD as Consulting Physician (Obstetrics and Gynecology)     Current Medications:  Current Outpatient Medications   Medication Instructions    albuterol  "(PROVENTIL/VENTOLIN HFA) 90 mcg/actuation inhaler 1-2 puffs, Inhalation, Every 6 hours PRN, Rescue       Review of Systems   Constitutional:  Negative for chills, diaphoresis, fatigue and fever.   HENT:  Negative for nasal congestion and sore throat.    Respiratory:  Negative for cough.    Cardiovascular:  Negative for chest pain.   Gastrointestinal:  Positive for abdominal pain, anorexia, nausea and vomiting. Negative for change in bowel habit.   Musculoskeletal:  Negative for arthralgias, joint swelling, myalgias and neck pain.   Integumentary:  Negative for rash.   Neurological:  Negative for vertigo, weakness, numbness and headaches.        Visit Vitals  /60 (BP Location: Right arm, Patient Position: Sitting)   Pulse 86   Temp 97.5 °F (36.4 °C) (Temporal)   Ht 5' 0.98" (1.549 m)   Wt 54.6 kg (120 lb 6.4 oz)   LMP 11/05/2024 (Approximate)   SpO2 98%   Breastfeeding No   BMI 22.76 kg/m²       Physical Exam  Vitals and nursing note reviewed.   Constitutional:       General: She is not in acute distress.     Appearance: Normal appearance.   Cardiovascular:      Rate and Rhythm: Normal rate and regular rhythm.      Pulses: Normal pulses.      Heart sounds: No murmur heard.  Pulmonary:      Effort: Pulmonary effort is normal.      Breath sounds: No wheezing or rhonchi.   Abdominal:      General: Abdomen is flat. There is no distension.      Palpations: Abdomen is soft. There is no mass.      Tenderness: There is generalized abdominal tenderness. There is no guarding or rebound.      Hernia: No hernia is present.   Musculoskeletal:         General: No swelling, tenderness or deformity.   Skin:     General: Skin is warm and dry.      Coloration: Skin is not jaundiced.      Findings: No rash.      Comments: Multiple tattoos   Neurological:      Mental Status: She is alert and oriented to person, place, and time.      Cranial Nerves: No cranial nerve deficit.   Psychiatric:         Mood and Affect: Mood normal.    "      Behavior: Behavior normal.          Labs Reviewed:  Chemistry:  Lab Results   Component Value Date     12/01/2023    K 4.0 12/01/2023    BUN 10.0 12/01/2023    CREATININE 0.51 (L) 12/01/2023    EGFRNORACEVR >90 12/01/2023    GLUCOSE 90 12/01/2023    CALCIUM 8.9 12/01/2023    ALKPHOS 60 12/01/2023    LABPROT 6.3 12/01/2023    ALBUMIN 3.8 12/01/2023    AST 24 12/01/2023    ALT 16 12/01/2023    ISPSRJML98VD 32.8 12/01/2023    TSH 1.200 04/29/2024    UWVOFD5XEEW 0.87 04/29/2024        Lab Results   Component Value Date    HGBA1C 5.2 12/01/2023        Hematology:  Lab Results   Component Value Date    WBC 11.36 05/31/2024    RBC 3.46 (L) 05/31/2024    HGB 10.1 (L) 05/31/2024    HCT 30.9 (L) 05/31/2024    MCV 89.3 05/31/2024    MCH 29.2 05/31/2024    MCHC 32.7 05/31/2024    RDW 15.1 (H) 05/31/2024     05/31/2024    MPV 10.5 05/31/2024       Lipid Panel:  Lab Results   Component Value Date    CHOL 160 12/01/2023    HDL 58 12/01/2023    LDLDIRECT 84.4 12/01/2023    TRIG 89 12/01/2023        Assessment & Plan:  1. Epigastric pain  -     Helicobacter Pylori Antigen Fecal EIA; Future; Expected date: 12/03/2024    2. Nausea  -     Helicobacter Pylori Antigen Fecal EIA; Future; Expected date: 12/03/2024    3. Generalized abdominal pain  Comments:  negative ua  Orders:  -     POCT urine dipstick without microscope         Future Appointments   Date Time Provider Department Center   7/18/2025  9:00 AM Tawnya Kang, [facility]       No follow-ups on file. Call sooner if needed.    DEREJE SOARES        Lab Frequency Next Occurrence   CBC auto differential Once 11/06/2023   Basic metabolic panel Once 11/06/2023

## 2024-12-06 PROCEDURE — 87338 HPYLORI STOOL AG IA: CPT | Performed by: NURSE PRACTITIONER

## 2024-12-10 ENCOUNTER — TELEPHONE (OUTPATIENT)
Dept: FAMILY MEDICINE | Facility: CLINIC | Age: 29
End: 2024-12-10
Payer: MEDICAID

## 2025-01-03 ENCOUNTER — TELEPHONE (OUTPATIENT)
Dept: FAMILY MEDICINE | Facility: CLINIC | Age: 30
End: 2025-01-03
Payer: MEDICAID

## 2025-01-03 NOTE — TELEPHONE ENCOUNTER
Patient is aware that their are no openings today. She verbalized understanding to go to [facility] through [facility].

## 2025-01-13 ENCOUNTER — HOSPITAL ENCOUNTER (OUTPATIENT)
Dept: PREADMISSION TESTING | Facility: HOSPITAL | Age: 30
Discharge: HOME | End: 2025-01-13
Attending: SURGERY
Payer: MEDICAID

## 2025-01-13 VITALS — HEIGHT: 64 IN | WEIGHT: 120.38 LBS | BODY MASS INDEX: 20.55 KG/M2

## 2025-01-13 DIAGNOSIS — B07.0 PLANTAR WART OF LEFT FOOT: Primary | ICD-10-CM

## 2025-01-13 LAB
ALBUMIN SERPL-MCNC: 4.6 G/DL (ref 3.4–5)
ALBUMIN/GLOB SERPL: 1.8 RATIO
ALP SERPL-CCNC: 63 UNIT/L (ref 50–144)
ALT SERPL-CCNC: 22 UNIT/L (ref 1–45)
ANION GAP SERPL CALC-SCNC: 3 MEQ/L (ref 2–13)
APTT PPP: 27.1 SECONDS (ref 23–29.4)
AST SERPL-CCNC: 26 UNIT/L (ref 14–36)
BASOPHILS # BLD AUTO: 0.05 X10(3)/MCL (ref 0.01–0.08)
BASOPHILS NFR BLD AUTO: 0.7 % (ref 0.1–1.2)
BILIRUB SERPL-MCNC: 0.5 MG/DL (ref 0–1)
BUN SERPL-MCNC: 11 MG/DL (ref 7–20)
CALCIUM SERPL-MCNC: 9.4 MG/DL (ref 8.4–10.2)
CHLORIDE SERPL-SCNC: 108 MMOL/L (ref 98–110)
CO2 SERPL-SCNC: 28 MMOL/L (ref 21–32)
CREAT SERPL-MCNC: 0.67 MG/DL (ref 0.66–1.25)
CREAT/UREA NIT SERPL: 16 (ref 12–20)
EOSINOPHIL # BLD AUTO: 0.49 X10(3)/MCL (ref 0.04–0.36)
EOSINOPHIL NFR BLD AUTO: 7.1 % (ref 0.7–7)
ERYTHROCYTE [DISTWIDTH] IN BLOOD BY AUTOMATED COUNT: 13.2 % (ref 11–14.5)
GFR SERPLBLD CREATININE-BSD FMLA CKD-EPI: >90 ML/MIN/1.73/M2
GLOBULIN SER-MCNC: 2.5 GM/DL (ref 2–3.9)
GLUCOSE SERPL-MCNC: 89 MG/DL (ref 70–115)
HCT VFR BLD AUTO: 40.5 % (ref 36–48)
HGB BLD-MCNC: 13.5 G/DL (ref 11.8–16)
IMM GRANULOCYTES # BLD AUTO: 0.01 X10(3)/MCL (ref 0–0.03)
IMM GRANULOCYTES NFR BLD AUTO: 0.1 % (ref 0–0.5)
INR PPP: 1
LYMPHOCYTES # BLD AUTO: 2.11 X10(3)/MCL (ref 1.16–3.74)
LYMPHOCYTES NFR BLD AUTO: 30.6 % (ref 20–55)
MCH RBC QN AUTO: 28.8 PG (ref 27–34)
MCHC RBC AUTO-ENTMCNC: 33.3 G/DL (ref 31–37)
MCV RBC AUTO: 86.5 FL (ref 79–99)
MONOCYTES # BLD AUTO: 0.54 X10(3)/MCL (ref 0.24–0.36)
MONOCYTES NFR BLD AUTO: 7.8 % (ref 4.7–12.5)
NEUTROPHILS # BLD AUTO: 3.69 X10(3)/MCL (ref 1.56–6.13)
NEUTROPHILS NFR BLD AUTO: 53.7 % (ref 37–73)
NRBC BLD AUTO-RTO: 0 %
PLATELET # BLD AUTO: 366 X10(3)/MCL (ref 140–371)
PMV BLD AUTO: 9.3 FL (ref 9.4–12.4)
POTASSIUM SERPL-SCNC: 4.8 MMOL/L (ref 3.5–5.1)
PROT SERPL-MCNC: 7.1 GM/DL (ref 6.3–8.2)
PROTHROMBIN TIME: 10.1 SECONDS (ref 9.3–11.9)
RBC # BLD AUTO: 4.68 X10(6)/MCL (ref 4–5.1)
SODIUM SERPL-SCNC: 139 MMOL/L (ref 136–145)
WBC # BLD AUTO: 6.89 X10(3)/MCL (ref 4–11.5)

## 2025-01-13 PROCEDURE — 85730 THROMBOPLASTIN TIME PARTIAL: CPT | Performed by: SURGERY

## 2025-01-13 PROCEDURE — 85025 COMPLETE CBC W/AUTO DIFF WBC: CPT | Performed by: SURGERY

## 2025-01-13 PROCEDURE — 80053 COMPREHEN METABOLIC PANEL: CPT | Performed by: SURGERY

## 2025-01-13 PROCEDURE — 85610 PROTHROMBIN TIME: CPT | Performed by: SURGERY

## 2025-01-13 RX ORDER — CEFAZOLIN 2 G/1
2 INJECTION, POWDER, FOR SOLUTION INTRAMUSCULAR; INTRAVENOUS
Status: CANCELLED | OUTPATIENT
Start: 2025-01-15

## 2025-01-13 NOTE — DISCHARGE INSTRUCTIONS

## 2025-01-14 ENCOUNTER — ANESTHESIA EVENT (OUTPATIENT)
Dept: SURGERY | Facility: HOSPITAL | Age: 30
End: 2025-01-14
Payer: MEDICAID

## 2025-01-14 NOTE — ANESTHESIA PREPROCEDURE EVALUATION
2025  Jamia Smith is a 29 y.o., female.    Asthma Anxiety disorder, unspecified   H. pylori infection Vitamin D deficiency   Marijuana user      Surgical History     CHOLECYSTECTOMY  SECTION    SECTION      Substance History     Smoking Status: Former   Quit Smokin16   Passive Exposure: Current   Smokeless Tobacco Status: Never   Alcohol use: Not Currently   Drug use: Not Currently     Problem List   Current as of 25 1038  Asthma   Mild depression   Nausea     Pre-op Assessment    I have reviewed the Patient Summary Reports.     I have reviewed the Nursing Notes. I have reviewed the NPO Status.   I have reviewed the Medications.     Review of Systems  Anesthesia Hx:  No problems with previous Anesthesia             Denies Family Hx of Anesthesia complications.    Denies Personal Hx of Anesthesia complications.                    Social:  Former Smoker, Recreational Drugs Marijuana      Hematology/Oncology:    Oncology Normal    -- Anemia:                                  EENT/Dental:  EENT/Dental Normal           Cardiovascular:  Cardiovascular Normal Exercise tolerance: good                                             Pulmonary:    Asthma mild                   Renal/:  Renal/ Normal                 Hepatic/GI:  Hepatic/GI Normal                    Musculoskeletal:  Musculoskeletal Normal                Neurological:  Neurology Normal                                      Endocrine:  Endocrine Normal          Obesity / BMI > 30  Dermatological:  Skin Normal    Psych:  Psychiatric History  depression                Physical Exam  General: Well nourished, Cooperative, Alert and Oriented    Airway:  Mallampati: II / II  Mouth Opening: Normal  TM Distance: Normal  Tongue: Normal  Neck ROM: Normal ROM    Dental:  Intact        Anesthesia Plan  Type of Anesthesia, risks  & benefits discussed:    Anesthesia Type: MAC  Intra-op Monitoring Plan: Standard ASA Monitors  Post Op Pain Control Plan: multimodal analgesia  Induction:  IV  Airway Plan: Direct  Informed Consent: Informed consent signed with the Patient and all parties understand the risks and agree with anesthesia plan.  All questions answered. Patient consented to blood products? Yes  ASA Score: 2  Day of Surgery Review of History & Physical: H&P Update referred to the surgeon/provider.I have interviewed and examined the patient. I have reviewed the patient's H&P dated: There are no significant changes.     Ready For Surgery From Anesthesia Perspective.     .

## 2025-01-15 ENCOUNTER — ANESTHESIA (OUTPATIENT)
Dept: SURGERY | Facility: HOSPITAL | Age: 30
End: 2025-01-15
Payer: MEDICAID

## 2025-01-15 ENCOUNTER — HOSPITAL ENCOUNTER (OUTPATIENT)
Facility: HOSPITAL | Age: 30
Discharge: HOME | End: 2025-01-15
Attending: SURGERY | Admitting: SURGERY
Payer: MEDICAID

## 2025-01-15 DIAGNOSIS — B07.0 PLANTAR WART: ICD-10-CM

## 2025-01-15 DIAGNOSIS — B07.0 PLANTAR WART OF LEFT FOOT: ICD-10-CM

## 2025-01-15 LAB — B-HCG UR QL: NEGATIVE

## 2025-01-15 PROCEDURE — 25000003 PHARM REV CODE 250: Performed by: SURGERY

## 2025-01-15 PROCEDURE — 63600175 PHARM REV CODE 636 W HCPCS: Mod: JZ,TB | Performed by: SURGERY

## 2025-01-15 PROCEDURE — 37000008 HC ANESTHESIA 1ST 15 MINUTES: Performed by: SURGERY

## 2025-01-15 PROCEDURE — 71000015 HC POSTOP RECOV 1ST HR: Performed by: SURGERY

## 2025-01-15 PROCEDURE — 36000704 HC OR TIME LEV I 1ST 15 MIN: Performed by: SURGERY

## 2025-01-15 PROCEDURE — 81025 URINE PREGNANCY TEST: CPT | Performed by: SURGERY

## 2025-01-15 PROCEDURE — 63600175 PHARM REV CODE 636 W HCPCS: Performed by: SURGERY

## 2025-01-15 PROCEDURE — 63600175 PHARM REV CODE 636 W HCPCS: Performed by: NURSE ANESTHETIST, CERTIFIED REGISTERED

## 2025-01-15 PROCEDURE — D9220A PRA ANESTHESIA: Mod: ,,, | Performed by: NURSE ANESTHETIST, CERTIFIED REGISTERED

## 2025-01-15 PROCEDURE — 37000009 HC ANESTHESIA EA ADD 15 MINS: Performed by: SURGERY

## 2025-01-15 PROCEDURE — 36000705 HC OR TIME LEV I EA ADD 15 MIN: Performed by: SURGERY

## 2025-01-15 RX ORDER — PROPOFOL 10 MG/ML
VIAL (ML) INTRAVENOUS
Status: DISCONTINUED | OUTPATIENT
Start: 2025-01-15 | End: 2025-01-15

## 2025-01-15 RX ORDER — ONDANSETRON HYDROCHLORIDE 2 MG/ML
INJECTION, SOLUTION INTRAVENOUS
Status: DISCONTINUED | OUTPATIENT
Start: 2025-01-15 | End: 2025-01-15

## 2025-01-15 RX ORDER — BUPIVACAINE HYDROCHLORIDE 5 MG/ML
INJECTION, SOLUTION EPIDURAL; INTRACAUDAL
Status: DISCONTINUED | OUTPATIENT
Start: 2025-01-15 | End: 2025-01-15 | Stop reason: HOSPADM

## 2025-01-15 RX ORDER — MIDAZOLAM HYDROCHLORIDE 1 MG/ML
2 INJECTION INTRAMUSCULAR; INTRAVENOUS
Status: COMPLETED | OUTPATIENT
Start: 2025-01-15 | End: 2025-01-15

## 2025-01-15 RX ORDER — FAMOTIDINE 20 MG/1
20 TABLET, FILM COATED ORAL
Status: COMPLETED | OUTPATIENT
Start: 2025-01-15 | End: 2025-01-15

## 2025-01-15 RX ORDER — GLYCOPYRROLATE 0.2 MG/ML
0.2 INJECTION INTRAMUSCULAR; INTRAVENOUS
Status: COMPLETED | OUTPATIENT
Start: 2025-01-15 | End: 2025-01-15

## 2025-01-15 RX ORDER — LIDOCAINE HYDROCHLORIDE 20 MG/ML
INJECTION, SOLUTION EPIDURAL; INFILTRATION; INTRACAUDAL; PERINEURAL
Status: DISCONTINUED | OUTPATIENT
Start: 2025-01-15 | End: 2025-01-15

## 2025-01-15 RX ORDER — CEFAZOLIN 2 G/1
2 INJECTION, POWDER, FOR SOLUTION INTRAMUSCULAR; INTRAVENOUS
Status: DISCONTINUED | OUTPATIENT
Start: 2025-01-15 | End: 2025-01-15 | Stop reason: HOSPADM

## 2025-01-15 RX ORDER — KETOROLAC TROMETHAMINE 30 MG/ML
INJECTION, SOLUTION INTRAMUSCULAR; INTRAVENOUS
Status: DISCONTINUED | OUTPATIENT
Start: 2025-01-15 | End: 2025-01-15

## 2025-01-15 RX ORDER — DEXAMETHASONE SODIUM PHOSPHATE 4 MG/ML
INJECTION, SOLUTION INTRA-ARTICULAR; INTRALESIONAL; INTRAMUSCULAR; INTRAVENOUS; SOFT TISSUE
Status: DISCONTINUED | OUTPATIENT
Start: 2025-01-15 | End: 2025-01-15

## 2025-01-15 RX ORDER — FENTANYL CITRATE 50 UG/ML
INJECTION, SOLUTION INTRAMUSCULAR; INTRAVENOUS
Status: DISCONTINUED | OUTPATIENT
Start: 2025-01-15 | End: 2025-01-15

## 2025-01-15 RX ORDER — LIDOCAINE HYDROCHLORIDE 10 MG/ML
INJECTION, SOLUTION INFILTRATION; PERINEURAL
Status: DISCONTINUED | OUTPATIENT
Start: 2025-01-15 | End: 2025-01-15 | Stop reason: HOSPADM

## 2025-01-15 RX ADMIN — CEFAZOLIN 2 G: 2 INJECTION, POWDER, FOR SOLUTION INTRAMUSCULAR; INTRAVENOUS at 02:01

## 2025-01-15 RX ADMIN — PROPOFOL 50 MG: 10 INJECTION, EMULSION INTRAVENOUS at 02:01

## 2025-01-15 RX ADMIN — MIDAZOLAM HYDROCHLORIDE 2 MG: 1 INJECTION, SOLUTION INTRAMUSCULAR; INTRAVENOUS at 01:01

## 2025-01-15 RX ADMIN — KETOROLAC TROMETHAMINE 30 MG: 30 INJECTION, SOLUTION INTRAMUSCULAR; INTRAVENOUS at 02:01

## 2025-01-15 RX ADMIN — FAMOTIDINE 20 MG: 20 TABLET, FILM COATED ORAL at 11:01

## 2025-01-15 RX ADMIN — LIDOCAINE HYDROCHLORIDE 5 ML: 20 INJECTION, SOLUTION EPIDURAL; INFILTRATION; INTRACAUDAL; PERINEURAL at 02:01

## 2025-01-15 RX ADMIN — ONDANSETRON 4 MG: 2 INJECTION INTRAMUSCULAR; INTRAVENOUS at 02:01

## 2025-01-15 RX ADMIN — PROPOFOL 100 MG: 10 INJECTION, EMULSION INTRAVENOUS at 02:01

## 2025-01-15 RX ADMIN — GLYCOPYRROLATE 0.2 MG: 0.2 INJECTION INTRAMUSCULAR; INTRAVENOUS at 11:01

## 2025-01-15 RX ADMIN — FENTANYL CITRATE 100 MCG: 50 INJECTION, SOLUTION INTRAMUSCULAR; INTRAVENOUS at 02:01

## 2025-01-15 RX ADMIN — DEXAMETHASONE SODIUM PHOSPHATE 4 MG: 4 INJECTION, SOLUTION INTRA-ARTICULAR; INTRALESIONAL; INTRAMUSCULAR; INTRAVENOUS; SOFT TISSUE at 02:01

## 2025-01-15 NOTE — ANESTHESIA POSTPROCEDURE EVALUATION
Anesthesia Post Evaluation    Patient: Jamia Smith    Procedure(s) Performed: Procedure(s) (LRB):  EXCISION, WART (Left)    Final Anesthesia Type: MAC      Patient location during evaluation: OPS  Patient participation: Yes- Able to Participate  Level of consciousness: awake and alert and oriented  Post-procedure vital signs: reviewed and stable  Pain management: adequate  Airway patency: patent  MILY mitigation strategies: Multimodal analgesia  PONV status at discharge: No PONV  Anesthetic complications: no      Cardiovascular status: blood pressure returned to baseline and hemodynamically stable  Respiratory status: unassisted, spontaneous ventilation and room air  Hydration status: euvolemic  Follow-up not needed.              Vitals Value Taken Time   BP 99/61 01/15/25 1445   Temp 36.1 °C (96.9 °F) 01/15/25 1445   Pulse 74 01/15/25 1445   Resp 18 01/15/25 1445   SpO2 96 % 01/15/25 1445         No case tracking events are documented in the log.      Pain/Erick Score: No data recorded

## 2025-01-15 NOTE — OP NOTE
[facility]  Operative Note      Date of Procedure: 1/15/2025     Procedure: Procedure(s) (LRB):  EXCISION, WART (Left)   Area of excision was 1 -2 cm  Surgeons and Role:     * Thomas Bennett MD - Primary    Assisting Surgeon: None    Anesthesia Staff Present in Procedure:   No anesthesia staff entered.    OR Staff Present in Procedure:  Circulator: Geraldo Le RN  Scrub Person: Jamia Krishnamurthy ST; Autumn Kumar ST    Pre-Operative Diagnosis: Plantar wart [B07.0]    Post-Operative Diagnosis: Post-Op Diagnosis Codes:     * Plantar wart [B07.0]  EXCISION, WART (Left)   Area of excision was 1 -2 cm  Anesthesia: Local MAC    Operative Findings (including complications, if any):  Patient is a 29-year-old female with a plantar wart that is painful wanted it excised patient underwent shaving and excision of the plantar wart.  Patient had the area of excision 1-2 cm in size.  Patient had Neosporin with Xeroform and then gauze dressings with Kerlix and Ace wrap.  Patient did well had no problems or difficulties was awakened and sent to recovery in good condition        Description of Technical Procedures:  Noted above       Significant Surgical Tasks Conducted by the Assistant(s), if Applicable:  None    Estimated Blood Loss (EBL): * No values recorded between 1/15/2025  2:24 PM and 1/15/2025  2:33 PM *           Implants: * No implants in log *    Specimens:   Specimen (24h ago, onward)      None                    Condition: Good    Disposition: PACU - hemodynamically stable.    Attestation: I was present and scrubbed for the entire procedure.        Discharge Note    OUTCOME: Patient tolerated treatment/procedure well without complication and is now ready for discharge.    DISPOSITION: Home or Self Care    FINAL DIAGNOSIS:    Post-Op Diagnosis Codes:     * Plantar wart [B07.0]    FOLLOWUP: In clinic 1 week    DISCHARGE INSTRUCTIONS:  No discharge procedures on file.

## 2025-01-15 NOTE — PLAN OF CARE
PT IS BACK TO ROOM, AWAKE AND ALERT, IN STABLE CONDITION, NO DIFFICULTY BREATHING OR SWALLOWING. PT IS TOLERATING LIQUIDS, FAMILY AT SIDE, SR X2, CB IN REACH.

## 2025-01-15 NOTE — DISCHARGE INSTRUCTIONS
Remove bandage in 48 hours, clean with antibacterial soap and water, dry thoroughly, replace dressing. Do this daily until follow up appt.    Clean daily.    Notify your MD:  Any signs of infection - fever above 100.4, yellow/green drainage from the site.  Swelling at site.  Excessive bleeding from site.  Excessive nausea/vomiting.  Difficulty breathing or swallowing.

## 2025-01-15 NOTE — DISCHARGE SUMMARY
[facility]  Discharge Note  Short Stay    Procedure(s) (LRB):  EXCISION, WART (Left)    Present during examination : ST Mendez      OUTCOME: Patient tolerated treatment/procedure well without complication and is now ready for discharge.    DISPOSITION: Home or Self Care    FINAL DIAGNOSIS:       * Plantar wart [B07.0]  EXCISION, WART (Left)   Area of excision was 1 -2 cm  FOLLOWUP: In clinic 1 week    DISCHARGE INSTRUCTIONS:  No discharge procedures on file.     TIME SPENT ON DISCHARGE:  5 minutes

## 2025-01-16 VITALS
HEART RATE: 70 BPM | BODY MASS INDEX: 20.66 KG/M2 | TEMPERATURE: 97 F | RESPIRATION RATE: 20 BRPM | DIASTOLIC BLOOD PRESSURE: 58 MMHG | WEIGHT: 120.38 LBS | OXYGEN SATURATION: 98 % | SYSTOLIC BLOOD PRESSURE: 110 MMHG

## 2025-01-27 LAB — BEAKER SEE SCANNED REPORT: NORMAL

## 2025-03-27 ENCOUNTER — HOSPITAL ENCOUNTER (EMERGENCY)
Facility: HOSPITAL | Age: 30
Discharge: HOME | End: 2025-03-27
Attending: EMERGENCY MEDICINE
Payer: MEDICAID

## 2025-03-27 VITALS
OXYGEN SATURATION: 99 % | BODY MASS INDEX: 21.39 KG/M2 | RESPIRATION RATE: 19 BRPM | TEMPERATURE: 98 F | SYSTOLIC BLOOD PRESSURE: 117 MMHG | DIASTOLIC BLOOD PRESSURE: 65 MMHG | HEART RATE: 68 BPM | WEIGHT: 124.63 LBS

## 2025-03-27 DIAGNOSIS — L02.91 ABSCESS: Primary | ICD-10-CM

## 2025-03-27 DIAGNOSIS — L03.90 CELLULITIS, UNSPECIFIED CELLULITIS SITE: ICD-10-CM

## 2025-03-27 LAB — B-HCG UR QL: NEGATIVE

## 2025-03-27 PROCEDURE — 81025 URINE PREGNANCY TEST: CPT | Performed by: PHYSICIAN ASSISTANT

## 2025-03-27 PROCEDURE — 99283 EMERGENCY DEPT VISIT LOW MDM: CPT

## 2025-03-27 RX ORDER — DOXYCYCLINE 100 MG/1
100 CAPSULE ORAL 2 TIMES DAILY
Qty: 20 CAPSULE | Refills: 0 | Status: SHIPPED | OUTPATIENT
Start: 2025-03-27 | End: 2025-04-06

## 2025-03-27 RX ORDER — IBUPROFEN 800 MG/1
800 TABLET ORAL 3 TIMES DAILY
Qty: 30 TABLET | Refills: 0 | Status: SHIPPED | OUTPATIENT
Start: 2025-03-27

## 2025-03-27 NOTE — Clinical Note
"Jamia"Yuliana Smith was seen and treated in our emergency department on 3/27/2025.  She may return to work on 03/31/2025.  Please excuse for up to 48hours prior for symptoms present at that time if possible. Patient may also return sooner than above date if symptoms improve/resolve.       If you have any questions or concerns, please don't hesitate to call.      Criselda Tao PA"

## 2025-03-28 NOTE — DISCHARGE INSTRUCTIONS
"Apply  warm compresses to encourage infection to come out. Otherwise use ice as needed for pain relief and swelling. leave dressing clean, dry, and intact for 24-48 hours. then remove dressing. shower, wash/shower with antibacterial (like dial), non-scented soap, DO NOT SOAK IN TUB or DO NOT SOAK BODY PART, clean and pat affected area with a DIFFERENT cloth than you dried the rest of your body with. Apply ointment and cover as needed. Keep clean dry and intact. Change dressing DAILY. GO TO EMERGENCY ROOM IF ANY SIGNS of INFECTION- fever >101, redness, oozing, or "concerning" incision.    "

## 2025-03-28 NOTE — ED PROVIDER NOTES
Encounter Date: 3/27/2025       History     Chief Complaint   Patient presents with    Abscess     Abscess to left upper thigh x2 weeks, pain and swelling increasing tonight.      See MDM for details.      The history is provided by the patient. No  was used.     Review of patient's allergies indicates:   Allergen Reactions    Iodinated contrast media Shortness Of Breath    Iodine Rash and Swelling    Shrimp      Past Medical History:   Diagnosis Date    Anxiety disorder, unspecified     not currently    Asthma     Dormant    H. pylori infection     Marijuana user 2023    Vitamin D deficiency      Past Surgical History:   Procedure Laterality Date     SECTION N/A 2023    Procedure:  SECTION;  Surgeon: Davide Hood MD;  Location: [facility] OR;  Service: OB/GYN;  Laterality: N/A;     SECTION N/A 2024    Procedure:  SECTION;  Surgeon: Davide Hood MD;  Location: [facility] OR;  Service: OB/GYN;  Laterality: N/A;    CHOLECYSTECTOMY      EXCISION OF VERRUCA Left 1/15/2025    Procedure: EXCISION, WART;  Surgeon: Thomas Bennett MD;  Location: [facility] OR;  Service: General;  Laterality: Left;  Area of excision was 1 -2 cm     Family History   Problem Relation Name Age of Onset    No Known Problems Paternal Grandfather      No Known Problems Paternal Grandmother      No Known Problems Maternal Grandmother      No Known Problems Maternal Grandfather      No Known Problems Father      No Known Problems Mother      No Known Problems Brother      No Known Problems Sister      Breast cancer Neg Hx      Cervical cancer Neg Hx      Uterine cancer Neg Hx      Ovarian cancer Neg Hx      Colon cancer Neg Hx       Social History[1]  Review of Systems   Constitutional: Negative.    HENT: Negative.     Eyes: Negative.    Respiratory: Negative.     Cardiovascular: Negative.    Gastrointestinal: Negative.    Genitourinary: Negative.    Musculoskeletal: Negative.   "  Skin:  Positive for wound.   Neurological: Negative.        Physical Exam     Initial Vitals [03/27/25 1936]   BP Pulse Resp Temp SpO2   116/62 67 18 97.9 °F (36.6 °C) 99 %      MAP       --         Physical Exam    Nursing note and vitals reviewed.  Constitutional: She appears well-developed and well-nourished. No distress.   HENT:   Head: Normocephalic and atraumatic.   Eyes: Conjunctivae, EOM and lids are normal. Pupils are equal, round, and reactive to light.   Neck: Neck supple.   Normal range of motion.  Cardiovascular:  Normal rate and regular rhythm.           Pulmonary/Chest: Effort normal and breath sounds normal.   Abdominal: Abdomen is flat.   Musculoskeletal:      Cervical back: Normal range of motion and neck supple.     Neurological: She is alert and oriented to person, place, and time. She has normal strength. She is not disoriented. No cranial nerve deficit or sensory deficit. GCS eye subscore is 4. GCS verbal subscore is 5. GCS motor subscore is 6.   Skin: Skin is warm and intact. Abscess noted.   Psychiatric: She has a normal mood and affect. Her speech is normal and behavior is normal. Judgment and thought content normal. Cognition and memory are normal.         ED Course   Procedures  Labs Reviewed   PREGNANCY TEST, URINE RAPID - Normal       Result Value    hCG Qualitative, Urine Negative            Imaging Results    None          Medications - No data to display  Medical Decision Making  29yoWF w/hx of anxiety, asthma, marijuana use presents to the ER with abscess to the left upper thigh x3 -4days.  Patient has been trying to "pop" the area without any improvement.  No drainage. All questions asked and answered at the time of the visit. Strict ER precautions given. Patient and family members agreeable to plan.       Problems Addressed:  Abscess: acute illness or injury     Details: Differential diagnosis included but not limited to:  Abscess, cellulitis, folliculitis    Counseled her to not " attempt to express it at home.  No fluctuance at this time.  Encourage warm compresses.  Patient will continue to monitor and take antibiotics as prescribed. All questions asked and answered at the time of the visit. Strict ER precautions given. Patient and family members agreeable to plan.       Amount and/or Complexity of Data Reviewed  Labs:  Decision-making details documented in ED Course.    Risk  Prescription drug management.               ED Course as of 03/27/25 2003   Thu Mar 27, 2025   2002 hCG Qualitative, Urine: Negative [ST]      ED Course User Index  [ST] Criselda Tao PA                           Clinical Impression:  Final diagnoses:  [L02.91] Abscess (Primary)  [L03.90] Cellulitis, unspecified cellulitis site          ED Disposition Condition    Discharge Stable          ED Prescriptions       Medication Sig Dispense Start Date End Date Auth. Provider    doxycycline (VIBRAMYCIN) 100 MG Cap Take 1 capsule (100 mg total) by mouth 2 (two) times daily. for 10 days 20 capsule 3/27/2025 4/6/2025 Criselda Tao PA    ibuprofen (ADVIL,MOTRIN) 800 MG tablet Take 1 tablet (800 mg total) by mouth 3 (three) times daily. 30 tablet 3/27/2025 -- Criselda Tao PA          Follow-up Information       Follow up With Specialties Details Why Contact Info    Yann Norris APRN Family Medicine Schedule an appointment as soon as possible for a visit   [address]  [address]  [facility] [id number] [phone]      [facility] Emergency Medicine  As needed, If symptoms worsen [address] [address] [phone]        This note was typed partially using voice recognition software.  Please be reminded that not all corrections/addendums to grammar may have been made prior to closing of this chart.         [1]   Social History  Tobacco Use    Smoking status: Former     Types: Cigarettes     Start date: 4/18/2024     Quit  "date: 2016     Years since quittin.2     Passive exposure: Current    Smokeless tobacco: Never    Tobacco comments:     3/21/23 per pt "I smoke  only 1-3 cigarettes a day."     Recently quit   Substance Use Topics    Alcohol use: Not Currently    Drug use: Not Currently     Types: Marijuana        Criselda Tao PA  27    "

## 2025-04-07 NOTE — PROGRESS NOTES
"  Chief Complaint     UPT confirmation    HPI:     Patient is a 29 y.o.  presents today for UPT confirmation. Unsure of exact LMP, believes to have been in February.  She does occasionally smoke marijuana.  Also reports occasional nausea.      Gyn History:    Menstrual History  Cycle: Yes (LMP: "sometime in February")  Menarche Age: 15 years  Flow Duration: 5  Flow: Normal  Interval: 28  Intermenstrual Bleeding: No  Dysmenorrhea: No    Menopause  Menopause Age: 2 years    Pap History  Last pap date: 02/15/22  Result: Normal  History of Abnormal Pap: No  HPV Vaccine Completed: Yes  HPV Vaccine Injection Type: 3 Injection Series    [place]  Sexually Active: Yes  Postcoital Bleeding: No  Dyspareunia: No  STI History: No  Contraception: No    Breast History  Last Breast Imaging Date: No  History of Breast Biopsy: No          Past Medical History:   Diagnosis Date    Anxiety disorder, unspecified     not currently    Asthma     Dormant    H. pylori infection     Marijuana user 2023    Vitamin D deficiency        Past Surgical History:   Procedure Laterality Date     SECTION N/A 2023    Procedure:  SECTION;  Surgeon: Davide Hood MD;  Location: [facility] OR;  Service: OB/GYN;  Laterality: N/A;     SECTION N/A 2024    Procedure:  SECTION;  Surgeon: Davide Hood MD;  Location: [facility] OR;  Service: OB/GYN;  Laterality: N/A;    CHOLECYSTECTOMY      EXCISION OF VERRUCA Left 1/15/2025    Procedure: EXCISION, WART;  Surgeon: Thomas Bennett MD;  Location: [facility] OR;  Service: General;  Laterality: Left;  Area of excision was 1 -2 cm       Family History   Problem Relation Name Age of Onset    No Known Problems Paternal Grandfather      No Known Problems Paternal Grandmother      No Known Problems Maternal Grandmother      No Known Problems Maternal Grandfather      No Known Problems Father      No Known Problems Mother      No Known Problems Brother      No " Known Problems Sister      Breast cancer Neg Hx      Cervical cancer Neg Hx      Uterine cancer Neg Hx      Ovarian cancer Neg Hx      Colon cancer Neg Hx         OB History          2    Para   2    Term   2            AB        Living   2         SAB        IAB        Ectopic        Multiple   0    Live Births   2                 Medications Ordered Prior to Encounter[1]    Review of Systems:       Review of Systems   Constitutional:  Negative for chills and fever.   Gastrointestinal:  Negative for abdominal pain, constipation and diarrhea.   Genitourinary:  Negative for bladder incontinence, decreased libido, dysmenorrhea, dyspareunia, dysuria, flank pain, frequency, genital sores, hematuria, hot flashes, menorrhagia, menstrual problem, pelvic pain, urgency, vaginal bleeding, vaginal discharge, vaginal pain, urinary incontinence, postcoital bleeding, postmenopausal bleeding, vaginal dryness and vaginal odor.        Physical Exam:    /70 (Patient Position: Sitting)   Wt 56.2 kg (124 lb)   Breastfeeding No   BMI 21.28 kg/m²     Physical Exam   General Exam:    General Appearance: alert, in no acute distress, normal, well nourished.  Psych:  Orientation: time, place, person.  Mood/Affect: Normal       Assessment:   1. Missed menses  -     POCT urine pregnancy    2. Positive pregnancy test    3. Marijuana use during pregnancy    4. Nausea and vomiting during pregnancy             Plan:       Positive UPT today in clinic  Prenatal counseling  Tobacco avoidance/cessation  Discussed marijuana use in pregnancy and potential risk.  Recommend cessation.  Illicit drug avoidance  PNV, folic acid     B6/Unisom prn N/V    RTC New OB       This note was transcribed by Obdulia Del Angel. There may be transcription errors as a result, however minimal. Effort has been made to ensure accuracy of transcription, but any obvious errors or omissions should be clarified with the author of the document.       I agree  with the above documentation.              [1]   Current Outpatient Medications on File Prior to Visit   Medication Sig Dispense Refill    albuterol (PROVENTIL/VENTOLIN HFA) 90 mcg/actuation inhaler Inhale 1-2 puffs into the lungs every 6 (six) hours as needed for Wheezing. Rescue (Patient not taking: Reported on 4/10/2025) 6.7 g 1    ibuprofen (ADVIL,MOTRIN) 800 MG tablet Take 1 tablet (800 mg total) by mouth 3 (three) times daily. (Patient not taking: Reported on 4/10/2025) 30 tablet 0     No current facility-administered medications on file prior to visit.

## 2025-04-10 ENCOUNTER — OFFICE VISIT (OUTPATIENT)
Dept: OBSTETRICS AND GYNECOLOGY | Facility: CLINIC | Age: 30
End: 2025-04-10
Payer: MEDICAID

## 2025-04-10 VITALS — DIASTOLIC BLOOD PRESSURE: 70 MMHG | WEIGHT: 124 LBS | BODY MASS INDEX: 21.28 KG/M2 | SYSTOLIC BLOOD PRESSURE: 110 MMHG

## 2025-04-10 DIAGNOSIS — O21.9 NAUSEA AND VOMITING DURING PREGNANCY: ICD-10-CM

## 2025-04-10 DIAGNOSIS — O99.320 MARIJUANA USE DURING PREGNANCY: ICD-10-CM

## 2025-04-10 DIAGNOSIS — N92.6 MISSED MENSES: Primary | ICD-10-CM

## 2025-04-10 DIAGNOSIS — F12.90 MARIJUANA USE DURING PREGNANCY: ICD-10-CM

## 2025-04-10 DIAGNOSIS — Z32.01 POSITIVE PREGNANCY TEST: ICD-10-CM

## 2025-04-10 LAB
B-HCG UR QL: POSITIVE
CTP QC/QA: YES

## 2025-04-11 NOTE — PROGRESS NOTES
"Chief Complaint:  Initial Prenatal Visit    History of Present Illness:  Jamia Smith is a 29 y.o. year old  presents for her new ob, patient reports last menstrual period sometime in mid February.    Denies hx of genetic disorders for self, FOB  Gyn History:    Menstrual History  Cycle: Yes (LMP "sometimes in Feb.")  Menarche Age: 15 years  Flow Duration: 5  Flow: Normal  Interval: 28  Intermenstrual Bleeding: No  Dysmenorrhea: No    Menopause  Menopause Age: 2 years    Pap History  Last pap date: 02/15/22  Result: Normal  History of Abnormal Pap: No  HPV Vaccine Completed: Yes  HPV Vaccine Injection Type: 3 Injection Series    [place]  Sexually Active: Yes  Sexual Orientation: heterosexual  Postcoital Bleeding: No  Dyspareunia: No  STI History: No  Contraception: No    Breast History  Last Breast Imaging Date: No  History of Breast Biopsy: No        Review of Systems:  General/Constitutional: Chills denies. Fatigue/weakness denies. Fever denies. Night sweats denies. Hot flashes denies.   Respiratory: Cough denies. Hemoptysis denies. SOB denies. Sputum production denies. Wheezing denies.   Cardiovascular: Chest pain denies. Dizziness denies. Palpitations denies. Swelling in hands/feet denies.    Gastrointestinal: Abdominal pain denies. Blood in stool denies. Constipation denies. Diarrhea denies. Heartburn denies. Nausea denies. Vomiting denies.   Genitourinary: Incontinence denies. Blood in urine denies. Frequent urination denies. Painful urination denies.  Urinary urgency denies.  Nocturia denies.   Gynecologic: Irregular menses denies. Heavy bleeding denies. Painful menses denies. Vaginal discharge denies. Vaginal odor denies. Vaginal itching denies. Vaginal lesion denies.  Pelvic pain denies. Decreased libido denies. Vulvar lesion denies. Prolapse of genital organs denies. Painful intercourse denies. Postcoital bleeding denies.   Psychiatric: Depression denies. Anxiety denies.     OB History "    Para Term  AB Living   2 2 2 0 0 2   SAB IAB Ectopic Multiple Live Births   0 0 0 0 2      # 1 - Date: 23, Sex: Male, Weight: 3.186 kg (7 lb 0.4 oz), GA: 39w1d, Type: , Low Transverse, Apgar1: 9, Apgar5: 9, Living: Living, Birth Comments: None    # 2 - Date: 24, Sex: Male, Weight: 3.057 kg (6 lb 11.8 oz), GA: 38w1d, Type: , Low Transverse, Apgar1: 8, Apgar5: 9, Living: Living, Birth Comments: None        Past Medical History:   Diagnosis Date    Anxiety disorder, unspecified     not currently    Asthma     Dormant    H. pylori infection     Marijuana user 2023    Vitamin D deficiency      Current Medications[1]    Review of patient's allergies indicates:   Allergen Reactions    Iodinated contrast media Shortness Of Breath    Iodine Rash and Swelling    Shrimp        Past Surgical History:   Procedure Laterality Date     SECTION N/A 2023    Procedure:  SECTION;  Surgeon: Davide Hood MD;  Location: [facility] OR;  Service: OB/GYN;  Laterality: N/A;     SECTION N/A 2024    Procedure:  SECTION;  Surgeon: Davide Hood MD;  Location: [facility] OR;  Service: OB/GYN;  Laterality: N/A;    CHOLECYSTECTOMY      EXCISION OF VERRUCA Left 1/15/2025    Procedure: EXCISION, WART;  Surgeon: Thomas Bennett MD;  Location: [facility] OR;  Service: General;  Laterality: Left;  Area of excision was 1 -2 cm     Family History   Problem Relation Name Age of Onset    No Known Problems Paternal Grandfather      No Known Problems Paternal Grandmother      No Known Problems Maternal Grandmother      No Known Problems Maternal Grandfather      No Known Problems Father      No Known Problems Mother      No Known Problems Brother      No Known Problems Sister      Breast cancer Neg Hx      Cervical cancer Neg Hx      Uterine cancer Neg Hx      Ovarian cancer Neg Hx      Colon cancer Neg Hx       Social History[2]    Physical Exam:  There were no  vitals taken for this visit.    Chaperone: present.       General appearance: healthy, well-nourished and well-developed     Psychiatric: Orientation to time, place and person. Normal mood and affect and active, alert     Skin:   Appearance: no rashes or lesions.     Neck:   Neck: supple, FROM, trachea midline. and no masses   Thyroid: no enlargement or nodules and non-tender.       Cardiovascular:   Auscultation: RRR and no murmur.   Peripheral Vascular: no varicosities, LLE edema, RLE edema, calf tenderness, and palpable cord and pedal pulses intact.     Lungs:   Respiratory effort: no intercostal retractions or accessory muscle usage.   Auscultation: no wheezing, rales/crackles, or rhonchi and clear to auscultation.     Breast:   Inspection/Palpation: no tenderness, discrete/distinct masses, skin changes, or abnormal secretions. Nipple appearance normal.     Abdomen:   Auscultation/Inspection/Palpation: no hepatomegaly, splenomegaly, masses, tenderness or CVA tenderness and soft, non-distended bowel sounds preset.    Hernia: no palpable hernias.     Female Genitalia:   Vulva: no masses, tenderness or lesions   Bladder/Urethra: no urethral discharge or mass, normal meatus, bladder non-distended.   Vagina: no tenderness, erythema, cystocele, rectocele, abnormal vaginal discharge or vesicle(s) or ulcers   Cervix: no discharge, no cervical lacerations noted or motion tenderness and grossly normal   Uterus: normal size and shape and midline, non-tender, and no uterine prolapse.   Adnexa/Parametria: no parametrial tenderness or mass, no adnexal tenderness or ovarian mass.     Lymph Nodes:   Palpation: non tender submandibular nodes, axillary nodes, or inguinal nodes.     Rectal Exam:   Rectum: normal perianal skin.     TVUS:    IUP with GS: 4w5d  YS present  No FP or CRL  Small free pelvic fluid  Normal right ovary  Left ovary not identified.      Assessment/Plan:  1. Marijuana use during pregnancy    2. Positive  "pregnancy test  -     C.trach/N.gonor AMP RNA  -     Trichomonas vaginalis, RNA, Qual  -     POCT Urinalysis, Dipstick, Automated, W/O Scope  -     Urine Culture High Risk  -     US OB/GYN Procedure ([facility]) - Extended List    3. Early stage of pregnancy           Prenatal counseling  Discussed appropriate weight gain for pregnancy  Tobacco avoidance/cessation  Illicit drug avoidance  PNL  PNV, folic acid     Pap done  GC/CZ/TV     Breastfeeding education given     Early IUP noted.    Recommend rescan in 2 weeks   Bleeding pain precautions given  Discussed marijuana use.  Recommend cessation immediately.    RTC 2 weeks      This note was transcribed by Obdulia Del Angel. There may be transcription errors as a result, however minimal. Effort has been made to ensure accuracy of transcription, but any obvious errors or omissions should be clarified with the author of the document.       I agree with the above documentation.                [1]   Current Outpatient Medications:     albuterol (PROVENTIL/VENTOLIN HFA) 90 mcg/actuation inhaler, Inhale 1-2 puffs into the lungs every 6 (six) hours as needed for Wheezing. Rescue (Patient not taking: Reported on 4/10/2025), Disp: 6.7 g, Rfl: 1    ibuprofen (ADVIL,MOTRIN) 800 MG tablet, Take 1 tablet (800 mg total) by mouth 3 (three) times daily. (Patient not taking: Reported on 4/10/2025), Disp: 30 tablet, Rfl: 0  [2]   Social History  Socioeconomic History    Marital status: Significant Other   Tobacco Use    Smoking status: Former     Types: Cigarettes     Start date: 2024     Quit date: 2016     Years since quittin.2     Passive exposure: Current    Smokeless tobacco: Never    Tobacco comments:     3/21/23 per pt "I smoke  only 1-3 cigarettes a day."     Recently quit   Substance and Sexual Activity    Alcohol use: Not Currently    Drug use: Not Currently     Types: Marijuana    Sexual activity: Yes     Partners: Male     Birth control/protection: None     Social " Drivers of Health     Financial Resource Strain: Low Risk  (5/22/2024)    Overall Financial Resource Strain (CARDIA)     Difficulty of Paying Living Expenses: Not hard at all   Food Insecurity: No Food Insecurity (5/22/2024)    Hunger Vital Sign     Worried About Running Out of Food in the Last Year: Never true     Ran Out of Food in the Last Year: Never true   Transportation Needs: No Transportation Needs (5/22/2024)    TRANSPORTATION NEEDS     Transportation : No   Physical Activity: Inactive (5/22/2024)    Exercise Vital Sign     Days of Exercise per Week: 0 days     Minutes of Exercise per Session: 0 min   Stress: No Stress Concern Present (5/22/2024)    [employer] - Occupational Stress Questionnaire     Feeling of Stress : Not at all   Housing Stability: Unknown (5/22/2024)    Housing Stability Vital Sign     Unable to Pay for Housing in the Last Year: No     Homeless in the Last Year: No

## 2025-04-14 ENCOUNTER — INITIAL PRENATAL (OUTPATIENT)
Dept: OBSTETRICS AND GYNECOLOGY | Facility: CLINIC | Age: 30
End: 2025-04-14
Payer: MEDICAID

## 2025-04-14 VITALS — WEIGHT: 127 LBS | BODY MASS INDEX: 21.8 KG/M2 | SYSTOLIC BLOOD PRESSURE: 122 MMHG | DIASTOLIC BLOOD PRESSURE: 64 MMHG

## 2025-04-14 DIAGNOSIS — Z34.90 EARLY STAGE OF PREGNANCY: ICD-10-CM

## 2025-04-14 DIAGNOSIS — F12.90 MARIJUANA USE DURING PREGNANCY: Primary | ICD-10-CM

## 2025-04-14 DIAGNOSIS — O99.320 MARIJUANA USE DURING PREGNANCY: Primary | ICD-10-CM

## 2025-04-14 DIAGNOSIS — Z12.4 CERVICAL CANCER SCREENING: ICD-10-CM

## 2025-04-14 DIAGNOSIS — Z01.419 ENCOUNTER FOR ANNUAL ROUTINE GYNECOLOGICAL EXAMINATION: ICD-10-CM

## 2025-04-14 DIAGNOSIS — Z32.01 POSITIVE PREGNANCY TEST: ICD-10-CM

## 2025-04-14 LAB
BILIRUB UR QL STRIP: NEGATIVE
GLUCOSE UR QL STRIP: NEGATIVE
KETONES UR QL STRIP: NEGATIVE
LEUKOCYTE ESTERASE UR QL STRIP: POSITIVE
PH, POC UA: 6
POC BLOOD, URINE: NEGATIVE
POC NITRATES, URINE: NEGATIVE
PROT UR QL STRIP: NEGATIVE
SP GR UR STRIP: 1.03 (ref 1–1.03)
UROBILINOGEN UR STRIP-ACNC: 0.2 (ref 0.1–1.1)

## 2025-04-14 PROCEDURE — 87086 URINE CULTURE/COLONY COUNT: CPT | Performed by: OBSTETRICS & GYNECOLOGY

## 2025-04-16 LAB — BACTERIA UR CULT: NO GROWTH

## 2025-04-20 ENCOUNTER — HOSPITAL ENCOUNTER (EMERGENCY)
Facility: HOSPITAL | Age: 30
Discharge: HOME | End: 2025-04-20
Attending: FAMILY MEDICINE
Payer: MEDICAID

## 2025-04-20 VITALS
RESPIRATION RATE: 18 BRPM | DIASTOLIC BLOOD PRESSURE: 70 MMHG | HEART RATE: 65 BPM | SYSTOLIC BLOOD PRESSURE: 107 MMHG | TEMPERATURE: 98 F | OXYGEN SATURATION: 98 %

## 2025-04-20 DIAGNOSIS — E83.42 HYPOMAGNESEMIA: ICD-10-CM

## 2025-04-20 DIAGNOSIS — R10.9 ACUTE LEFT FLANK PAIN: Primary | ICD-10-CM

## 2025-04-20 DIAGNOSIS — Z34.91 NORMAL IUP (INTRAUTERINE PREGNANCY) ON PRENATAL ULTRASOUND, FIRST TRIMESTER: ICD-10-CM

## 2025-04-20 DIAGNOSIS — R10.9 LEFT FLANK PAIN: ICD-10-CM

## 2025-04-20 DIAGNOSIS — R10.32 LLQ ABDOMINAL PAIN: ICD-10-CM

## 2025-04-20 LAB
ALBUMIN SERPL-MCNC: 4 G/DL (ref 3.4–5)
ALBUMIN/GLOB SERPL: 1.7 RATIO
ALP SERPL-CCNC: 58 UNIT/L (ref 50–144)
ALT SERPL-CCNC: 11 UNIT/L (ref 1–45)
AMYLASE SERPL-CCNC: 45 UNIT/L (ref 30–100)
ANION GAP SERPL CALC-SCNC: 7 MEQ/L (ref 2–13)
AST SERPL-CCNC: 19 UNIT/L (ref 14–36)
B-HCG FREE SERPL-ACNC: NORMAL MIU/ML
BASOPHILS # BLD AUTO: 0.07 X10(3)/MCL (ref 0.01–0.08)
BASOPHILS NFR BLD AUTO: 1 % (ref 0.1–1.2)
BILIRUB SERPL-MCNC: 1.1 MG/DL (ref 0–1)
BILIRUB UR QL STRIP.AUTO: ABNORMAL
BUN SERPL-MCNC: 9 MG/DL (ref 7–20)
CALCIUM SERPL-MCNC: 8.7 MG/DL (ref 8.4–10.2)
CHLORIDE SERPL-SCNC: 106 MMOL/L (ref 98–110)
CLARITY UR: CLEAR
CO2 SERPL-SCNC: 24 MMOL/L (ref 21–32)
COLOR UR AUTO: YELLOW
CREAT SERPL-MCNC: 0.63 MG/DL (ref 0.66–1.25)
CREAT/UREA NIT SERPL: 14 (ref 12–20)
EOSINOPHIL # BLD AUTO: 0.31 X10(3)/MCL (ref 0.04–0.36)
EOSINOPHIL NFR BLD AUTO: 4.3 % (ref 0.7–7)
ERYTHROCYTE [DISTWIDTH] IN BLOOD BY AUTOMATED COUNT: 12.6 % (ref 11–14.5)
GFR SERPLBLD CREATININE-BSD FMLA CKD-EPI: >90 ML/MIN/1.73/M2
GLOBULIN SER-MCNC: 2.4 GM/DL (ref 2–3.9)
GLUCOSE SERPL-MCNC: 88 MG/DL (ref 70–115)
GLUCOSE UR QL STRIP: NEGATIVE
HCT VFR BLD AUTO: 37.3 % (ref 36–48)
HGB BLD-MCNC: 12.4 G/DL (ref 11.8–16)
HGB UR QL STRIP: NEGATIVE
IMM GRANULOCYTES # BLD AUTO: 0.02 X10(3)/MCL (ref 0–0.03)
IMM GRANULOCYTES NFR BLD AUTO: 0.3 % (ref 0–0.5)
KETONES UR QL STRIP: 40
LEUKOCYTE ESTERASE UR QL STRIP: NEGATIVE
LIPASE SERPL-CCNC: 49 U/L (ref 23–300)
LYMPHOCYTES # BLD AUTO: 1.87 X10(3)/MCL (ref 1.16–3.74)
LYMPHOCYTES NFR BLD AUTO: 25.8 % (ref 20–55)
MAGNESIUM SERPL-MCNC: 1.7 MG/DL (ref 1.8–2.4)
MCH RBC QN AUTO: 28.5 PG (ref 27–34)
MCHC RBC AUTO-ENTMCNC: 33.2 G/DL (ref 31–37)
MCV RBC AUTO: 85.7 FL (ref 79–99)
MONOCYTES # BLD AUTO: 0.54 X10(3)/MCL (ref 0.24–0.36)
MONOCYTES NFR BLD AUTO: 7.4 % (ref 4.7–12.5)
NEUTROPHILS # BLD AUTO: 4.45 X10(3)/MCL (ref 1.56–6.13)
NEUTROPHILS NFR BLD AUTO: 61.2 % (ref 37–73)
NITRITE UR QL STRIP: NEGATIVE
NRBC BLD AUTO-RTO: 0 %
PH UR STRIP: 6.5 [PH]
PLATELET # BLD AUTO: 318 X10(3)/MCL (ref 140–371)
PMV BLD AUTO: 9.4 FL (ref 9.4–12.4)
POTASSIUM SERPL-SCNC: 3.4 MMOL/L (ref 3.5–5.1)
PROT SERPL-MCNC: 6.4 GM/DL (ref 6.3–8.2)
PROT UR QL STRIP: NEGATIVE
RBC # BLD AUTO: 4.35 X10(6)/MCL (ref 4–5.1)
SODIUM SERPL-SCNC: 137 MMOL/L (ref 136–145)
SP GR UR STRIP.AUTO: 1.02 (ref 1–1.03)
UROBILINOGEN UR STRIP-ACNC: 2
WBC # BLD AUTO: 7.26 X10(3)/MCL (ref 4–11.5)

## 2025-04-20 PROCEDURE — 25000003 PHARM REV CODE 250: Performed by: OTOLARYNGOLOGY

## 2025-04-20 PROCEDURE — 96375 TX/PRO/DX INJ NEW DRUG ADDON: CPT

## 2025-04-20 PROCEDURE — 96374 THER/PROPH/DIAG INJ IV PUSH: CPT

## 2025-04-20 PROCEDURE — 80053 COMPREHEN METABOLIC PANEL: CPT | Performed by: FAMILY MEDICINE

## 2025-04-20 PROCEDURE — 85025 COMPLETE CBC W/AUTO DIFF WBC: CPT | Performed by: FAMILY MEDICINE

## 2025-04-20 PROCEDURE — 84702 CHORIONIC GONADOTROPIN TEST: CPT | Performed by: FAMILY MEDICINE

## 2025-04-20 PROCEDURE — 83735 ASSAY OF MAGNESIUM: CPT | Performed by: FAMILY MEDICINE

## 2025-04-20 PROCEDURE — 83690 ASSAY OF LIPASE: CPT | Performed by: FAMILY MEDICINE

## 2025-04-20 PROCEDURE — 63600175 PHARM REV CODE 636 W HCPCS: Performed by: FAMILY MEDICINE

## 2025-04-20 PROCEDURE — 25000003 PHARM REV CODE 250: Performed by: FAMILY MEDICINE

## 2025-04-20 PROCEDURE — 96361 HYDRATE IV INFUSION ADD-ON: CPT

## 2025-04-20 PROCEDURE — 99285 EMERGENCY DEPT VISIT HI MDM: CPT | Mod: 25

## 2025-04-20 PROCEDURE — 82150 ASSAY OF AMYLASE: CPT | Performed by: FAMILY MEDICINE

## 2025-04-20 PROCEDURE — 81003 URINALYSIS AUTO W/O SCOPE: CPT | Performed by: FAMILY MEDICINE

## 2025-04-20 RX ORDER — ONDANSETRON HYDROCHLORIDE 2 MG/ML
4 INJECTION, SOLUTION INTRAVENOUS
Status: COMPLETED | OUTPATIENT
Start: 2025-04-20 | End: 2025-04-20

## 2025-04-20 RX ORDER — POTASSIUM CHLORIDE 20 MEQ/1
40 TABLET, EXTENDED RELEASE ORAL
Status: COMPLETED | OUTPATIENT
Start: 2025-04-20 | End: 2025-04-20

## 2025-04-20 RX ORDER — MORPHINE SULFATE 4 MG/ML
4 INJECTION, SOLUTION INTRAMUSCULAR; INTRAVENOUS
Refills: 0 | Status: COMPLETED | OUTPATIENT
Start: 2025-04-20 | End: 2025-04-20

## 2025-04-20 RX ORDER — MAGNESIUM SULFATE HEPTAHYDRATE 40 MG/ML
2 INJECTION, SOLUTION INTRAVENOUS ONCE
Status: DISCONTINUED | OUTPATIENT
Start: 2025-04-20 | End: 2025-04-20

## 2025-04-20 RX ORDER — LANOLIN ALCOHOL/MO/W.PET/CERES
800 CREAM (GRAM) TOPICAL ONCE
Status: COMPLETED | OUTPATIENT
Start: 2025-04-20 | End: 2025-04-20

## 2025-04-20 RX ORDER — LANOLIN ALCOHOL/MO/W.PET/CERES
400 CREAM (GRAM) TOPICAL ONCE
Status: DISCONTINUED | OUTPATIENT
Start: 2025-04-20 | End: 2025-04-20

## 2025-04-20 RX ADMIN — MORPHINE SULFATE 4 MG: 4 INJECTION, SOLUTION INTRAMUSCULAR; INTRAVENOUS at 04:04

## 2025-04-20 RX ADMIN — MAGNESIUM OXIDE TAB 400 MG (241.3 MG ELEMENTAL MG) 800 MG: 400 (241.3 MG) TAB at 06:04

## 2025-04-20 RX ADMIN — SODIUM CHLORIDE 1000 ML: 9 INJECTION, SOLUTION INTRAVENOUS at 04:04

## 2025-04-20 RX ADMIN — ONDANSETRON 4 MG: 2 INJECTION INTRAMUSCULAR; INTRAVENOUS at 04:04

## 2025-04-20 RX ADMIN — POTASSIUM CHLORIDE 40 MEQ: 1500 TABLET, EXTENDED RELEASE ORAL at 06:04

## 2025-04-20 NOTE — ED PROVIDER NOTES
Encounter Date: 2025       History     Chief Complaint   Patient presents with    Abdominal Pain     Arrives with c/o LLQ abd pain that radiates to (L) flank area onset yesterday, pain worse this morning. Apprx 5-6 weeks pregnant. Also reports dark color urine     Patient presents for evaluation of left-sided flank pain.  Patient notes having sharp severe left-sided flank pain for the past several hours.  Patient denies dysuria increased urinary frequency or hematuria patient does have history of kidney stone in the past.  Patient has a proximally 1 month pregnant.  Patient denies having any other associated symptoms at present.    The history is provided by the patient.     Review of patient's allergies indicates:   Allergen Reactions    Iodinated contrast media Shortness Of Breath    Iodine Rash and Swelling    Shrimp      Past Medical History:   Diagnosis Date    Anxiety disorder, unspecified     not currently    Asthma     Dormant    H. pylori infection     Marijuana user 2023    Vitamin D deficiency      Past Surgical History:   Procedure Laterality Date     SECTION N/A 2023    Procedure:  SECTION;  Surgeon: Davide Hood MD;  Location: [facility] OR;  Service: OB/GYN;  Laterality: N/A;     SECTION N/A 2024    Procedure:  SECTION;  Surgeon: Davide Hood MD;  [facility]: [facility];  Service: OB/GYN;  Laterality: N/A;    CHOLECYSTECTOMY      EXCISION OF VERRUCA Left 1/15/2025    Procedure: EXCISION, WART;  Surgeon: Thomas Bennett MD;  Location: [facility] OR;  Service: General;  Laterality: Left;  Area of excision was 1 -2 cm     Family History   Problem Relation Name Age of Onset    No Known Problems Paternal Grandfather      No Known Problems Paternal Grandmother      No Known Problems Maternal Grandmother      No Known Problems Maternal Grandfather      No Known Problems Father      No Known Problems Mother      No Known Problems Brother      No Known  Problems Sister      Breast cancer Neg Hx      Cervical cancer Neg Hx      Uterine cancer Neg Hx      Ovarian cancer Neg Hx      Colon cancer Neg Hx       Social History[1]  Review of Systems   Constitutional: Negative.    HENT: Negative.     Eyes: Negative.    Respiratory: Negative.     Cardiovascular: Negative.    Gastrointestinal: Negative.         Left-sided flank pain.   Endocrine: Negative.    Genitourinary:  Positive for flank pain.   Skin: Negative.    Allergic/Immunologic: Negative.    Neurological: Negative.    Hematological: Negative.    Psychiatric/Behavioral: Negative.         Physical Exam     Initial Vitals [04/20/25 0339]   BP Pulse Resp Temp SpO2   136/64 81 18 98 °F (36.7 °C) 99 %      MAP       --         Physical Exam    Vitals reviewed.  Constitutional: She appears well-developed and well-nourished.   HENT:   Head: Normocephalic and atraumatic. Mouth/Throat: No oropharyngeal exudate.   Eyes: EOM are normal. Pupils are equal, round, and reactive to light. Right eye exhibits no discharge. Left eye exhibits no discharge.   Neck: Neck supple. No thyromegaly present. No tracheal deviation present.   Normal range of motion.  Cardiovascular:  Normal rate and regular rhythm.           No murmur heard.  Pulmonary/Chest: Breath sounds normal. No stridor. No respiratory distress. She has no wheezes. She has no rales.   Abdominal: Abdomen is soft. Bowel sounds are normal. She exhibits no distension. There is no abdominal tenderness. There is no rebound.   Musculoskeletal:         General: No tenderness or edema. Normal range of motion.      Cervical back: Normal range of motion and neck supple.     Neurological: She is alert and oriented to person, place, and time. She has normal reflexes. No cranial nerve deficit. GCS score is 15. GCS eye subscore is 4. GCS verbal subscore is 5. GCS motor subscore is 6.   Skin: Skin is warm. No erythema.   Psychiatric: She has a normal mood and affect.         ED Course    Procedures  Labs Reviewed   URINALYSIS, REFLEX TO URINE CULTURE - Abnormal       Result Value    Color, UA Yellow      Appearance, UA Clear      Specific Gravity, UA 1.025      pH, UA 6.5      Protein, UA Negative      Glucose, UA Negative      Ketones, UA 40 (*)     Blood, UA Negative      Bilirubin, UA Small (*)     Urobilinogen, UA 2.0 (*)     Nitrites, UA Negative      Leukocyte Esterase, UA Negative      Narrative:      URINE STABILITY IS 2 HOURS AT ROOM TEMP OR    SIX HOURS REFRIGERATED. PERFORMING TESTING ON    SPECIMENS GREATER THAN THIS AGE MAY AFFECT THE    FOLLOWING TESTS:    PH          SPECIFIC GRAVITY           BLOOD    CLARITY     BILIRUBIN               UROBILINOGEN   COMPREHENSIVE METABOLIC PANEL - Abnormal    Sodium 137      Potassium 3.4 (*)     Chloride 106      CO2 24      Glucose 88      Blood Urea Nitrogen 9      Creatinine 0.63 (*)     Calcium 8.7      Protein Total 6.4      Albumin 4.0      Globulin 2.4      Albumin/Globulin Ratio 1.7      Bilirubin Total 1.1 (*)     ALP 58      ALT 11      AST 19      eGFR >90      Anion Gap 7.0      BUN/Creatinine Ratio 14     MAGNESIUM - Abnormal    Magnesium Level 1.70 (*)    CBC WITH DIFFERENTIAL - Abnormal    WBC 7.26      RBC 4.35      Hgb 12.4      Hct 37.3      MCV 85.7      MCH 28.5      MCHC 33.2      RDW 12.6      Platelet 318      MPV 9.4      Neut % 61.2      Lymph % 25.8      Mono % 7.4      Eos % 4.3      Basophil % 1.0      Lymph # 1.87      Neut # 4.45      Mono # 0.54 (*)     Eos # 0.31      Baso # 0.07      Imm Gran # 0.02      Imm Grans % 0.3      NRBC% 0.0     AMYLASE - Normal    Amylase Level 45     LIPASE - Normal    Lipase Level 49     CBC W/ AUTO DIFFERENTIAL    Narrative:     The following orders were created for panel order CBC auto differential.  Procedure                               Abnormality         Status                     ---------                               -----------         ------                     CBC with  Differential[[id number]]       Abnormal            Final result                 Please view results for these tests on the individual orders.   HCG, QUANTITATIVE    Beta HCG Quant 28,557.00      Narrative:     Beta-HCG ref range weeks after implantation (mIU/mL):   3-4wks 9-130   4-5wks    5-6wks [id number]   6-7wks [id number]   7-12wks [id number]   12-16wks [id number]   16-28wks 1400-50455   20-41wks [phone]          Imaging Results              US OB Transvaginal (Preliminary result)  Result time 04/20/25 06:02:32      Preliminary result by Jaguar Mckeon MD (04/20/25 06:02:32)                   Narrative:    START OF REPORT:  Technique: First trimester ultrasound of the pelvis was performed with transvaginal.    Comparison: None.    Clinical history: LT FLANK PN CP.    FINDINGS:  Uterus: The uterus measures 10 cm in sagittal dimension by 6 cm in transverse dimension by 5 cm in AP dimension (172 ml).  Intrauterine gestation: A gravid uterus is present with a gestational sac and yolk sac. The gestational sac measures 1.3 cm which corresponds to an ultrasound estimated gestational age of 6 weeks and 1 day (noted on series 1 image 26).  Subchorionic hemorrhage: No subchorionic hemorrhage is identified. No fetal pole is identified.    Cervix: The cervix is long and closed with a cervical length of 3.5 cm.    Adnexa:  Right Ovary: The right ovary measures 2.5 cm by 3.0 cm by 2.0 cm (8.5 ml).  Left Ovary: There is limited visualization of the left ovary.    Fluid: No free fluid is seen in the pelvis.      Impression:  1. A gravid uterus is present with a gestational sac and yolk sac. The gestational sac measures 1.3 cm which corresponds to an ultrasound estimated gestational age of 6 weeks and 1 day (noted on series 1 image 26).  2. No subchorionic hemorrhage is identified. No fetal pole is identified.  3. Recommend short term serial clinical laboratory and ultrasound followup.                              "            Medications   potassium chloride SA CR tablet 40 mEq (has no administration in time range)   magnesium oxide tablet 800 mg (has no administration in time range)   morphine injection 4 mg (4 mg Intravenous Given 25 0436)   ondansetron injection 4 mg (4 mg Intravenous Given 25 0436)   sodium chloride 0.9% bolus 1,000 mL 1,000 mL (0 mLs Intravenous Stopped 25 0535)     Medical Decision Making  ULTRASOUND NO EVIDENCE OF ECTOPIC. GESTATIONAL SAC PRESENT. DATED 6 WEEKS. PAIN OVER LEFT FLANK. . WILL TAKE TYLENOL AND USE ICY HOT AND ICE UNTIL SEEN BY HER OBGYN.    Amount and/or Complexity of Data Reviewed  Labs: ordered.  Radiology: ordered.    Risk  OTC drugs.  Prescription drug management.                                      Clinical Impression:  Final diagnoses:  [R10.9] Acute left flank pain (Primary)  [E83.42] Hypomagnesemia  [R10.9] Left flank pain  [Z34.91] Normal IUP (intrauterine pregnancy) on prenatal ultrasound, first trimester  [R10.32] LLQ abdominal pain          ED Disposition Condition    Discharge Stable          ED Prescriptions    None       Follow-up Information       Follow up With Specialties Details Why Contact Info    Yann Norris APRN Family Medicine Schedule an appointment as soon as possible for a visit in 2 days  [address]  [address]  [facility] [place] [id number]  [phone]                   [1]   Social History  Tobacco Use    Smoking status: Former     Types: Cigarettes     Start date: 2024     Quit date: 2016     Years since quittin.3     Passive exposure: Current    Smokeless tobacco: Never    Tobacco comments:     3/21/23 per pt "I smoke  only 1-3 cigarettes a day."     Recently quit   Substance Use Topics    Alcohol use: Not Currently    Drug use: Not Currently     Types: Marijuana        Kaela Jason MD  [phone]    "

## 2025-04-20 NOTE — Clinical Note
"Jamia Sykes" Luis was seen and treated in our emergency department on 4/20/2025.  She may return to work on 04/21/2025.       If you have any questions or concerns, please don't hesitate to call.       RN    "

## 2025-04-23 NOTE — PROGRESS NOTES
HPI  29 y.o.  at Unknown here for OB rescan.  She was seen in the ER on  for an episode of left lower quadrant pain.  Ultrasound at that time demonstrated  pregnancy.  Yolk sac fetal pole were not demonstrated.  Pain has since resolved.    25:  FINDINGS:  There is an intrauterine gestational sac without a fetal pole identified.  A fetal pole yolk sac is not demonstrated  A subchorionic hemorrhage is not seen.  The right ovary measures 2.5 x 2.8 x 2.3 cm.  The left ovary is incompletely visualized due to overlying bowel gas.  Impression:  Intrauterine gestational sac without a fetal pole demonstrated.  Differential would include very early intrauterine pregnancy versus incomplete A/B versus ectopic pregnancy.  Would recommend follow-up studies.    25:  TVUS  IUP with GS: 4w5d  YS present  No FP or CRL  Small free pelvic fluid  Normal right ovary  Left ovary not identified.      ROS  Review of Systems   Constitutional:  Negative for chills and fever.   Gastrointestinal:  Negative for abdominal pain, constipation and diarrhea.   Genitourinary:  Negative for flank pain, genital sores, pelvic pain, urgency, vaginal bleeding, vaginal discharge, vaginal pain, postcoital bleeding and vaginal odor.         OBJECTIVE  /60   Wt 57.7 kg (127 lb 3.2 oz)   LMP 2025   BMI 21.83 kg/m²     General Exam:    General Appearance: alert, in no acute distress, normal, well nourished.  Psych:  Orientation: time, place, person.  Mood/Affect: Normal   Abdomen:  - Soft. Non-tender. No rebound tenderness or guardingNo masses. Liver normal. Spleen normal. No hernia palpable.  Pelvis:   - Vulva: Normal   - Perianal skin: Normal   - Urethra: Normal meatus. Q-tip: Not performed   - Vagina: NormalVaginal discharge present: . Cystocele: Absent. Rectocele: Absent   - Cervix: Normal. Cervical motion tenderness Absent   - Uterus:  Mobile. Non-tender.   - Adnexal: Normal    TVUS:    IUP W CRL: 7w1c  IVANNA:  12/14/25 by Today's u/s  FHT: 140          ASSESSMENT  1. Early stage of pregnancy  - POCT Urinalysis, Dipstick, Automated, W/O Scope  - US OB/GYN Procedure (Viewpoint) - Extended List; Future  - US OB/GYN Procedure (Viewpoint) - Extended List    2. Positive pregnancy test  - POCT Urinalysis, Dipstick, Automated, W/O Scope  - US OB/GYN Procedure (Viewpoint) - Extended List; Future  - US OB/GYN Procedure (Viewpoint) - Extended List    3. Establish gestational age, ultrasound  - POCT Urinalysis, Dipstick, Automated, W/O Scope  - US OB/GYN Procedure (Viewpoint) - Extended List; Future  - US OB/GYN Procedure (Viewpoint) - Extended List        PLAN    Reviewed ER notes and ultrasound result.    Prenatal counseling  Discussed appropriate weight gain for pregnancy  Tobacco and marijuana avoidance/cessation  Illicit drug avoidance  PNL  PNV    RTC 4 weeks ob check     This note was transcribed by Obdulia Del Angel. There may be transcription errors as a result, however minimal. Effort has been made to ensure accuracy of transcription, but any obvious errors or omissions should be clarified with the author of the document.       I agree with the above documentation.

## 2025-04-28 ENCOUNTER — ROUTINE PRENATAL (OUTPATIENT)
Dept: OBSTETRICS AND GYNECOLOGY | Facility: CLINIC | Age: 30
End: 2025-04-28
Payer: MEDICAID

## 2025-04-28 VITALS
WEIGHT: 127.19 LBS | DIASTOLIC BLOOD PRESSURE: 60 MMHG | SYSTOLIC BLOOD PRESSURE: 112 MMHG | BODY MASS INDEX: 21.83 KG/M2

## 2025-04-28 DIAGNOSIS — Z36.89 ESTABLISH GESTATIONAL AGE, ULTRASOUND: ICD-10-CM

## 2025-04-28 DIAGNOSIS — Z32.01 POSITIVE PREGNANCY TEST: ICD-10-CM

## 2025-04-28 DIAGNOSIS — Z3A.01 7 WEEKS GESTATION OF PREGNANCY: ICD-10-CM

## 2025-04-28 DIAGNOSIS — F12.90 MARIJUANA USE DURING PREGNANCY: Primary | ICD-10-CM

## 2025-04-28 DIAGNOSIS — O99.320 MARIJUANA USE DURING PREGNANCY: Primary | ICD-10-CM

## 2025-04-28 DIAGNOSIS — Z34.90 EARLY STAGE OF PREGNANCY: ICD-10-CM

## 2025-05-19 ENCOUNTER — ROUTINE PRENATAL (OUTPATIENT)
Dept: OBSTETRICS AND GYNECOLOGY | Facility: CLINIC | Age: 30
End: 2025-05-19
Payer: MEDICAID

## 2025-05-19 VITALS — BODY MASS INDEX: 21.9 KG/M2 | SYSTOLIC BLOOD PRESSURE: 124 MMHG | DIASTOLIC BLOOD PRESSURE: 64 MMHG | WEIGHT: 127.63 LBS

## 2025-05-19 DIAGNOSIS — N30.01 ACUTE CYSTITIS WITH HEMATURIA: Primary | ICD-10-CM

## 2025-05-19 DIAGNOSIS — Z3A.10 10 WEEKS GESTATION OF PREGNANCY: ICD-10-CM

## 2025-05-19 DIAGNOSIS — R31.9 HEMATURIA, UNSPECIFIED TYPE: ICD-10-CM

## 2025-05-19 LAB
BILIRUB UR QL STRIP: NEGATIVE
GLUCOSE UR QL STRIP: NEGATIVE
KETONES UR QL STRIP: NEGATIVE
LEUKOCYTE ESTERASE UR QL STRIP: POSITIVE
PH, POC UA: 6.5
POC BLOOD, URINE: POSITIVE
POC NITRATES, URINE: NEGATIVE
PROT UR QL STRIP: POSITIVE
SP GR UR STRIP: 1.03 (ref 1–1.03)
UROBILINOGEN UR STRIP-ACNC: 0.2 (ref 0.1–1.1)

## 2025-05-19 PROCEDURE — 87086 URINE CULTURE/COLONY COUNT: CPT

## 2025-05-19 RX ORDER — CEPHALEXIN 500 MG/1
500 CAPSULE ORAL 4 TIMES DAILY
Qty: 28 CAPSULE | Refills: 0 | Status: SHIPPED | OUTPATIENT
Start: 2025-05-19 | End: 2025-05-22 | Stop reason: DRUGHIGH

## 2025-05-19 NOTE — PROGRESS NOTES
Chief Complaint:  Dysuria (C/o pain and burning with urination, blood in urine, and bladder spasms x1-2 days and getting worse. )    History of Present Illness:  Jamia Smith is a 29 y.o. year old  at 10w1d presents with c/o burning and frequency with urination with associated hematuria. C/o bladder spasms that past 2 days.         Review of Systems:  General/Constitutional: Fever denies. Headache denies.     Skin: Rash denies.   Respiratory: Cough denies. SOB denies. Wheezing denies .   Cardiovascular: Chest pain denies. Dizziness denies. Palpitations denies. Swelling in hands/feet denies.    Gastrointestinal: Abdominal pain denies. Constipation denies. Diarrhea denies. Heartburn denies. Nausea denies. Vomiting denies.  Genitourinary: Painful urination admits.   Gynecologic: Vaginal bleeding denies. Vaginal discharge Normal. Leaking amniotic fluid denies.  Contractions denies.  Psychiatric: Depressed mood denies. Suicidal thoughts denies.     Current Medications[1]    Physical Exam:  /64   Wt 57.9 kg (127 lb 9.6 oz)   LMP 2025   BMI 21.90 kg/m²       Constitutional: General appearance: healthy, well-nourished and well-developed   Psychiatric:  Orientation to time, place and person. Normal mood and affect and active, alert   Abdomen: Auscultation/Inspection/Palpation: Gravid. No tenderness or masses. Soft, nondistended   Extremities: no CCE      Assessment/Plan  1. Acute cystitis with hematuria  -     cephALEXin (KEFLEX) 500 MG capsule; Take 1 capsule (500 mg total) by mouth 4 (four) times daily. for 7 days  Dispense: 28 capsule; Refill: 0    2. 10 weeks gestation of pregnancy    3. Hematuria, unspecified type  -     POCT Urinalysis, Dipstick, Automated, W/O Scope  -     Urine Culture High Risk    Educated, UA  Discussed urinalysis results and patients symptoms  Culture sent to lab  Advised to to call if symptoms do not improve within 24 hrs or if she develops fever  Rx: Keflex 500mg QID x7  days    Keep f/u tummy check next week with Dr. Hood            Educated on compliance of future appts  Future Appointments   Date Time Provider Department Center   5/27/2025  2:40 PM Davide Hood MD [facility]   6/24/2025  8:40 AM LAB, [facility] LABORATORY DRAW STATION [facility] DOROTHY Guajardo   7/2/2025  2:00 PM Yann Norris APRN [facility]             [1]   Current Outpatient Medications:     cephALEXin (KEFLEX) 500 MG capsule, Take 1 capsule (500 mg total) by mouth 4 (four) times daily. for 7 days, Disp: 28 capsule, Rfl: 0

## 2025-05-21 ENCOUNTER — RESULTS FOLLOW-UP (OUTPATIENT)
Dept: OBSTETRICS AND GYNECOLOGY | Facility: CLINIC | Age: 30
End: 2025-05-21
Payer: MEDICAID

## 2025-05-21 DIAGNOSIS — N39.0 E-COLI UTI: Primary | ICD-10-CM

## 2025-05-21 DIAGNOSIS — B96.20 E-COLI UTI: Primary | ICD-10-CM

## 2025-05-21 NOTE — PROGRESS NOTES
HPI  29 y.o.  at 11w2d  here for OB check.  Treated  with Macrobid BID x7 days for dysuria. Urine culture:  Positive for E coli.  She is feeling better.         ROS  Review of Systems   Constitutional:  Negative for chills and fever.   Gastrointestinal:  Negative for abdominal pain, constipation and diarrhea.   Genitourinary:  Negative for flank pain, genital sores, pelvic pain, urgency, vaginal bleeding, vaginal discharge, vaginal pain, postcoital bleeding and vaginal odor.         OBJECTIVE  /66   Wt 58.1 kg (128 lb)   LMP 2025   BMI 21.97 kg/m²     Gen: No distress  Abdomen: Gravid, non-tender  Extremities: No edema    FHT: 160        ASSESSMENT  1. Urinary tract infection in mother during first trimester of pregnancy    2. 11 weeks gestation of pregnancy  - POCT Urinalysis, Dipstick, Automated, W/O Scope  - Urine Culture High Risk    3. Marijuana use during pregnancy        PLAN  Reviewed standard labor unit and kick count precautions.  Discussed pre-eclampsia precautions.  Discussed COVID-19 risks, social distancing, and isolation if respiratory symptoms develop.       cont Macrobid as directed, precautions     Urine culture test of cure on return to clinic    RTC 4 WEEKS OB CHECK     This note was transcribed by Obdulia Del Angel. There may be transcription errors as a result, however minimal. Effort has been made to ensure accuracy of transcription, but any obvious errors or omissions should be clarified with the author of the document.       I agree with the above documentation.

## 2025-05-22 LAB — BACTERIA UR CULT: ABNORMAL

## 2025-05-22 RX ORDER — NITROFURANTOIN 25; 75 MG/1; MG/1
100 CAPSULE ORAL 2 TIMES DAILY
Qty: 14 CAPSULE | Refills: 0 | Status: SHIPPED | OUTPATIENT
Start: 2025-05-22 | End: 2025-05-29

## 2025-05-22 NOTE — PROGRESS NOTES
Called pt to relay Tawnya's message. She agrees with plan of care and verbalized a clear understanding.

## 2025-05-27 ENCOUNTER — LAB VISIT (OUTPATIENT)
Dept: LAB | Facility: HOSPITAL | Age: 30
End: 2025-05-27
Attending: OBSTETRICS & GYNECOLOGY
Payer: COMMERCIAL

## 2025-05-27 ENCOUNTER — ROUTINE PRENATAL (OUTPATIENT)
Dept: OBSTETRICS AND GYNECOLOGY | Facility: CLINIC | Age: 30
End: 2025-05-27
Payer: MEDICAID

## 2025-05-27 VITALS — SYSTOLIC BLOOD PRESSURE: 120 MMHG | BODY MASS INDEX: 21.97 KG/M2 | DIASTOLIC BLOOD PRESSURE: 66 MMHG | WEIGHT: 128 LBS

## 2025-05-27 DIAGNOSIS — O23.41 URINARY TRACT INFECTION IN MOTHER DURING FIRST TRIMESTER OF PREGNANCY: Primary | ICD-10-CM

## 2025-05-27 DIAGNOSIS — O99.320 MARIJUANA USE DURING PREGNANCY: ICD-10-CM

## 2025-05-27 DIAGNOSIS — Z3A.11 11 WEEKS GESTATION OF PREGNANCY: ICD-10-CM

## 2025-05-27 DIAGNOSIS — Z3A.01 7 WEEKS GESTATION OF PREGNANCY: ICD-10-CM

## 2025-05-27 DIAGNOSIS — F12.90 MARIJUANA USE DURING PREGNANCY: ICD-10-CM

## 2025-05-27 LAB
BASOPHILS # BLD AUTO: 0.05 X10(3)/MCL (ref 0.01–0.08)
BASOPHILS NFR BLD AUTO: 0.4 % (ref 0.1–1.2)
BILIRUB UR QL STRIP: NEGATIVE
EOSINOPHIL # BLD AUTO: 0.18 X10(3)/MCL (ref 0.04–0.36)
EOSINOPHIL NFR BLD AUTO: 1.5 % (ref 0.7–7)
ERYTHROCYTE [DISTWIDTH] IN BLOOD BY AUTOMATED COUNT: 13.6 % (ref 11–14.5)
GLUCOSE UR QL STRIP: NEGATIVE
GROUP & RH: NORMAL
HBV SURFACE AG SERPL QL IA: NEGATIVE
HBV SURFACE AG SERPL QL IA: NORMAL
HCT VFR BLD AUTO: 39.2 % (ref 36–48)
HGB BLD-MCNC: 12.9 G/DL (ref 11.8–16)
IMM GRANULOCYTES # BLD AUTO: 0.05 X10(3)/MCL (ref 0–0.03)
IMM GRANULOCYTES NFR BLD AUTO: 0.4 % (ref 0–0.5)
INDIRECT COOMBS: NORMAL
KETONES UR QL STRIP: NEGATIVE
LEUKOCYTE ESTERASE UR QL STRIP: NEGATIVE
LYMPHOCYTES # BLD AUTO: 1.86 X10(3)/MCL (ref 1.16–3.74)
LYMPHOCYTES NFR BLD AUTO: 15.4 % (ref 20–55)
MCH RBC QN AUTO: 28.7 PG (ref 27–34)
MCHC RBC AUTO-ENTMCNC: 32.9 G/DL (ref 31–37)
MCV RBC AUTO: 87.1 FL (ref 79–99)
MONOCYTES # BLD AUTO: 0.74 X10(3)/MCL (ref 0.24–0.36)
MONOCYTES NFR BLD AUTO: 6.1 % (ref 4.7–12.5)
NEUTROPHILS # BLD AUTO: 9.2 X10(3)/MCL (ref 1.56–6.13)
NEUTROPHILS NFR BLD AUTO: 76.2 % (ref 37–73)
NRBC BLD AUTO-RTO: 0 %
PH, POC UA: 7
PLATELET # BLD AUTO: 368 X10(3)/MCL (ref 140–371)
PMV BLD AUTO: 9.5 FL (ref 9.4–12.4)
POC BLOOD, URINE: POSITIVE
POC NITRATES, URINE: NEGATIVE
PROT UR QL STRIP: NEGATIVE
RBC # BLD AUTO: 4.5 X10(6)/MCL (ref 4–5.1)
RUBELLA AB, IGG (OLG): 2.64 IU/ML
SP GR UR STRIP: 1.03 (ref 1–1.03)
SPECIMEN OUTDATE: NORMAL
UROBILINOGEN UR STRIP-ACNC: 0.2 (ref 0.1–1.1)
WBC # BLD AUTO: 12.08 X10(3)/MCL (ref 4–11.5)

## 2025-05-27 PROCEDURE — 85025 COMPLETE CBC W/AUTO DIFF WBC: CPT

## 2025-05-27 PROCEDURE — 36415 COLL VENOUS BLD VENIPUNCTURE: CPT

## 2025-05-27 PROCEDURE — 87340 HEPATITIS B SURFACE AG IA: CPT

## 2025-05-27 PROCEDURE — 86803 HEPATITIS C AB TEST: CPT

## 2025-05-27 PROCEDURE — 86780 TREPONEMA PALLIDUM: CPT

## 2025-05-27 PROCEDURE — 86787 VARICELLA-ZOSTER ANTIBODY: CPT

## 2025-05-27 PROCEDURE — 86901 BLOOD TYPING SEROLOGIC RH(D): CPT | Performed by: OBSTETRICS & GYNECOLOGY

## 2025-05-27 PROCEDURE — 87086 URINE CULTURE/COLONY COUNT: CPT | Performed by: OBSTETRICS & GYNECOLOGY

## 2025-05-27 PROCEDURE — 87389 HIV-1 AG W/HIV-1&-2 AB AG IA: CPT

## 2025-05-27 PROCEDURE — 86762 RUBELLA ANTIBODY: CPT

## 2025-05-28 LAB
HCV AB SERPL QL IA: NONREACTIVE
HIV 1+2 AB+HIV1 P24 AG SERPL QL IA: NONREACTIVE
T PALLIDUM AB SER QL: NONREACTIVE

## 2025-05-29 LAB
VZV IGG SER IA-ACNC: 0.7
VZV IGG SER QL IA: NEGATIVE

## 2025-05-31 LAB — BACTERIA UR CULT: NORMAL

## 2025-06-03 ENCOUNTER — TELEPHONE (OUTPATIENT)
Dept: FAMILY MEDICINE | Facility: CLINIC | Age: 30
End: 2025-06-03
Payer: COMMERCIAL

## 2025-06-07 ENCOUNTER — HOSPITAL ENCOUNTER (EMERGENCY)
Facility: HOSPITAL | Age: 30
Discharge: HOME | End: 2025-06-07
Attending: OTOLARYNGOLOGY
Payer: COMMERCIAL

## 2025-06-07 VITALS
SYSTOLIC BLOOD PRESSURE: 115 MMHG | HEART RATE: 79 BPM | RESPIRATION RATE: 20 BRPM | DIASTOLIC BLOOD PRESSURE: 67 MMHG | OXYGEN SATURATION: 98 % | TEMPERATURE: 98 F

## 2025-06-07 DIAGNOSIS — H60.312 DIFFUSE OTITIS EXTERNA OF LEFT EAR, UNSPECIFIED CHRONICITY: Primary | ICD-10-CM

## 2025-06-07 PROCEDURE — 96372 THER/PROPH/DIAG INJ SC/IM: CPT | Performed by: OTOLARYNGOLOGY

## 2025-06-07 PROCEDURE — 99284 EMERGENCY DEPT VISIT MOD MDM: CPT | Mod: 25

## 2025-06-07 PROCEDURE — 63600175 PHARM REV CODE 636 W HCPCS: Performed by: OTOLARYNGOLOGY

## 2025-06-07 RX ORDER — DEXAMETHASONE SODIUM PHOSPHATE 4 MG/ML
8 INJECTION, SOLUTION INTRA-ARTICULAR; INTRALESIONAL; INTRAMUSCULAR; INTRAVENOUS; SOFT TISSUE
Status: COMPLETED | OUTPATIENT
Start: 2025-06-07 | End: 2025-06-07

## 2025-06-07 RX ORDER — CEFTRIAXONE 1 G/1
1 INJECTION, POWDER, FOR SOLUTION INTRAMUSCULAR; INTRAVENOUS
Status: COMPLETED | OUTPATIENT
Start: 2025-06-07 | End: 2025-06-07

## 2025-06-07 RX ORDER — KETOROLAC TROMETHAMINE 30 MG/ML
60 INJECTION, SOLUTION INTRAMUSCULAR; INTRAVENOUS
Status: DISCONTINUED | OUTPATIENT
Start: 2025-06-07 | End: 2025-06-07

## 2025-06-07 RX ORDER — OFLOXACIN 3 MG/ML
3 SOLUTION AURICULAR (OTIC) 2 TIMES DAILY
Qty: 10 ML | Refills: 0 | Status: SHIPPED | OUTPATIENT
Start: 2025-06-07 | End: 2025-06-07

## 2025-06-07 RX ORDER — OFLOXACIN 3 MG/ML
3 SOLUTION AURICULAR (OTIC) 2 TIMES DAILY
Qty: 10 ML | Refills: 0 | Status: SHIPPED | OUTPATIENT
Start: 2025-06-07 | End: 2025-06-14

## 2025-06-07 RX ORDER — AMOXICILLIN AND CLAVULANATE POTASSIUM 875; 125 MG/1; MG/1
1 TABLET, FILM COATED ORAL 2 TIMES DAILY
Qty: 14 TABLET | Refills: 0 | Status: SHIPPED | OUTPATIENT
Start: 2025-06-07

## 2025-06-07 RX ADMIN — CEFTRIAXONE SODIUM 1 G: 1 INJECTION, POWDER, FOR SOLUTION INTRAMUSCULAR; INTRAVENOUS at 07:06

## 2025-06-07 RX ADMIN — DEXAMETHASONE SODIUM PHOSPHATE 8 MG: 4 INJECTION, SOLUTION INTRA-ARTICULAR; INTRALESIONAL; INTRAMUSCULAR; INTRAVENOUS; SOFT TISSUE at 07:06

## 2025-06-07 NOTE — ED PROVIDER NOTES
Encounter Date: 2025       History   No chief complaint on file.    PATIENT HAVING RIGHT EAR PAIN WITH TOUCHING OR PULLING ON RIGHT EAR AND DRAINAGE. SHE WENT SWIMMING A WEEK AGO. NO HISTORY OF RECURRENT EAR INFECTIONS. NO HISTORY OF PET'S.        Review of patient's allergies indicates:   Allergen Reactions    Iodinated contrast media Shortness Of Breath    Iodine Rash and Swelling    Shrimp      Past Medical History:   Diagnosis Date    Anxiety disorder, unspecified     not currently    Asthma     Dormant    H. pylori infection     Marijuana user 2023    Vitamin D deficiency      Past Surgical History:   Procedure Laterality Date     SECTION N/A 2023    Procedure:  SECTION;  Surgeon: Daivde Hood MD;  Location: [facility] OR;  Service: OB/GYN;  Laterality: N/A;     SECTION N/A 2024    Procedure:  SECTION;  Surgeon: Davide Hood MD;  Location: [facility] OR;  Service: OB/GYN;  Laterality: N/A;    CHOLECYSTECTOMY      EXCISION OF VERRUCA Left 1/15/2025    Procedure: EXCISION, WART;  Surgeon: Thomas Bennett MD;  Location: [facility] OR;  Service: General;  Laterality: Left;  Area of excision was 1 -2 cm     Family History   Problem Relation Name Age of Onset    No Known Problems Paternal Grandfather      No Known Problems Paternal Grandmother      No Known Problems Maternal Grandmother      No Known Problems Maternal Grandfather      No Known Problems Father      No Known Problems Mother      No Known Problems Brother      No Known Problems Sister      Breast cancer Neg Hx      Cervical cancer Neg Hx      Uterine cancer Neg Hx      Ovarian cancer Neg Hx      Colon cancer Neg Hx       Social History[1]  Review of Systems   HENT:  Positive for ear pain.    All other systems reviewed and are negative.      Physical Exam     Initial Vitals   BP Pulse Resp Temp SpO2   -- -- -- -- --      MAP       --         Physical Exam    Constitutional: She appears well-developed  and well-nourished.   HENT:   Head: Normocephalic and atraumatic.   Left Ear: External ear normal.   Nose: Nose normal. Mouth/Throat: Oropharynx is clear and moist.   LEFT EAR PAIN WITH PUSHING ON TRAGUS OR TOUCHING AURICLE, EAC OPEN WITH WHITE DEBRIS, TM WNL   Eyes: Conjunctivae and EOM are normal. Pupils are equal, round, and reactive to light.   Neck: Neck supple.   Normal range of motion.  Cardiovascular:  Normal rate and regular rhythm.           Abdominal: Abdomen is soft. Bowel sounds are normal.   Musculoskeletal:         General: Normal range of motion.      Cervical back: Normal range of motion and neck supple.     Neurological: She is alert and oriented to person, place, and time. She has normal strength. GCS score is 15. GCS eye subscore is 4. GCS verbal subscore is 5. GCS motor subscore is 6.   Skin: Skin is warm and dry.   Psychiatric: She has a normal mood and affect. Her behavior is normal. Judgment and thought content normal.         ED Course   Procedures  Labs Reviewed - No data to display       Imaging Results    None          Medications   dexAMETHasone injection 8 mg (has no administration in time range)   cefTRIAXone injection 1 g (has no administration in time range)     Medical Decision Making  OTITIS EXTERNA AD.    Risk  Prescription drug management.                                      Clinical Impression:  Final diagnoses:  [H60.312] Diffuse otitis externa of left ear, unspecified chronicity (Primary)          ED Disposition Condition    Discharge Stable          ED Prescriptions       Medication Sig Dispense Start Date End Date Auth. Provider    ofloxacin (FLOXIN) 0.3 % otic solution Place 3 drops into the right ear 2 (two) times daily. for 7 days 10 mL 6/7/2025 6/14/2025 Kaela Jason MD    amoxicillin-clavulanate 875-125mg (AUGMENTIN) 875-125 mg per tablet Take 1 tablet by mouth 2 (two) times daily. 14 tablet 6/7/2025 -- Kaela Jason MD          Follow-up Information    None    "             [1]   Social History  Tobacco Use    Smoking status: Former     Types: Cigarettes     Start date: 2024     Quit date: 2016     Years since quittin.4     Passive exposure: Current    Smokeless tobacco: Never    Tobacco comments:     3/21/23 per pt "I smoke  only 1-3 cigarettes a day."     Recently quit   Substance Use Topics    Alcohol use: Not Currently    Drug use: Not Currently     Types: Marijuana        Kaela Jason MD  25 0728    "

## 2025-06-07 NOTE — Clinical Note
"Jamia Sykes" Luis was seen and treated in our emergency department on 6/7/2025.  She may return to work on 06/08/2025.       If you have any questions or concerns, please don't hesitate to call.      Radha Akhtar RN    "

## 2025-06-07 NOTE — ED NOTES
SEE PAPERCHARTING FOR COMPUTER DOWNTIME. NURSE TO NURSE REPORT RECEIVED FROM HILARY FUNEZ. WAITING ON DR PINON TO SEE.

## 2025-06-07 NOTE — ED NOTES
Pt currently on Augmentin picked up on the 4th. Per Dr Jason continue taking Augmentin as prescribed from [facility] and  Ear Drops. Follow up with PCP. Pt states understanding. Work note provided for today.    1 pair

## 2025-06-09 ENCOUNTER — TELEPHONE (OUTPATIENT)
Dept: OBSTETRICS AND GYNECOLOGY | Facility: CLINIC | Age: 30
End: 2025-06-09
Payer: COMMERCIAL

## 2025-06-09 NOTE — TELEPHONE ENCOUNTER
Pt states she was told she has a subchronic hematoma, Per  , subchronic hematoma is very common,pelvic rest,no intercourse.Pt states she is staying in [place] so  advised that she finds a doctor closer to her. Pt voiced understanding.

## 2025-06-09 NOTE — TELEPHONE ENCOUNTER
----- Message from Cara sent at 6/9/2025  3:18 PM CDT -----  Regarding: Patient case  .Type:  Needs Medical AdviceWhsusu Called:  Zulma Call Back Number:  [phone] Information:  called stating that she went to [facility] in [place]- they told her she has a hematoma and it could heal up or possibly not- took her out of work for 1wk and put her on bedrest- baby heartbeat still good but was told to take Tylenol for pain- wanted Dr Augustine's opinion before she followed up with them as they requested, attempted to get a nurse- no response  ----- Message -----  From: Cara Mccarthy  Sent: 6/9/2025   3:22 PM CDT  To: Sana Augustine Staff  Subject: Patient case                                     .Type:  Needs Medical AdviceWhsusu Called:  Zulma Call Back Number:  [phone] Information:  called stating that she went to [facility] in [place]- they told her she has a hematoma and it could heal up or possibly not- took her out of work for 1wk and put her on bedrest- baby heartbeat still good but was told to take Tylenol for pain- wanted Dr Augustine's opinion before she followed up with them as they requested

## 2025-06-20 NOTE — PROGRESS NOTES
HPI  29 y.o.  at 14w5d  here for OB check. Seen at [facility] in  , instructed of Subchorionic Hematoma noted   Reports positive fetal movement. denies FLORENTINO PEÑA.     25  COMMENTS:  Single living intrauterine gestation with an estimated ultrasound age   of 13 weeks and 4 days by combined measurements. There is a large fluid   collection at the posterior aspect of the forming placenta measuring at least   6.3 x 6.1 x 2.3 cm, most compatible with a subchorionic hemorrhage/hematoma.   Continued clinical management and follow up advised.     ROS  Review of Systems   Constitutional:  Negative for chills and fever.   Gastrointestinal:  Negative for abdominal pain, constipation and diarrhea.   Genitourinary:  Negative for flank pain, genital sores, pelvic pain, urgency, vaginal bleeding, vaginal discharge, vaginal pain, postcoital bleeding and vaginal odor.         OBJECTIVE  LMP 2025     General Exam:    General Appearance: alert, in no acute distress, normal, well nourished.  Psych:  Orientation: time, place, person.  Mood/Affect: Normal   Abdomen:  - Soft. Non-tender. No rebound tenderness or guardingNo masses. Liver normal. Spleen normal. No hernia palpable.  Pelvis:   - Vulva: Normal   - Perianal skin: Normal   - Urethra: Normal meatus. Q-tip: Not performed   - Vagina: NormalVaginal discharge present: . Cystocele: Absent. Rectocele: Absent   - Cervix: Normal. Cervical motion tenderness Absent   - Uterus:Mobile. Non-tender.   - Adnexal: Normal    TUVS:       ASSESSMENT  There are no diagnoses linked to this encounter.      PLAN  Reviewed standard labor unit and kick count precautions.  Discussed pre-eclampsia precautions.  Discussed COVID-19 risks, social distancing, and isolation if respiratory symptoms develop.           RTC     This note was transcribed by Obdulia Del Angel. There may be transcription errors as a result, however minimal. Effort has been made to ensure accuracy of transcription,  but any obvious errors or omissions should be clarified with the author of the document.       I agree with the above documentation.

## 2025-06-23 ENCOUNTER — ROUTINE PRENATAL (OUTPATIENT)
Dept: OBSTETRICS AND GYNECOLOGY | Facility: CLINIC | Age: 30
End: 2025-06-23
Payer: COMMERCIAL

## 2025-06-23 VITALS — SYSTOLIC BLOOD PRESSURE: 102 MMHG | BODY MASS INDEX: 21.97 KG/M2 | DIASTOLIC BLOOD PRESSURE: 58 MMHG | WEIGHT: 128 LBS

## 2025-06-23 DIAGNOSIS — O41.8X20 SUBCHORIONIC HEMATOMA IN SECOND TRIMESTER, SINGLE OR UNSPECIFIED FETUS: ICD-10-CM

## 2025-06-23 DIAGNOSIS — O46.8X2 SUBCHORIONIC HEMATOMA IN SECOND TRIMESTER, SINGLE OR UNSPECIFIED FETUS: ICD-10-CM

## 2025-06-23 DIAGNOSIS — Z3A.15 15 WEEKS GESTATION OF PREGNANCY: Primary | ICD-10-CM

## 2025-06-23 PROCEDURE — 0502F SUBSEQUENT PRENATAL CARE: CPT | Mod: CPTII,,, | Performed by: OBSTETRICS & GYNECOLOGY

## 2025-06-23 NOTE — PROGRESS NOTES
HPI  29 y.o.  at 15w1d  here for OB check. Seen at [facility] in  , instructed of Subchorionic Hematoma noted. Since then, bleeding has greatly decreased.   Reports positive fetal movement. denies CASEY CTX.     25  COMMENTS:  Single living intrauterine gestation with an estimated ultrasound age   of 13 weeks and 4 days by combined measurements. There is a large fluid   collection at the posterior aspect of the forming placenta measuring at least   6.3 x 6.1 x 2.3 cm, most compatible with a subchorionic hemorrhage/hematoma.   Continued clinical management and follow up advised.     ROS  Review of Systems   Constitutional:  Negative for chills and fever.   Gastrointestinal:  Negative for abdominal pain, constipation and diarrhea.   Genitourinary:  Negative for flank pain, genital sores, pelvic pain, urgency, vaginal bleeding, vaginal discharge, vaginal pain, postcoital bleeding and vaginal odor.         OBJECTIVE  BP (!) 102/58   Wt 58.1 kg (128 lb)   LMP 2025   BMI 21.97 kg/m²     General Exam:    General Appearance: alert, in no acute distress, normal, well nourished.  Psych:  Orientation: time, place, person.  Mood/Affect: Normal   Abdomen:  - Soft. Non-tender. No rebound tenderness or guardingNo masses. Liver normal. Spleen normal. No hernia palpable.        FHT: 150    ASSESSMENT  Subchorionic hematoma in second trimester, single or unspecified fetus  Educated  Referral to [facility]   6.2cm x2.2 cm today in size    2. 15 weeks gestation of pregnancy  Instructed on weight gain, healthy exercise, vitamins, avoidance of drugs tobacco and alcohol. Pain, fever, bleeding precautions.        Discussed with patient travel precautions.  MASFP    PLAN  Reviewed standard labor unit and kick count precautions.  Discussed pre-eclampsia precautions.  Discussed COVID-19 risks, social distancing, and isolation if respiratory symptoms develop.       RTC 3 weeks Dr. Hood     This note was transcribed by  Jamia Matute. There may be transcription errors as a result, however minimal. Effort has been made to ensure accuracy of transcription, but any obvious errors or omissions should be clarified with the author of the document.

## 2025-06-24 ENCOUNTER — TELEPHONE (OUTPATIENT)
Dept: MATERNAL FETAL MEDICINE | Facility: CLINIC | Age: 30
End: 2025-06-24
Payer: MEDICAID

## 2025-06-24 NOTE — TELEPHONE ENCOUNTER
Contacted pt in regards to scheduling her referral. Pt has [facility] plan (which is out of network and we do not accept) I explained to the patient the reasoning we could not accept it and confirmed she does have [facility] but as [facility] being her primary we could not schedule her.     I gave her contact information so she can get it removed. We discussed deferring her referral for 14 days to give her some time to see if they will remove it as she never signed up for that insurance. After going over everything, patient verbalized understanding.

## 2025-06-29 ENCOUNTER — PATIENT MESSAGE (OUTPATIENT)
Dept: OTHER | Facility: OTHER | Age: 30
End: 2025-06-29
Payer: MEDICAID

## 2025-06-30 ENCOUNTER — TELEPHONE (OUTPATIENT)
Dept: MATERNAL FETAL MEDICINE | Facility: CLINIC | Age: 30
End: 2025-06-30
Payer: MEDICAID

## 2025-06-30 DIAGNOSIS — O41.8X20 SUBCHORIONIC HEMATOMA IN SECOND TRIMESTER: ICD-10-CM

## 2025-06-30 DIAGNOSIS — O46.8X2 SUBCHORIONIC HEMATOMA IN SECOND TRIMESTER: ICD-10-CM

## 2025-06-30 DIAGNOSIS — Z36.89 ENCOUNTER FOR FETAL ANATOMIC SURVEY: Primary | ICD-10-CM

## 2025-06-30 PROBLEM — O34.219 PREVIOUS CESAREAN DELIVERY AFFECTING PREGNANCY: Status: ACTIVE | Noted: 2023-05-15

## 2025-06-30 PROBLEM — O20.9 VAGINAL BLEEDING AFFECTING EARLY PREGNANCY: Status: ACTIVE | Noted: 2025-06-30

## 2025-06-30 NOTE — PROGRESS NOTES
Maternal Fetal Medicine New Consult    Subjective:     Patient ID: [id number]    Chief Complaint: mfm consult w/us      HPI: 29 y.o.  female  at 16w3d gestation with Estimated Date of Delivery: 25 by early US and unknown LMP. She is sent for MFM consultation for subchorionic hemorrhage.     She was evaluated at [facility] in [place] on 2025 due to complaint of vaginal bleeding and noted to have a subchorionic hemorrhage (6.3 x 6.1 x 2.3 cm).  She reports intermittent light pink vaginal bleeding almost every day currently. She reports she experienced bright red blood vaginal bleeding most recently 2 weeks ago with a currently only some brownish discharge and some pinkish spotting on wiping. Her hemoglobin hematocrit was 12.9 and 39.2 on 2025 but might have been hemoconcentrated at that visit.  She has had 2 previous C-sections. She denies smoking cigarettes. She is accompanied by her significant other Malik Snow.         She denies any personal or family history of aneuploidy or anomalies. She denies any exposure to high fevers, viral rashes, illicit drugs or alcohol in this pregnancy.  She denies any leaking fluid, vaginal bleeding, contractions, decreased fetal movement. Denies headaches, visual disturbances, or epigastric pain.       Pregnancy complications include:  Problem List[1]    Past Medical History:   Diagnosis Date    Anxiety disorder, unspecified     not currently    Asthma     Dormant    H. pylori infection     Marijuana user 2023    Vitamin D deficiency        Past Surgical History:   Procedure Laterality Date     SECTION N/A 2023    Procedure:  SECTION;  Surgeon: Davide Hood MD;  Location: [facility] OR;  Service: OB/GYN;  Laterality: N/A;     SECTION N/A 2024    Procedure:  SECTION;  Surgeon: Davide Hood MD;  Location: [facility] OR;  Service: OB/GYN;  Laterality: N/A;    CHOLECYSTECTOMY      EXCISION OF VERRUCA  "Left 01/15/2025    Procedure: EXCISION, WART;  Surgeon: Thomas Bennett MD;  Location: [facility] OR;  Service: General;  Laterality: Left;  Area of excision was 1 -2 cm    WISDOM TOOTH EXTRACTION      All four were removed       Family History   Problem Relation Name Age of Onset    No Known Problems Paternal Grandfather      No Known Problems Paternal Grandmother      No Known Problems Maternal Grandmother      No Known Problems Maternal Grandfather      No Known Problems Father      No Known Problems Mother      No Known Problems Brother      No Known Problems Sister      Breast cancer Neg Hx      Cervical cancer Neg Hx      Uterine cancer Neg Hx      Ovarian cancer Neg Hx      Colon cancer Neg Hx         Social History     Socioeconomic History    Marital status: Significant Other   Tobacco Use    Smoking status: Former     Types: Cigarettes     Start date: 2024     Quit date: 2016     Years since quittin.5     Passive exposure: Current    Smokeless tobacco: Never    Tobacco comments:     3/21/23 per pt "I smoke  only 1-3 cigarettes a day."     Recently quit   Substance and Sexual Activity    Alcohol use: Not Currently    Drug use: Not Currently     Types: Marijuana    Sexual activity: Not Currently     Partners: Male     Birth control/protection: None     Social Drivers of Health     Financial Resource Strain: Low Risk  (2024)    Overall Financial Resource Strain (CARDIA)     Difficulty of Paying Living Expenses: Not hard at all   Food Insecurity: No Food Insecurity (2024)    Hunger Vital Sign     Worried About Running Out of Food in the Last Year: Never true     Ran Out of Food in the Last Year: Never true   Transportation Needs: No Transportation Needs (2024)    TRANSPORTATION NEEDS     Transportation : No   Physical Activity: Inactive (2024)    Exercise Vital Sign     Days of Exercise per Week: 0 days     Minutes of Exercise per Session: 0 min   Stress: No Stress Concern " "Present (5/22/2024)    [employer] - Occupational Stress Questionnaire     Feeling of Stress : Not at all   Housing Stability: Unknown (5/22/2024)    Housing Stability Vital Sign     Unable to Pay for Housing in the Last Year: No     Homeless in the Last Year: No       Current Medications[2]    Review of patient's allergies indicates:   Allergen Reactions    Iodinated contrast media Shortness Of Breath    Iodine Rash and Swelling    Shrimp         Review of Systems   12 point review of systems conducted, negative except as stated in the history of present illness. See HPI for details.      Objective:     Visit Vitals  /60 (BP Location: Left arm, Patient Position: Sitting)   Pulse 68   Ht 5' 4" (1.626 m)   Wt 59.2 kg (130 lb 9.6 oz)   LMP 01/13/2025   BMI 22.42 kg/m²        Physical Exam  Vitals and nursing note reviewed.   Constitutional:       General: She is not in acute distress.     Appearance: Normal appearance.   HENT:      Head: Normocephalic and atraumatic.      Nose: Nose normal. No congestion.      Mouth/Throat:      Pharynx: Oropharynx is clear.   Eyes:      General: No scleral icterus.     Conjunctiva/sclera: Conjunctivae normal.   Cardiovascular:      Rate and Rhythm: Normal rate and regular rhythm.   Pulmonary:      Effort: No respiratory distress.      Breath sounds: Normal breath sounds. No wheezing.   Abdominal:      General: Abdomen is flat.      Palpations: Abdomen is soft.      Tenderness: There is no abdominal tenderness. There is no right CVA tenderness, left CVA tenderness or guarding.      Comments: No CVA tenderness gravid uterus.    Musculoskeletal:         General: Normal range of motion.      Cervical back: Neck supple.      Right lower leg: No edema.      Left lower leg: No edema.   Skin:     General: Skin is warm.      Findings: No bruising or rash.   Neurological:      General: No focal deficit present.      Mental Status: She is oriented to person, " place, and time.      Deep Tendon Reflexes: Reflexes normal.      Comments: Normal reflexes   Psychiatric:         Mood and Affect: Mood normal.         Behavior: Behavior normal.         Thought Content: Thought content normal.         Judgment: Judgment normal.         Assessment/Plan:     29 y.o.  female with IUP at 16w3d    Subchorionic hemorrhage with recent vaginal bleeding  Viable IUP with measurements consistent with established dating on 25.    ARLEEN in YOLIS measuring 4.8 x 6.0 x 5.3cm on 25.    She was advised that most patients with this do well in pregnancy. However, there is higher association of bleeding in later pregnancy as well as pregnancy loss (if less than 24 weeks) PPROM,  labor and delivery, especially in patient with bleeding episodes rather than asymptomatic subchorionic hemorrhage.  There is no prevention available and advised of need for pelvic rest x 2 weeks from last bleeding episode with expectant management will be done and advised to report any bleeding or increased cramps mainly in the second half of the pregnancy. Bed rest is not known to prolong the pregnancy.     Viable IUP is seen today.  Advised to report any recurrence of vaginal bleeding, and expectant management is to be done.  We will plan to do a level 2 ultrasound around 20 weeks.         Previous  x2  Discussed risks with previous  section, including improper placental implantation and abnormalities such as accreta, placental abruption, and risks of uterine rupture with labor with need for emergency  section, and discussed  morbidity/mortality risks associated with that.     With previous  section, there is risk for placenta accreta that may not be detected antenatally. There is option to consider MRI if suspicion for placental abnormality. If no suspicion for placental abnormality, no MRI will be recommended and will do expectant management, understanding that  neither the MRI nor the US is 100% sensitive or specific. Furthermore, most current data, suggest that an MRI is not superior to US, and should not be routinely done if suspected accreta, as both false positives and false negatives are increased with such an approach.    With repeat cesareans, standard of care is to deliver at 39 weeks, if there are no other obstetric risk factors for earlier delivery. She is to report any significant cramping or vaginal bleeding.       With history of multiple c-sections, advised to consider avoiding future pregnancy with risks as above. She verbalized understanding.      Counseled the patient on association of subchorionic hemorrhage with the miscarriage risk, premature rupture of membranes and  labor and delivery.  The fact that there has been no active bleeding over 2 weeks delivery reassuring although the risk of such adverse outcomes is still present particularly with such relatively larger size subchorionic hemorrhage seen on ultrasound with the inflammation associated with the it.  Expectant management.  There is no treatment available.  We will plan to see her again in 4 weeks and do a vaginal ultrasound at this time.  Ordered a CBC.  If hemoglobin hematocrit is low hematinic therapy supplementation she will be given.  Her hemoglobin hematocrit was 12.9 and 39.2 on 2025 but might have been hemoconcentrated at that visit.    Follow up in about 4 weeks (around 2025) for MFM follow-up, Level 2 scan.     Future Appointments   Date Time Provider Department Center   7/15/2025  8:10 AM Davide Hood MD [facility]   2025  8:50 AM LAB, [facility] LABORATORY DRAW STATION [facility] Jenniffer [facility]   2025 11:00 AM Yann Norris APRN [facility]        Patient was evaluated and examined by Dr. Urbina. FADI Tan, helped in pre charting of part of note.    This note was created with the assistance of [employer] voice recognition software. There  may be transcription errors as a result of using this technology, however minimal. Effort has been made to ensure accuracy of transcription, but any obvious errors or omissions should be clarified with the author of the document.           [1]   Patient Active Problem List  Diagnosis    Asthma    Mild depression    Marijuana use during pregnancy    Previous  delivery affecting pregnancy    Nausea    Subchorionic hematoma in second trimester    Vaginal bleeding affecting early pregnancy   [2]   Current Outpatient Medications   Medication Sig Dispense Refill    PNV no.153/FA/om3/dha/epa/fish (PRENATAL GUMMIES ORAL) Take by mouth. Take two gummies daily.       No current facility-administered medications for this visit.

## 2025-07-02 ENCOUNTER — PROCEDURE VISIT (OUTPATIENT)
Dept: MATERNAL FETAL MEDICINE | Facility: CLINIC | Age: 30
End: 2025-07-02
Payer: MEDICAID

## 2025-07-02 ENCOUNTER — OFFICE VISIT (OUTPATIENT)
Dept: MATERNAL FETAL MEDICINE | Facility: CLINIC | Age: 30
End: 2025-07-02
Payer: MEDICAID

## 2025-07-02 VITALS
DIASTOLIC BLOOD PRESSURE: 60 MMHG | SYSTOLIC BLOOD PRESSURE: 100 MMHG | BODY MASS INDEX: 22.3 KG/M2 | WEIGHT: 130.63 LBS | HEIGHT: 64 IN | HEART RATE: 68 BPM

## 2025-07-02 DIAGNOSIS — O41.8X20 SUBCHORIONIC HEMATOMA IN SECOND TRIMESTER: ICD-10-CM

## 2025-07-02 DIAGNOSIS — O46.8X2 SUBCHORIONIC HEMATOMA IN SECOND TRIMESTER: ICD-10-CM

## 2025-07-02 DIAGNOSIS — O20.9 VAGINAL BLEEDING AFFECTING EARLY PREGNANCY: ICD-10-CM

## 2025-07-02 DIAGNOSIS — O46.8X2 SUBCHORIONIC HEMATOMA IN SECOND TRIMESTER, SINGLE OR UNSPECIFIED FETUS: Primary | ICD-10-CM

## 2025-07-02 DIAGNOSIS — Z36.89 ENCOUNTER FOR FETAL ANATOMIC SURVEY: ICD-10-CM

## 2025-07-02 DIAGNOSIS — O34.219 PREVIOUS CESAREAN DELIVERY AFFECTING PREGNANCY: ICD-10-CM

## 2025-07-02 DIAGNOSIS — O41.8X20 SUBCHORIONIC HEMATOMA IN SECOND TRIMESTER, SINGLE OR UNSPECIFIED FETUS: Primary | ICD-10-CM

## 2025-07-06 ENCOUNTER — PATIENT MESSAGE (OUTPATIENT)
Dept: OTHER | Facility: OTHER | Age: 30
End: 2025-07-06
Payer: MEDICAID

## 2025-07-08 NOTE — PROGRESS NOTES
HPI  29 y.o.  at 18w2d  here for OB check.  C/o headache.   Denies vaginal bleeding x2 weeks.       ROS  Review of Systems   Constitutional:  Negative for chills and fever.   Gastrointestinal:  Negative for abdominal pain, constipation and diarrhea.   Genitourinary:  Negative for flank pain, genital sores, pelvic pain, urgency, vaginal bleeding, vaginal discharge, vaginal pain, postcoital bleeding and vaginal odor.         OBJECTIVE  /68   Wt 59 kg (130 lb)   LMP 2025   BMI 22.31 kg/m²     Gen: No distress  Abdomen: Gravid, non-tender  Extremities: No edema    FHT: 145        ASSESSMENT  1. Subchorionic hematoma in second trimester, single or unspecified fetus    2. 18 weeks gestation of pregnancy  - POCT Urinalysis, Dipstick, Automated, W/O Scope    3. Marijuana use during pregnancy    4. Previous  delivery affecting pregnancy        PLAN  Reviewed standard labor unit and kick count precautions.  Discussed pre-eclampsia precautions.  Discussed COVID-19 risks, social distancing, and isolation if respiratory symptoms develop.     MFM note 25  Viable IUP with measurements consistent with established dating on 25.    ARLEEN in YOLIS measuring 4.8 x 6.0 x 5.3cm on 25.  Viable IUP is seen today. Advised to report any recurrence of vaginal bleeding, and expectant management is to be done. We will plan to do a level 2 ultrasound around 20 weeks.   We will plan to see her again in 4 weeks and do a vaginal ultrasound at this time. Ordered a CBC. If hemoglobin hematocrit is low hematinic therapy supplementation she will be given. Her hemoglobin hematocrit was 12.9 and 39.2 on 2025 but might have been hemoconcentrated at that visit.   Keep MFM appt     Continue pelvic rest, avoidance of intercourse, strenuous activity. Bleeding precautions     Tylenol 650mg PO q 6 hours prn headache    RTC 4 weeks     This note was transcribed by Obdulia Del Angel. There may be transcription errors as a  result, however minimal. Effort has been made to ensure accuracy of transcription, but any obvious errors or omissions should be clarified with the author of the document.       I agree with the above documentation.

## 2025-07-15 ENCOUNTER — ROUTINE PRENATAL (OUTPATIENT)
Dept: OBSTETRICS AND GYNECOLOGY | Facility: CLINIC | Age: 30
End: 2025-07-15
Payer: MEDICAID

## 2025-07-15 VITALS — BODY MASS INDEX: 22.31 KG/M2 | DIASTOLIC BLOOD PRESSURE: 68 MMHG | WEIGHT: 130 LBS | SYSTOLIC BLOOD PRESSURE: 106 MMHG

## 2025-07-15 DIAGNOSIS — Z36.89 ENCOUNTER FOR FETAL ANATOMIC SURVEY: Primary | ICD-10-CM

## 2025-07-15 DIAGNOSIS — O99.513 ASTHMA AFFECTING PREGNANCY IN THIRD TRIMESTER: ICD-10-CM

## 2025-07-15 DIAGNOSIS — J45.909 ASTHMA AFFECTING PREGNANCY IN THIRD TRIMESTER: ICD-10-CM

## 2025-07-15 DIAGNOSIS — O99.013 ANEMIA DURING PREGNANCY IN THIRD TRIMESTER: ICD-10-CM

## 2025-07-15 DIAGNOSIS — O41.8X20 SUBCHORIONIC HEMATOMA IN SECOND TRIMESTER, SINGLE OR UNSPECIFIED FETUS: Primary | ICD-10-CM

## 2025-07-15 DIAGNOSIS — O34.219 PREVIOUS CESAREAN DELIVERY AFFECTING PREGNANCY: ICD-10-CM

## 2025-07-15 DIAGNOSIS — O99.320 MARIJUANA USE DURING PREGNANCY: ICD-10-CM

## 2025-07-15 DIAGNOSIS — F12.90 MARIJUANA USER: ICD-10-CM

## 2025-07-15 DIAGNOSIS — O46.8X2 SUBCHORIONIC HEMATOMA IN SECOND TRIMESTER, SINGLE OR UNSPECIFIED FETUS: Primary | ICD-10-CM

## 2025-07-15 DIAGNOSIS — F12.90 MARIJUANA USE DURING PREGNANCY: ICD-10-CM

## 2025-07-15 DIAGNOSIS — O20.9 VAGINAL BLEEDING AFFECTING EARLY PREGNANCY: ICD-10-CM

## 2025-07-15 DIAGNOSIS — O36.5920 POOR FETAL GROWTH AFFECTING MANAGEMENT OF MOTHER IN SECOND TRIMESTER, SINGLE OR UNSPECIFIED FETUS: ICD-10-CM

## 2025-07-15 DIAGNOSIS — Z3A.18 18 WEEKS GESTATION OF PREGNANCY: ICD-10-CM

## 2025-07-27 ENCOUNTER — PATIENT MESSAGE (OUTPATIENT)
Dept: OTHER | Facility: OTHER | Age: 30
End: 2025-07-27
Payer: MEDICAID

## 2025-07-29 ENCOUNTER — LAB VISIT (OUTPATIENT)
Dept: LAB | Facility: HOSPITAL | Age: 30
End: 2025-07-29
Attending: OBSTETRICS & GYNECOLOGY
Payer: MEDICAID

## 2025-07-29 DIAGNOSIS — O20.9 VAGINAL BLEEDING AFFECTING EARLY PREGNANCY: ICD-10-CM

## 2025-07-29 DIAGNOSIS — O46.8X2 SUBCHORIONIC HEMATOMA IN SECOND TRIMESTER, SINGLE OR UNSPECIFIED FETUS: ICD-10-CM

## 2025-07-29 DIAGNOSIS — O41.8X20 SUBCHORIONIC HEMATOMA IN SECOND TRIMESTER, SINGLE OR UNSPECIFIED FETUS: ICD-10-CM

## 2025-07-29 LAB
ERYTHROCYTE [DISTWIDTH] IN BLOOD BY AUTOMATED COUNT: 14.1 % (ref 11–14.5)
HCT VFR BLD AUTO: 31.8 % (ref 36–48)
HGB BLD-MCNC: 10.6 G/DL (ref 11.8–16)
MCH RBC QN AUTO: 29.6 PG (ref 27–34)
MCHC RBC AUTO-ENTMCNC: 33.3 G/DL (ref 31–37)
MCV RBC AUTO: 88.8 FL (ref 79–99)
NRBC BLD AUTO-RTO: 0 %
PLATELET # BLD AUTO: 270 X10(3)/MCL (ref 140–371)
PMV BLD AUTO: 9.6 FL (ref 9.4–12.4)
RBC # BLD AUTO: 3.58 X10(6)/MCL (ref 4–5.1)
WBC # BLD AUTO: 11.33 X10(3)/MCL (ref 4–11.5)

## 2025-07-29 PROCEDURE — 85027 COMPLETE CBC AUTOMATED: CPT

## 2025-07-29 PROCEDURE — 36415 COLL VENOUS BLD VENIPUNCTURE: CPT

## 2025-07-30 ENCOUNTER — TELEPHONE (OUTPATIENT)
Dept: MATERNAL FETAL MEDICINE | Facility: CLINIC | Age: 30
End: 2025-07-30
Payer: MEDICAID

## 2025-07-30 NOTE — PROGRESS NOTES
"  Maternal Fetal Medicine Follow Up    Subjective:     Patient ID: [id number]    Chief Complaint: [facility] followup w/us      HPI: Jamia Smith is a 29 y.o. female  at 20w4d gestation with Estimated Date of Delivery: 25  who is here for follow-up consultation by [facility].    She has known subchorionic hemorrhage.  She has had intermittent vaginal bleeding with the last bleeding episode around .. Her hemoglobin hematocrit was 12.9 and 39.2 on 2025 but might have been hemoconcentrated at that visit.  Follow-up CBC on 2025 showed a anemia with H&H 10.6/31.8. She has had 2 previous C-sections.       Interval history since last [facility] visit: None.. She denies any leaking fluid, vaginal bleeding, contractions, decreased fetal movement. Denies headaches, visual disturbances, or epigastric pain.    Pregnancy complications include:   Problem List[1]    No changes to medical, surgical, family, social, or obstetric history.    Medications:  Current Outpatient Medications   Medication Instructions    ascorbic acid (vitamin C) (VITAMIN C) 250 mg, Oral, Every other day    ferrous sulfate (IRON) 325 mg, Oral, Every other day    folic acid (FOLVITE) 1 mg, Oral, Daily    PNV no.153/FA/om3/dha/epa/fish (PRENATAL GUMMIES ORAL) Take by mouth. Take two gummies daily.       Review of Systems   12 point review of systems conducted, negative except as stated in the history of present illness. See HPI for details.      Objective:     Visit Vitals  BP 99/62 (BP Location: Left arm, Patient Position: Sitting)   Pulse 71   Ht 5' 4" (1.626 m)   Wt 62.4 kg (137 lb 8 oz)   LMP 2025   BMI 23.60 kg/m²        Physical Exam  Vitals and nursing note reviewed.   Constitutional:       Appearance: Normal appearance.   HENT:      Head: Normocephalic and atraumatic.      Nose: Nose normal. No congestion.   Cardiovascular:      Rate and Rhythm: Normal rate.   Pulmonary:      Effort: Pulmonary effort is normal.   Skin:     Findings: " No rash.   Neurological:      Mental Status: She is alert and oriented to person, place, and time.   Psychiatric:         Mood and Affect: Mood normal.         Behavior: Behavior normal.         Thought Content: Thought content normal.         Judgment: Judgment normal.         Assessment/Plan:     29 y.o.  female with IUP at 20w4d    Subchorionic hemorrhage with recent vaginal bleeding  There is normal fetal growth and no anomalies seen on fetal anatomy scan on 2025.  AFV is normal.   ARLEEN in YOLIS measuring 6.2 x 1.9 cm on 2025, slightly smaller than previous assessment.    There is no prevention available and reviewed need for pelvic rest x 2 weeks from last bleeding episode with expectant management will be done and advised to report any bleeding or increased cramps mainly in the second half of the pregnancy.     We will plan to see patient back in 10 weeks to recheck fetal growth       Previous  x2  She is aware of risks with previous  section, including improper placental implantation and abnormalities such as accreta, placental abruption and risks of uterine rupture with labor with need for emergency  section, and  morbidity/mortality risks associated with that.     With repeat cesareans, standard of care is to deliver at 39 weeks, if there are no other obstetric risk factors for earlier delivery. She is to report any significant cramping or vaginal bleeding.      With history of multiple c-sections, previously advised to consider avoiding future pregnancy with risks as above.        Anemia  The World Health Organization (WHO) defines anemia as a hemoglobin level <11 g/dL (approximately equivalent to a hematocrit <33 percent) in the first trimester, <10.5 g/dL in the second trimester, <10.5 to 11 g/dL in the third trimester, or <10 g/dL postpartum. Anemia affects approximately 30 percent of reproductive-age females and 40 percent of pregnant individuals, mostly  due to iron deficiency.       Physiologic anemia of pregnancy and iron deficiency are the two most common causes of anemia in pregnancy. Much less common causes of anemia include hemoglobinopathies (sickle cell, thalassemia), RBC membrane disorders (hereditary spherocytosis and had hereditary Elliptocytosis), and acquired anemias (folate deficiency, vitamin B12 deficiency, other vitamin deficiencies, autoimmune hemolytic anemia Anemia, hypothyroidism and chronic kidney disease). All gravidas with anemia or symptoms of anemia should have prompt testing for iron deficiency because iron deficiency can progress to anemia; iron deficiency is the most common pathologic cause of anemia in pregnancy.    I discussed with her that iron deficiency during the first two trimesters of pregnancy is associated with a 2-fold increased risk for  delivery and a 3-fold increased risk for delivering a low-birth-weight baby.    We will start the patient oral hematinic therapy with ferrous sulfate 325 mg every other day, vitamin-C 250 mg every other day, and folic acid 1 mg every other day.  Recommend intermittent CBC throughout pregnancy to assess response to therapy.     There is normal fetal growth with no anomalies.  Reminded the patient of the risk of ruptured membranes, placental abruption,  labor and delivery.  Expectant management.  Cervical length is reassuring at this time.  Advised her to report back if she has cramps or recurrence of any active bleeding.  Hematinic therapy was started.    Follow up in about 10 weeks (around 10/9/2025) for MFM follow-up, Repeat  ultrasound.     Future Appointments   Date Time Provider Department Center   2025  1:50 PM Davide Hood MD [facility]   2025  8:50 AM LAB, [facility] LABORATORY DRAW STATION [facility] DOROTHY Guajardo   2025 11:00 AM Yann Norris APRN [facility] KEZIA [facility]   10/8/2025  9:00 AM ROOM 2, [facility]   10/8/2025   9:30 AM Vishnu Urbina MD [facility] Nafisa RODRIGUEZ        GERA involvement: Patient was evaluated and examined by Dr. Urbina. FADI Tan, helped in pre charting of part of note.    This note was created with the assistance of [employer] voice recognition software. There may be transcription errors as a result of using this technology, however minimal. Effort has been made to ensure accuracy of transcription, but any obvious errors or omissions should be clarified with the author of the document.             [1]   Patient Active Problem List  Diagnosis    Asthma    Mild depression    Marijuana use during pregnancy    Previous  delivery affecting pregnancy    Anemia affecting pregnancy in second trimester    Nausea    Subchorionic hematoma in second trimester    Vaginal bleeding affecting early pregnancy

## 2025-07-31 ENCOUNTER — OFFICE VISIT (OUTPATIENT)
Dept: MATERNAL FETAL MEDICINE | Facility: CLINIC | Age: 30
End: 2025-07-31
Payer: MEDICAID

## 2025-07-31 ENCOUNTER — PROCEDURE VISIT (OUTPATIENT)
Dept: MATERNAL FETAL MEDICINE | Facility: CLINIC | Age: 30
End: 2025-07-31
Payer: MEDICAID

## 2025-07-31 VITALS
DIASTOLIC BLOOD PRESSURE: 62 MMHG | SYSTOLIC BLOOD PRESSURE: 99 MMHG | HEART RATE: 71 BPM | BODY MASS INDEX: 23.47 KG/M2 | HEIGHT: 64 IN | WEIGHT: 137.5 LBS

## 2025-07-31 DIAGNOSIS — O99.013 ANEMIA DURING PREGNANCY IN THIRD TRIMESTER: ICD-10-CM

## 2025-07-31 DIAGNOSIS — Z36.89 ENCOUNTER FOR FETAL ANATOMIC SURVEY: ICD-10-CM

## 2025-07-31 DIAGNOSIS — O46.8X2 SUBCHORIONIC HEMATOMA IN SECOND TRIMESTER, SINGLE OR UNSPECIFIED FETUS: Primary | ICD-10-CM

## 2025-07-31 DIAGNOSIS — O20.9 VAGINAL BLEEDING AFFECTING EARLY PREGNANCY: ICD-10-CM

## 2025-07-31 DIAGNOSIS — O34.219 PREVIOUS CESAREAN DELIVERY AFFECTING PREGNANCY: ICD-10-CM

## 2025-07-31 DIAGNOSIS — O36.5920 POOR FETAL GROWTH AFFECTING MANAGEMENT OF MOTHER IN SECOND TRIMESTER, SINGLE OR UNSPECIFIED FETUS: ICD-10-CM

## 2025-07-31 DIAGNOSIS — J45.909 ASTHMA AFFECTING PREGNANCY IN THIRD TRIMESTER: ICD-10-CM

## 2025-07-31 DIAGNOSIS — F12.90 MARIJUANA USER: ICD-10-CM

## 2025-07-31 DIAGNOSIS — O41.8X20 SUBCHORIONIC HEMATOMA IN SECOND TRIMESTER, SINGLE OR UNSPECIFIED FETUS: Primary | ICD-10-CM

## 2025-07-31 DIAGNOSIS — O99.012 ANEMIA AFFECTING PREGNANCY IN SECOND TRIMESTER: ICD-10-CM

## 2025-07-31 DIAGNOSIS — O99.513 ASTHMA AFFECTING PREGNANCY IN THIRD TRIMESTER: ICD-10-CM

## 2025-07-31 RX ORDER — ASCORBIC ACID 250 MG
250 TABLET ORAL EVERY OTHER DAY
Qty: 20 TABLET | Refills: 4 | Status: SHIPPED | OUTPATIENT
Start: 2025-07-31

## 2025-07-31 RX ORDER — FOLIC ACID 1 MG/1
1 TABLET ORAL DAILY
Qty: 30 TABLET | Refills: 4 | Status: SHIPPED | OUTPATIENT
Start: 2025-07-31

## 2025-07-31 RX ORDER — FERROUS SULFATE 325(65) MG
325 TABLET ORAL EVERY OTHER DAY
Qty: 20 TABLET | Refills: 4 | Status: SHIPPED | OUTPATIENT
Start: 2025-07-31

## 2025-08-11 ENCOUNTER — ROUTINE PRENATAL (OUTPATIENT)
Dept: OBSTETRICS AND GYNECOLOGY | Facility: CLINIC | Age: 30
End: 2025-08-11
Payer: MEDICAID

## 2025-08-11 VITALS
DIASTOLIC BLOOD PRESSURE: 62 MMHG | HEIGHT: 64 IN | BODY MASS INDEX: 23.9 KG/M2 | WEIGHT: 140 LBS | SYSTOLIC BLOOD PRESSURE: 116 MMHG

## 2025-08-11 DIAGNOSIS — O46.8X2 SUBCHORIONIC HEMATOMA IN SECOND TRIMESTER, SINGLE OR UNSPECIFIED FETUS: Primary | ICD-10-CM

## 2025-08-11 DIAGNOSIS — F12.90 MARIJUANA USE DURING PREGNANCY: ICD-10-CM

## 2025-08-11 DIAGNOSIS — Z3A.22 22 WEEKS GESTATION OF PREGNANCY: ICD-10-CM

## 2025-08-11 DIAGNOSIS — O34.219 PREVIOUS CESAREAN DELIVERY AFFECTING PREGNANCY: ICD-10-CM

## 2025-08-11 DIAGNOSIS — O41.8X20 SUBCHORIONIC HEMATOMA IN SECOND TRIMESTER, SINGLE OR UNSPECIFIED FETUS: Primary | ICD-10-CM

## 2025-08-11 DIAGNOSIS — O99.320 MARIJUANA USE DURING PREGNANCY: ICD-10-CM

## 2025-08-11 DIAGNOSIS — O99.012 ANEMIA AFFECTING PREGNANCY IN SECOND TRIMESTER: ICD-10-CM

## 2025-08-20 ENCOUNTER — ROUTINE PRENATAL (OUTPATIENT)
Dept: OBSTETRICS AND GYNECOLOGY | Facility: CLINIC | Age: 30
End: 2025-08-20
Payer: MEDICAID

## 2025-08-20 VITALS
WEIGHT: 140.63 LBS | SYSTOLIC BLOOD PRESSURE: 124 MMHG | BODY MASS INDEX: 24.13 KG/M2 | DIASTOLIC BLOOD PRESSURE: 76 MMHG

## 2025-08-20 DIAGNOSIS — Z91.81 STATUS POST FALL: ICD-10-CM

## 2025-08-20 DIAGNOSIS — R30.0 BURNING WITH URINATION: ICD-10-CM

## 2025-08-20 DIAGNOSIS — R10.9 ABDOMINAL PAIN, UNSPECIFIED ABDOMINAL LOCATION: Primary | ICD-10-CM

## 2025-08-20 DIAGNOSIS — Z3A.23 23 WEEKS GESTATION OF PREGNANCY: ICD-10-CM

## 2025-08-20 LAB
BILIRUB UR QL STRIP: NEGATIVE
GLUCOSE UR QL STRIP: NEGATIVE
KETONES UR QL STRIP: POSITIVE
LEUKOCYTE ESTERASE UR QL STRIP: POSITIVE
PH, POC UA: 7
POC BLOOD, URINE: POSITIVE
POC NITRATES, URINE: NEGATIVE
PROT UR QL STRIP: POSITIVE
SP GR UR STRIP: 1.02 (ref 1–1.03)
UROBILINOGEN UR STRIP-ACNC: 1 (ref 0.1–1.1)

## 2025-08-20 PROCEDURE — 87086 URINE CULTURE/COLONY COUNT: CPT | Performed by: OBSTETRICS & GYNECOLOGY

## 2025-08-20 RX ORDER — NITROFURANTOIN 25; 75 MG/1; MG/1
100 CAPSULE ORAL 2 TIMES DAILY
Qty: 14 CAPSULE | Refills: 0 | Status: SHIPPED | OUTPATIENT
Start: 2025-08-20 | End: 2025-08-27

## 2025-08-23 LAB — BACTERIA UR CULT: ABNORMAL

## 2025-08-24 ENCOUNTER — PATIENT MESSAGE (OUTPATIENT)
Dept: OTHER | Facility: OTHER | Age: 30
End: 2025-08-24
Payer: MEDICAID

## 2025-08-25 ENCOUNTER — HOSPITAL ENCOUNTER (OUTPATIENT)
Facility: HOSPITAL | Age: 30
Discharge: HOME | End: 2025-08-27
Attending: EMERGENCY MEDICINE | Admitting: OBSTETRICS & GYNECOLOGY
Payer: MEDICAID

## 2025-08-25 DIAGNOSIS — Z16.12 ESBL (EXTENDED SPECTRUM BETA-LACTAMASE) PRODUCING BACTERIA INFECTION: ICD-10-CM

## 2025-08-25 DIAGNOSIS — A49.9 ESBL (EXTENDED SPECTRUM BETA-LACTAMASE) PRODUCING BACTERIA INFECTION: ICD-10-CM

## 2025-08-25 DIAGNOSIS — N12 PYELONEPHRITIS: Primary | ICD-10-CM

## 2025-08-25 LAB
ALBUMIN SERPL-MCNC: 2.7 G/DL (ref 3.5–5)
ALBUMIN/GLOB SERPL: 0.8 RATIO (ref 1.1–2)
ALP SERPL-CCNC: 169 UNIT/L (ref 40–150)
ALT SERPL-CCNC: 5 UNIT/L (ref 0–55)
AMORPH PHOS CRY URNS QL MICRO: ABNORMAL /HPF
ANION GAP SERPL CALC-SCNC: 9 MEQ/L
AST SERPL-CCNC: 12 UNIT/L (ref 11–45)
BACTERIA #/AREA URNS AUTO: ABNORMAL /HPF
BASOPHILS # BLD AUTO: 0.03 X10(3)/MCL (ref 0.01–0.08)
BASOPHILS NFR BLD AUTO: 0.3 % (ref 0.1–1.2)
BILIRUB SERPL-MCNC: 0.3 MG/DL
BILIRUB UR QL STRIP.AUTO: NEGATIVE
BUN SERPL-MCNC: 10 MG/DL (ref 7–18.7)
CALCIUM SERPL-MCNC: 8.4 MG/DL (ref 8.4–10.2)
CHLORIDE SERPL-SCNC: 107 MMOL/L (ref 98–107)
CLARITY UR: ABNORMAL
CO2 SERPL-SCNC: 19 MMOL/L (ref 22–29)
COLOR UR AUTO: YELLOW
CREAT SERPL-MCNC: 0.54 MG/DL (ref 0.55–1.02)
CREAT/UREA NIT SERPL: 19
EOSINOPHIL # BLD AUTO: 0.07 X10(3)/MCL (ref 0.04–0.36)
EOSINOPHIL NFR BLD AUTO: 0.6 % (ref 0.7–7)
ERYTHROCYTE [DISTWIDTH] IN BLOOD BY AUTOMATED COUNT: 13.3 % (ref 11–14.5)
FLUAV AG UPPER RESP QL IA.RAPID: NOT DETECTED
FLUBV AG UPPER RESP QL IA.RAPID: NOT DETECTED
GFR SERPLBLD CREATININE-BSD FMLA CKD-EPI: >90 ML/MIN/1.73/M2
GLOBULIN SER-MCNC: 3.2 GM/DL (ref 2.4–3.5)
GLUCOSE SERPL-MCNC: 89 MG/DL (ref 74–100)
GLUCOSE UR QL STRIP: NEGATIVE
HCT VFR BLD AUTO: 31.2 % (ref 36–48)
HGB BLD-MCNC: 10.1 G/DL (ref 11.8–16)
HGB UR QL STRIP: NEGATIVE
IMM GRANULOCYTES # BLD AUTO: 0.05 X10(3)/MCL (ref 0–0.03)
IMM GRANULOCYTES NFR BLD AUTO: 0.4 % (ref 0–0.5)
KETONES UR QL STRIP: NEGATIVE
LEUKOCYTE ESTERASE UR QL STRIP: ABNORMAL
LIPASE SERPL-CCNC: 24 U/L
LYMPHOCYTES # BLD AUTO: 1.08 X10(3)/MCL (ref 1.16–3.74)
LYMPHOCYTES NFR BLD AUTO: 9.7 % (ref 20–55)
MCH RBC QN AUTO: 28.8 PG (ref 27–34)
MCHC RBC AUTO-ENTMCNC: 32.4 G/DL (ref 31–37)
MCV RBC AUTO: 88.9 FL (ref 79–99)
MONOCYTES # BLD AUTO: 0.92 X10(3)/MCL (ref 0.24–0.36)
MONOCYTES NFR BLD AUTO: 8.2 % (ref 4.7–12.5)
NEUTROPHILS # BLD AUTO: 9.04 X10(3)/MCL (ref 1.56–6.13)
NEUTROPHILS NFR BLD AUTO: 80.8 % (ref 37–73)
NITRITE UR QL STRIP: NEGATIVE
NRBC BLD AUTO-RTO: 0 %
PH UR STRIP: 7.5 [PH]
PLATELET # BLD AUTO: 267 X10(3)/MCL (ref 140–371)
PMV BLD AUTO: 10.2 FL (ref 9.4–12.4)
POTASSIUM SERPL-SCNC: 3.4 MMOL/L (ref 3.5–5.1)
PROT SERPL-MCNC: 5.9 GM/DL (ref 6.4–8.3)
PROT UR QL STRIP: NEGATIVE
RBC # BLD AUTO: 3.51 X10(6)/MCL (ref 4–5.1)
RBC #/AREA URNS AUTO: ABNORMAL /HPF
RSV A 5' UTR RNA NPH QL NAA+PROBE: NOT DETECTED
SARS-COV-2 RNA RESP QL NAA+PROBE: NOT DETECTED
SODIUM SERPL-SCNC: 135 MMOL/L (ref 136–145)
SP GR UR STRIP.AUTO: 1.02 (ref 1–1.03)
SQUAMOUS #/AREA URNS AUTO: ABNORMAL /HPF
UROBILINOGEN UR STRIP-ACNC: 1
WBC # BLD AUTO: 11.19 X10(3)/MCL (ref 4–11.5)
WBC #/AREA URNS AUTO: ABNORMAL /HPF

## 2025-08-25 PROCEDURE — 96375 TX/PRO/DX INJ NEW DRUG ADDON: CPT

## 2025-08-25 PROCEDURE — 63600175 PHARM REV CODE 636 W HCPCS: Performed by: EMERGENCY MEDICINE

## 2025-08-25 PROCEDURE — 96361 HYDRATE IV INFUSION ADD-ON: CPT

## 2025-08-25 PROCEDURE — 87637 SARSCOV2&INF A&B&RSV AMP PRB: CPT | Performed by: EMERGENCY MEDICINE

## 2025-08-25 PROCEDURE — G0378 HOSPITAL OBSERVATION PER HR: HCPCS

## 2025-08-25 PROCEDURE — 87077 CULTURE AEROBIC IDENTIFY: CPT | Performed by: EMERGENCY MEDICINE

## 2025-08-25 PROCEDURE — 81003 URINALYSIS AUTO W/O SCOPE: CPT | Performed by: EMERGENCY MEDICINE

## 2025-08-25 PROCEDURE — 96374 THER/PROPH/DIAG INJ IV PUSH: CPT

## 2025-08-25 PROCEDURE — 80053 COMPREHEN METABOLIC PANEL: CPT | Performed by: EMERGENCY MEDICINE

## 2025-08-25 PROCEDURE — 81015 MICROSCOPIC EXAM OF URINE: CPT | Mod: XB | Performed by: EMERGENCY MEDICINE

## 2025-08-25 PROCEDURE — 83690 ASSAY OF LIPASE: CPT | Performed by: EMERGENCY MEDICINE

## 2025-08-25 PROCEDURE — 85025 COMPLETE CBC W/AUTO DIFF WBC: CPT | Performed by: EMERGENCY MEDICINE

## 2025-08-25 PROCEDURE — 99285 EMERGENCY DEPT VISIT HI MDM: CPT

## 2025-08-25 PROCEDURE — 25000003 PHARM REV CODE 250: Performed by: EMERGENCY MEDICINE

## 2025-08-25 RX ORDER — ACETAMINOPHEN 325 MG/1
650 TABLET ORAL EVERY 8 HOURS PRN
Status: DISCONTINUED | OUTPATIENT
Start: 2025-08-25 | End: 2025-08-27 | Stop reason: HOSPADM

## 2025-08-25 RX ORDER — MORPHINE SULFATE 4 MG/ML
4 INJECTION, SOLUTION INTRAMUSCULAR; INTRAVENOUS
Status: COMPLETED | OUTPATIENT
Start: 2025-08-25 | End: 2025-08-26

## 2025-08-25 RX ORDER — MEROPENEM 1 G/1
1 INJECTION, POWDER, FOR SOLUTION INTRAVENOUS
Status: COMPLETED | OUTPATIENT
Start: 2025-08-25 | End: 2025-08-25

## 2025-08-25 RX ORDER — ONDANSETRON HYDROCHLORIDE 2 MG/ML
4 INJECTION, SOLUTION INTRAVENOUS EVERY 8 HOURS PRN
Status: DISCONTINUED | OUTPATIENT
Start: 2025-08-25 | End: 2025-08-27 | Stop reason: HOSPADM

## 2025-08-25 RX ORDER — SODIUM CHLORIDE, SODIUM LACTATE, POTASSIUM CHLORIDE, CALCIUM CHLORIDE 600; 310; 30; 20 MG/100ML; MG/100ML; MG/100ML; MG/100ML
INJECTION, SOLUTION INTRAVENOUS CONTINUOUS
Status: DISCONTINUED | OUTPATIENT
Start: 2025-08-25 | End: 2025-08-27 | Stop reason: HOSPADM

## 2025-08-25 RX ORDER — ONDANSETRON 4 MG/1
8 TABLET, ORALLY DISINTEGRATING ORAL EVERY 8 HOURS PRN
Status: DISCONTINUED | OUTPATIENT
Start: 2025-08-25 | End: 2025-08-27 | Stop reason: HOSPADM

## 2025-08-25 RX ORDER — SODIUM CHLORIDE 0.9 % (FLUSH) 0.9 %
10 SYRINGE (ML) INJECTION
Status: DISCONTINUED | OUTPATIENT
Start: 2025-08-25 | End: 2025-08-27 | Stop reason: HOSPADM

## 2025-08-25 RX ORDER — TALC
6 POWDER (GRAM) TOPICAL NIGHTLY PRN
Status: DISCONTINUED | OUTPATIENT
Start: 2025-08-25 | End: 2025-08-27 | Stop reason: HOSPADM

## 2025-08-25 RX ORDER — AMOXICILLIN 250 MG
1 CAPSULE ORAL 2 TIMES DAILY
Status: DISCONTINUED | OUTPATIENT
Start: 2025-08-25 | End: 2025-08-27 | Stop reason: HOSPADM

## 2025-08-25 RX ORDER — PRENATAL WITH FERROUS FUM AND FOLIC ACID 3080; 920; 120; 400; 22; 1.84; 3; 20; 10; 1; 12; 200; 27; 25; 2 [IU]/1; [IU]/1; MG/1; [IU]/1; MG/1; MG/1; MG/1; MG/1; MG/1; MG/1; UG/1; MG/1; MG/1; MG/1; MG/1
1 TABLET ORAL DAILY
Status: DISCONTINUED | OUTPATIENT
Start: 2025-08-26 | End: 2025-08-27 | Stop reason: HOSPADM

## 2025-08-25 RX ADMIN — MORPHINE SULFATE 4 MG: 4 INJECTION, SOLUTION INTRAMUSCULAR; INTRAVENOUS at 10:08

## 2025-08-25 RX ADMIN — SODIUM CHLORIDE 1000 ML: 9 INJECTION, SOLUTION INTRAVENOUS at 09:08

## 2025-08-25 RX ADMIN — MEROPENEM 1 G: 1 INJECTION, POWDER, FOR SOLUTION INTRAVENOUS at 10:08

## 2025-08-26 PROBLEM — O23.02 PYELONEPHRITIS AFFECTING PREGNANCY IN SECOND TRIMESTER: Status: ACTIVE | Noted: 2025-08-25

## 2025-08-26 PROBLEM — N20.1 URETEROLITHIASIS: Status: ACTIVE | Noted: 2025-08-26

## 2025-08-26 PROBLEM — Z3A.24 24 WEEKS GESTATION OF PREGNANCY: Status: ACTIVE | Noted: 2025-08-26

## 2025-08-26 LAB
ALBUMIN SERPL-MCNC: 2.3 G/DL (ref 3.5–5)
ALBUMIN/GLOB SERPL: 0.8 RATIO (ref 1.1–2)
ALP SERPL-CCNC: 152 UNIT/L (ref 40–150)
ALT SERPL-CCNC: <5 UNIT/L (ref 0–55)
ANION GAP SERPL CALC-SCNC: 7 MEQ/L
AST SERPL-CCNC: 12 UNIT/L (ref 11–45)
BILIRUB SERPL-MCNC: 0.3 MG/DL
BUN SERPL-MCNC: 6 MG/DL (ref 7–18.7)
CALCIUM SERPL-MCNC: 8 MG/DL (ref 8.4–10.2)
CHLORIDE SERPL-SCNC: 109 MMOL/L (ref 98–107)
CO2 SERPL-SCNC: 20 MMOL/L (ref 22–29)
CREAT SERPL-MCNC: 0.51 MG/DL (ref 0.55–1.02)
CREAT/UREA NIT SERPL: 12
ERYTHROCYTE [DISTWIDTH] IN BLOOD BY AUTOMATED COUNT: 13.2 % (ref 11–14.5)
GFR SERPLBLD CREATININE-BSD FMLA CKD-EPI: >90 ML/MIN/1.73/M2
GLOBULIN SER-MCNC: 3 GM/DL (ref 2.4–3.5)
GLUCOSE SERPL-MCNC: 85 MG/DL (ref 74–100)
HCT VFR BLD AUTO: 28.3 % (ref 36–48)
HGB BLD-MCNC: 9.3 G/DL (ref 11.8–16)
LACTATE SERPL-SCNC: 0.5 MMOL/L (ref 0.5–2.2)
MCH RBC QN AUTO: 29.6 PG (ref 27–34)
MCHC RBC AUTO-ENTMCNC: 32.9 G/DL (ref 31–37)
MCV RBC AUTO: 90.1 FL (ref 79–99)
NRBC BLD AUTO-RTO: 0 %
PLATELET # BLD AUTO: 237 X10(3)/MCL (ref 140–371)
PMV BLD AUTO: 9.9 FL (ref 9.4–12.4)
POTASSIUM SERPL-SCNC: 3.1 MMOL/L (ref 3.5–5.1)
PROT SERPL-MCNC: 5.3 GM/DL (ref 6.4–8.3)
RBC # BLD AUTO: 3.14 X10(6)/MCL (ref 4–5.1)
SODIUM SERPL-SCNC: 136 MMOL/L (ref 136–145)
WBC # BLD AUTO: 9.88 X10(3)/MCL (ref 4–11.5)

## 2025-08-26 PROCEDURE — 96375 TX/PRO/DX INJ NEW DRUG ADDON: CPT

## 2025-08-26 PROCEDURE — 99223 1ST HOSP IP/OBS HIGH 75: CPT | Mod: ,,, | Performed by: OBSTETRICS & GYNECOLOGY

## 2025-08-26 PROCEDURE — 85027 COMPLETE CBC AUTOMATED: CPT | Performed by: OBSTETRICS & GYNECOLOGY

## 2025-08-26 PROCEDURE — G0378 HOSPITAL OBSERVATION PER HR: HCPCS

## 2025-08-26 PROCEDURE — 63600175 PHARM REV CODE 636 W HCPCS: Performed by: OBSTETRICS & GYNECOLOGY

## 2025-08-26 PROCEDURE — 36415 COLL VENOUS BLD VENIPUNCTURE: CPT | Performed by: OBSTETRICS & GYNECOLOGY

## 2025-08-26 PROCEDURE — 96361 HYDRATE IV INFUSION ADD-ON: CPT

## 2025-08-26 PROCEDURE — 96376 TX/PRO/DX INJ SAME DRUG ADON: CPT

## 2025-08-26 PROCEDURE — 59025 FETAL NON-STRESS TEST: CPT | Performed by: OBSTETRICS & GYNECOLOGY

## 2025-08-26 PROCEDURE — 25000003 PHARM REV CODE 250: Performed by: EMERGENCY MEDICINE

## 2025-08-26 PROCEDURE — 87040 BLOOD CULTURE FOR BACTERIA: CPT | Performed by: OBSTETRICS & GYNECOLOGY

## 2025-08-26 PROCEDURE — 63600175 PHARM REV CODE 636 W HCPCS: Performed by: EMERGENCY MEDICINE

## 2025-08-26 PROCEDURE — 83605 ASSAY OF LACTIC ACID: CPT | Performed by: OBSTETRICS & GYNECOLOGY

## 2025-08-26 PROCEDURE — 25000003 PHARM REV CODE 250: Performed by: OBSTETRICS & GYNECOLOGY

## 2025-08-26 PROCEDURE — 80053 COMPREHEN METABOLIC PANEL: CPT | Performed by: OBSTETRICS & GYNECOLOGY

## 2025-08-26 RX ORDER — PROCHLORPERAZINE MALEATE 5 MG
10 TABLET ORAL 3 TIMES DAILY PRN
Status: DISCONTINUED | OUTPATIENT
Start: 2025-08-26 | End: 2025-08-27 | Stop reason: HOSPADM

## 2025-08-26 RX ORDER — MEROPENEM 1 G/1
1 INJECTION, POWDER, FOR SOLUTION INTRAVENOUS
Status: DISCONTINUED | OUTPATIENT
Start: 2025-08-26 | End: 2025-08-27 | Stop reason: HOSPADM

## 2025-08-26 RX ORDER — HYDROCODONE BITARTRATE AND ACETAMINOPHEN 5; 325 MG/1; MG/1
1 TABLET ORAL EVERY 6 HOURS PRN
Refills: 0 | Status: DISCONTINUED | OUTPATIENT
Start: 2025-08-26 | End: 2025-08-27 | Stop reason: HOSPADM

## 2025-08-26 RX ADMIN — PRENATAL VITAMINS-IRON FUMARATE 27 MG IRON-FOLIC ACID 0.8 MG TABLET 1 TABLET: at 07:08

## 2025-08-26 RX ADMIN — ONDANSETRON 4 MG: 2 INJECTION INTRAMUSCULAR; INTRAVENOUS at 04:08

## 2025-08-26 RX ADMIN — SODIUM CHLORIDE, POTASSIUM CHLORIDE, SODIUM LACTATE AND CALCIUM CHLORIDE: 600; 310; 30; 20 INJECTION, SOLUTION INTRAVENOUS at 02:08

## 2025-08-26 RX ADMIN — MEROPENEM 1 G: 1 INJECTION, POWDER, FOR SOLUTION INTRAVENOUS at 04:08

## 2025-08-26 RX ADMIN — MORPHINE SULFATE 4 MG: 4 INJECTION, SOLUTION INTRAMUSCULAR; INTRAVENOUS at 07:08

## 2025-08-26 RX ADMIN — SODIUM CHLORIDE, POTASSIUM CHLORIDE, SODIUM LACTATE AND CALCIUM CHLORIDE: 600; 310; 30; 20 INJECTION, SOLUTION INTRAVENOUS at 09:08

## 2025-08-26 RX ADMIN — ACETAMINOPHEN 650 MG: 325 TABLET ORAL at 08:08

## 2025-08-26 RX ADMIN — SODIUM CHLORIDE, POTASSIUM CHLORIDE, SODIUM LACTATE AND CALCIUM CHLORIDE: 600; 310; 30; 20 INJECTION, SOLUTION INTRAVENOUS at 07:08

## 2025-08-26 RX ADMIN — MORPHINE SULFATE 4 MG: 4 INJECTION, SOLUTION INTRAMUSCULAR; INTRAVENOUS at 05:08

## 2025-08-26 RX ADMIN — MEROPENEM 1 G: 1 INJECTION, POWDER, FOR SOLUTION INTRAVENOUS at 07:08

## 2025-08-26 RX ADMIN — PROCHLORPERAZINE MALEATE 10 MG: 5 TABLET ORAL at 06:08

## 2025-08-26 RX ADMIN — HYDROCODONE BITARTRATE AND ACETAMINOPHEN 1 TABLET: 5; 325 TABLET ORAL at 11:08

## 2025-08-27 VITALS
TEMPERATURE: 98 F | BODY MASS INDEX: 23.9 KG/M2 | OXYGEN SATURATION: 98 % | DIASTOLIC BLOOD PRESSURE: 68 MMHG | HEIGHT: 64 IN | WEIGHT: 140 LBS | HEART RATE: 76 BPM | RESPIRATION RATE: 18 BRPM | SYSTOLIC BLOOD PRESSURE: 113 MMHG

## 2025-08-27 PROBLEM — A49.9 ESBL (EXTENDED SPECTRUM BETA-LACTAMASE) PRODUCING BACTERIA INFECTION: Status: ACTIVE | Noted: 2025-08-27

## 2025-08-27 PROBLEM — Z16.12 ESBL (EXTENDED SPECTRUM BETA-LACTAMASE) PRODUCING BACTERIA INFECTION: Status: ACTIVE | Noted: 2025-08-27

## 2025-08-27 LAB
ALBUMIN SERPL-MCNC: 2.2 G/DL (ref 3.5–5)
ALBUMIN/GLOB SERPL: 0.8 RATIO (ref 1.1–2)
ALP SERPL-CCNC: 213 UNIT/L (ref 40–150)
ALT SERPL-CCNC: <5 UNIT/L (ref 0–55)
ANION GAP SERPL CALC-SCNC: 8 MEQ/L
AST SERPL-CCNC: 13 UNIT/L (ref 11–45)
BASOPHILS # BLD AUTO: 0.03 X10(3)/MCL (ref 0.01–0.08)
BASOPHILS NFR BLD AUTO: 0.4 % (ref 0.1–1.2)
BILIRUB SERPL-MCNC: 0.3 MG/DL
BUN SERPL-MCNC: 5 MG/DL (ref 7–18.7)
CALCIUM SERPL-MCNC: 8 MG/DL (ref 8.4–10.2)
CHLORIDE SERPL-SCNC: 106 MMOL/L (ref 98–107)
CO2 SERPL-SCNC: 20 MMOL/L (ref 22–29)
CREAT SERPL-MCNC: 0.44 MG/DL (ref 0.55–1.02)
CREAT/UREA NIT SERPL: 11
EOSINOPHIL # BLD AUTO: 0.11 X10(3)/MCL (ref 0.04–0.36)
EOSINOPHIL NFR BLD AUTO: 1.6 % (ref 0.7–7)
ERYTHROCYTE [DISTWIDTH] IN BLOOD BY AUTOMATED COUNT: 13.2 % (ref 11–14.5)
GFR SERPLBLD CREATININE-BSD FMLA CKD-EPI: >90 ML/MIN/1.73/M2
GLOBULIN SER-MCNC: 2.9 GM/DL (ref 2.4–3.5)
GLUCOSE SERPL-MCNC: 76 MG/DL (ref 74–100)
HCT VFR BLD AUTO: 28.5 % (ref 36–48)
HGB BLD-MCNC: 8.8 G/DL (ref 11.8–16)
IMM GRANULOCYTES # BLD AUTO: 0.07 X10(3)/MCL (ref 0–0.03)
IMM GRANULOCYTES NFR BLD AUTO: 1 % (ref 0–0.5)
LYMPHOCYTES # BLD AUTO: 1.2 X10(3)/MCL (ref 1.16–3.74)
LYMPHOCYTES NFR BLD AUTO: 17.1 % (ref 20–55)
MCH RBC QN AUTO: 29.1 PG (ref 27–34)
MCHC RBC AUTO-ENTMCNC: 30.9 G/DL (ref 31–37)
MCV RBC AUTO: 94.4 FL (ref 79–99)
MONOCYTES # BLD AUTO: 0.75 X10(3)/MCL (ref 0.24–0.36)
MONOCYTES NFR BLD AUTO: 10.7 % (ref 4.7–12.5)
NEUTROPHILS # BLD AUTO: 4.84 X10(3)/MCL (ref 1.56–6.13)
NEUTROPHILS NFR BLD AUTO: 69.2 % (ref 37–73)
NRBC BLD AUTO-RTO: 0 %
PLATELET # BLD AUTO: 225 X10(3)/MCL (ref 140–371)
PMV BLD AUTO: 9.9 FL (ref 9.4–12.4)
POTASSIUM SERPL-SCNC: 3.2 MMOL/L (ref 3.5–5.1)
PROT SERPL-MCNC: 5.1 GM/DL (ref 6.4–8.3)
RBC # BLD AUTO: 3.02 X10(6)/MCL (ref 4–5.1)
SODIUM SERPL-SCNC: 134 MMOL/L (ref 136–145)
WBC # BLD AUTO: 7 X10(3)/MCL (ref 4–11.5)

## 2025-08-27 PROCEDURE — 80053 COMPREHEN METABOLIC PANEL: CPT | Performed by: OBSTETRICS & GYNECOLOGY

## 2025-08-27 PROCEDURE — 36415 COLL VENOUS BLD VENIPUNCTURE: CPT | Performed by: OBSTETRICS & GYNECOLOGY

## 2025-08-27 PROCEDURE — 25000003 PHARM REV CODE 250: Performed by: EMERGENCY MEDICINE

## 2025-08-27 PROCEDURE — 25000003 PHARM REV CODE 250: Performed by: OBSTETRICS & GYNECOLOGY

## 2025-08-27 PROCEDURE — 99238 HOSP IP/OBS DSCHRG MGMT 30/<: CPT | Mod: 25,,, | Performed by: OBSTETRICS & GYNECOLOGY

## 2025-08-27 PROCEDURE — 59025 FETAL NON-STRESS TEST: CPT | Mod: 26,,, | Performed by: OBSTETRICS & GYNECOLOGY

## 2025-08-27 PROCEDURE — 96361 HYDRATE IV INFUSION ADD-ON: CPT

## 2025-08-27 PROCEDURE — 63600175 PHARM REV CODE 636 W HCPCS: Performed by: OBSTETRICS & GYNECOLOGY

## 2025-08-27 PROCEDURE — 85025 COMPLETE CBC W/AUTO DIFF WBC: CPT | Performed by: OBSTETRICS & GYNECOLOGY

## 2025-08-27 PROCEDURE — 96376 TX/PRO/DX INJ SAME DRUG ADON: CPT

## 2025-08-27 PROCEDURE — G0378 HOSPITAL OBSERVATION PER HR: HCPCS

## 2025-08-27 RX ORDER — POTASSIUM CHLORIDE 20 MEQ/1
20 TABLET, EXTENDED RELEASE ORAL 2 TIMES DAILY
Qty: 10 TABLET | Refills: 0 | Status: SHIPPED | OUTPATIENT
Start: 2025-08-27 | End: 2025-09-02

## 2025-08-27 RX ORDER — ELECTROLYTES/DEXTROSE
300 SOLUTION, ORAL ORAL
Status: DISCONTINUED | OUTPATIENT
Start: 2025-08-27 | End: 2025-08-27 | Stop reason: HOSPADM

## 2025-08-27 RX ORDER — FERROUS SULFATE 325(65) MG
325 TABLET, DELAYED RELEASE (ENTERIC COATED) ORAL DAILY
Qty: 30 TABLET | Refills: 2 | Status: SHIPPED | OUTPATIENT
Start: 2025-08-27

## 2025-08-27 RX ORDER — NITROFURANTOIN 25; 75 MG/1; MG/1
100 CAPSULE ORAL 2 TIMES DAILY
Qty: 14 CAPSULE | Refills: 0 | Status: SHIPPED | OUTPATIENT
Start: 2025-08-27 | End: 2025-09-03

## 2025-08-27 RX ADMIN — MEROPENEM 1 G: 1 INJECTION, POWDER, FOR SOLUTION INTRAVENOUS at 12:08

## 2025-08-27 RX ADMIN — SENNOSIDES AND DOCUSATE SODIUM 1 TABLET: 8.6; 5 TABLET ORAL at 08:08

## 2025-08-27 RX ADMIN — ACETAMINOPHEN 650 MG: 325 TABLET ORAL at 12:08

## 2025-08-27 RX ADMIN — Medication 300 ML: at 12:08

## 2025-08-27 RX ADMIN — PROCHLORPERAZINE MALEATE 10 MG: 5 TABLET ORAL at 12:08

## 2025-08-27 RX ADMIN — MEROPENEM 1 G: 1 INJECTION, POWDER, FOR SOLUTION INTRAVENOUS at 08:08

## 2025-08-27 RX ADMIN — SODIUM CHLORIDE, POTASSIUM CHLORIDE, SODIUM LACTATE AND CALCIUM CHLORIDE: 600; 310; 30; 20 INJECTION, SOLUTION INTRAVENOUS at 05:08

## 2025-08-27 RX ADMIN — PRENATAL VITAMINS-IRON FUMARATE 27 MG IRON-FOLIC ACID 0.8 MG TABLET 1 TABLET: at 08:08

## 2025-08-27 RX ADMIN — Medication 300 ML: at 08:08

## 2025-08-27 RX ADMIN — ONDANSETRON 8 MG: 4 TABLET, ORALLY DISINTEGRATING ORAL at 08:08

## 2025-08-27 RX ADMIN — Medication 300 ML: at 03:08

## 2025-08-27 RX ADMIN — ACETAMINOPHEN 650 MG: 325 TABLET ORAL at 08:08

## 2025-08-31 LAB — BACTERIA BLD CULT: NORMAL

## 2025-09-02 ENCOUNTER — ROUTINE PRENATAL (OUTPATIENT)
Dept: OBSTETRICS AND GYNECOLOGY | Facility: CLINIC | Age: 30
End: 2025-09-02
Payer: MEDICAID

## 2025-09-02 VITALS — WEIGHT: 141 LBS | BODY MASS INDEX: 24.2 KG/M2 | SYSTOLIC BLOOD PRESSURE: 122 MMHG | DIASTOLIC BLOOD PRESSURE: 66 MMHG

## 2025-09-02 DIAGNOSIS — O34.219 PREVIOUS CESAREAN DELIVERY AFFECTING PREGNANCY: ICD-10-CM

## 2025-09-02 DIAGNOSIS — F12.90 MARIJUANA USE DURING PREGNANCY: ICD-10-CM

## 2025-09-02 DIAGNOSIS — O23.02 PYELONEPHRITIS AFFECTING PREGNANCY IN SECOND TRIMESTER: Primary | ICD-10-CM

## 2025-09-02 DIAGNOSIS — Z3A.25 25 WEEKS GESTATION OF PREGNANCY: ICD-10-CM

## 2025-09-02 DIAGNOSIS — O99.012 ANEMIA AFFECTING PREGNANCY IN SECOND TRIMESTER: ICD-10-CM

## 2025-09-02 DIAGNOSIS — E87.6 HYPOKALEMIA: ICD-10-CM

## 2025-09-02 DIAGNOSIS — A49.9 ESBL (EXTENDED SPECTRUM BETA-LACTAMASE) PRODUCING BACTERIA INFECTION: ICD-10-CM

## 2025-09-02 DIAGNOSIS — Z16.12 ESBL (EXTENDED SPECTRUM BETA-LACTAMASE) PRODUCING BACTERIA INFECTION: ICD-10-CM

## 2025-09-02 DIAGNOSIS — O34.219 UTERINE SCAR FROM PREVIOUS CESAREAN DELIVERY AFFECTING PREGNANCY: ICD-10-CM

## 2025-09-02 DIAGNOSIS — O99.320 MARIJUANA USE DURING PREGNANCY: ICD-10-CM

## 2025-09-02 PROBLEM — Z3A.24 24 WEEKS GESTATION OF PREGNANCY: Status: RESOLVED | Noted: 2025-08-26 | Resolved: 2025-09-02

## 2025-09-02 LAB
BACTERIA BLD CULT: NORMAL
BACTERIA UR CULT: ABNORMAL
BILIRUB UR QL STRIP: NEGATIVE
GLUCOSE UR QL STRIP: NEGATIVE
KETONES UR QL STRIP: POSITIVE
LEUKOCYTE ESTERASE UR QL STRIP: NEGATIVE
PH, POC UA: 7
POC BLOOD, URINE: NEGATIVE
POC NITRATES, URINE: NEGATIVE
PROT UR QL STRIP: POSITIVE
SP GR UR STRIP: 1.02 (ref 1–1.03)
UROBILINOGEN UR STRIP-ACNC: 0.2 (ref 0.1–1.1)

## 2025-09-02 PROCEDURE — 87086 URINE CULTURE/COLONY COUNT: CPT | Performed by: OBSTETRICS & GYNECOLOGY

## 2025-09-05 LAB — BACTERIA UR CULT: NO GROWTH

## (undated) DEVICE — SUT 0 27IN CHROMIC GUT CT-1

## (undated) DEVICE — GOWN ORBIS LVL 4 LG 43IN

## (undated) DEVICE — DRAPE HALF SURGICAL 40X58IN

## (undated) DEVICE — SPONGE GAUZE 4X4 12 PLY STRL

## (undated) DEVICE — SUT CHROMIC 2-0 SH 27IN BRN

## (undated) DEVICE — SOCKINETTE IMPERVIOUS 12X48IN

## (undated) DEVICE — GLOVE PROTEXIS PI SYN SURG 8.5

## (undated) DEVICE — SUT PDS II 96IN XLH TAPER

## (undated) DEVICE — DRAPE EXTREMITY ORTHOMAX

## (undated) DEVICE — SUT VICRYL 3-0 CTX 36IN

## (undated) DEVICE — GOWN POLY REINF BRTH SLV 2XL

## (undated) DEVICE — ADHESIVE DERMABOND ADVANCED

## (undated) DEVICE — PAD SURFACE PREP 17

## (undated) DEVICE — SPONGE LAP 18X18 PREWASHED

## (undated) DEVICE — SUT MONOCRYL CT 0 36IN

## (undated) DEVICE — BANDAGE ROLL COTTN 4.5INX4.1YD

## (undated) DEVICE — CHLORAPREP W TINT 26ML APPL

## (undated) DEVICE — DRAPE LAP T SHT W/ INSTR PAD

## (undated) DEVICE — DRAPE CESAREAN 98X123.5X77 STR

## (undated) DEVICE — BLADE SURG #15 CARBON STEEL

## (undated) DEVICE — NDL SAFETYGLIDE HYPO 21G 1IN

## (undated) DEVICE — UNDERPAD ULTRASORB 300LB 30X36

## (undated) DEVICE — PACK C-SECTION CUSTOM

## (undated) DEVICE — TOWEL OR DISP STRL BLUE 4/PK

## (undated) DEVICE — ELECTRODE REM PLYHSV RETURN 9

## (undated) DEVICE — SYR 10CC LUER LOCK

## (undated) DEVICE — GAUZE XEROFORM NONADH 5X9IN

## (undated) DEVICE — SYS EXOFIN SKIN CLOSURE 22CM

## (undated) DEVICE — TRAY CATH URETHRAL FOLEY 16FR

## (undated) DEVICE — SYS KIWI DELIVERY OMNICUP VAC

## (undated) DEVICE — Device

## (undated) DEVICE — BANDAGE MATRIX HK LOOP 4IN 5YD